# Patient Record
Sex: MALE | Race: WHITE | NOT HISPANIC OR LATINO | Employment: OTHER | URBAN - METROPOLITAN AREA
[De-identification: names, ages, dates, MRNs, and addresses within clinical notes are randomized per-mention and may not be internally consistent; named-entity substitution may affect disease eponyms.]

---

## 2018-08-10 ENCOUNTER — HOSPITAL ENCOUNTER (INPATIENT)
Facility: HOSPITAL | Age: 73
LOS: 3 days | Discharge: HOME | End: 2018-08-13
Attending: EMERGENCY MEDICINE | Admitting: HOSPITALIST

## 2018-08-10 DIAGNOSIS — K92.2 GI BLEED: ICD-10-CM

## 2018-08-10 DIAGNOSIS — K92.1 HEMATOCHEZIA: ICD-10-CM

## 2018-08-10 DIAGNOSIS — K92.2 ACUTE GASTROINTESTINAL HEMORRHAGE: Primary | ICD-10-CM

## 2018-08-10 LAB
ABO GROUP BLD: NORMAL
ABO GROUP BLD: NORMAL
ALBUMIN SERPL-MCNC: 4.2 G/DL (ref 3.5–5.2)
ALBUMIN/GLOB SERPL: 2.1 G/DL
ALP SERPL-CCNC: 45 U/L (ref 40–129)
ALT SERPL W P-5'-P-CCNC: 28 U/L (ref 5–41)
ANION GAP SERPL CALCULATED.3IONS-SCNC: 11.2 MMOL/L
APTT PPP: 30.7 SECONDS (ref 24.3–38.1)
AST SERPL-CCNC: 18 U/L (ref 5–40)
BASOPHILS # BLD AUTO: 0.05 10*3/MM3 (ref 0–0.2)
BASOPHILS NFR BLD AUTO: 0.5 % (ref 0–2)
BILIRUB SERPL-MCNC: 0.2 MG/DL (ref 0.2–1.2)
BLD GP AB SCN SERPL QL: NEGATIVE
BUN BLD-MCNC: 24 MG/DL (ref 8–23)
BUN/CREAT SERPL: 20.5 (ref 7–25)
CALCIUM SPEC-SCNC: 9.3 MG/DL (ref 8.8–10.5)
CHLORIDE SERPL-SCNC: 104 MMOL/L (ref 98–107)
CO2 SERPL-SCNC: 23.8 MMOL/L (ref 22–29)
CREAT BLD-MCNC: 1.17 MG/DL (ref 0.76–1.27)
DEPRECATED RDW RBC AUTO: 45 FL (ref 37–54)
EOSINOPHIL # BLD AUTO: 0.13 10*3/MM3 (ref 0.1–0.3)
EOSINOPHIL NFR BLD AUTO: 1.3 % (ref 0–4)
ERYTHROCYTE [DISTWIDTH] IN BLOOD BY AUTOMATED COUNT: 13.2 % (ref 11.5–14.5)
GFR SERPL CREATININE-BSD FRML MDRD: 61 ML/MIN/1.73
GLOBULIN UR ELPH-MCNC: 2 GM/DL
GLUCOSE BLD-MCNC: 139 MG/DL (ref 65–99)
HCT VFR BLD AUTO: 35.8 % (ref 42–52)
HCT VFR BLD AUTO: 40 % (ref 42–52)
HGB BLD-MCNC: 11.7 G/DL (ref 14–18)
HGB BLD-MCNC: 13.1 G/DL (ref 14–18)
IMM GRANULOCYTES # BLD: 0.03 10*3/MM3 (ref 0–0.03)
IMM GRANULOCYTES NFR BLD: 0.3 % (ref 0–0.5)
INR PPP: 1.05 (ref 0.9–1.1)
LIPASE SERPL-CCNC: 22 U/L (ref 13–60)
LYMPHOCYTES # BLD AUTO: 3.74 10*3/MM3 (ref 0.6–4.8)
LYMPHOCYTES NFR BLD AUTO: 38.7 % (ref 20–45)
MCH RBC QN AUTO: 30.6 PG (ref 27–31)
MCHC RBC AUTO-ENTMCNC: 32.8 G/DL (ref 31–37)
MCV RBC AUTO: 93.5 FL (ref 80–94)
MONOCYTES # BLD AUTO: 0.8 10*3/MM3 (ref 0–1)
MONOCYTES NFR BLD AUTO: 8.3 % (ref 3–8)
NEUTROPHILS # BLD AUTO: 4.92 10*3/MM3 (ref 1.5–8.3)
NEUTROPHILS NFR BLD AUTO: 50.9 % (ref 45–70)
NRBC BLD MANUAL-RTO: 0 /100 WBC (ref 0–0)
PLATELET # BLD AUTO: 233 10*3/MM3 (ref 140–500)
PMV BLD AUTO: 10.9 FL (ref 7.4–10.4)
POTASSIUM BLD-SCNC: 4.7 MMOL/L (ref 3.5–5.2)
PROT SERPL-MCNC: 6.2 G/DL (ref 6–8.5)
PROTHROMBIN TIME: 13.7 SECONDS (ref 12.1–15)
RBC # BLD AUTO: 4.28 10*6/MM3 (ref 4.7–6.1)
RH BLD: POSITIVE
RH BLD: POSITIVE
SODIUM BLD-SCNC: 139 MMOL/L (ref 136–145)
T&S EXPIRATION DATE: NORMAL
WBC NRBC COR # BLD: 9.67 10*3/MM3 (ref 4.8–10.8)

## 2018-08-10 PROCEDURE — 85730 THROMBOPLASTIN TIME PARTIAL: CPT | Performed by: EMERGENCY MEDICINE

## 2018-08-10 PROCEDURE — 99284 EMERGENCY DEPT VISIT MOD MDM: CPT

## 2018-08-10 PROCEDURE — 86901 BLOOD TYPING SEROLOGIC RH(D): CPT | Performed by: EMERGENCY MEDICINE

## 2018-08-10 PROCEDURE — 85610 PROTHROMBIN TIME: CPT | Performed by: EMERGENCY MEDICINE

## 2018-08-10 PROCEDURE — 85014 HEMATOCRIT: CPT | Performed by: INTERNAL MEDICINE

## 2018-08-10 PROCEDURE — 80053 COMPREHEN METABOLIC PANEL: CPT | Performed by: EMERGENCY MEDICINE

## 2018-08-10 PROCEDURE — 83690 ASSAY OF LIPASE: CPT | Performed by: EMERGENCY MEDICINE

## 2018-08-10 PROCEDURE — 86923 COMPATIBILITY TEST ELECTRIC: CPT

## 2018-08-10 PROCEDURE — 85018 HEMOGLOBIN: CPT | Performed by: INTERNAL MEDICINE

## 2018-08-10 PROCEDURE — 86901 BLOOD TYPING SEROLOGIC RH(D): CPT

## 2018-08-10 PROCEDURE — 99223 1ST HOSP IP/OBS HIGH 75: CPT | Performed by: INTERNAL MEDICINE

## 2018-08-10 PROCEDURE — 86850 RBC ANTIBODY SCREEN: CPT | Performed by: EMERGENCY MEDICINE

## 2018-08-10 PROCEDURE — 85025 COMPLETE CBC W/AUTO DIFF WBC: CPT | Performed by: EMERGENCY MEDICINE

## 2018-08-10 PROCEDURE — 99284 EMERGENCY DEPT VISIT MOD MDM: CPT | Performed by: EMERGENCY MEDICINE

## 2018-08-10 PROCEDURE — 86900 BLOOD TYPING SEROLOGIC ABO: CPT

## 2018-08-10 PROCEDURE — 86900 BLOOD TYPING SEROLOGIC ABO: CPT | Performed by: EMERGENCY MEDICINE

## 2018-08-10 RX ORDER — FINASTERIDE 5 MG/1
5 TABLET, FILM COATED ORAL DAILY
COMMUNITY
End: 2020-12-29 | Stop reason: SDUPTHER

## 2018-08-10 RX ORDER — ATORVASTATIN CALCIUM 40 MG/1
40 TABLET, FILM COATED ORAL DAILY
Status: DISCONTINUED | OUTPATIENT
Start: 2018-08-11 | End: 2018-08-13 | Stop reason: HOSPADM

## 2018-08-10 RX ORDER — TRAMADOL HYDROCHLORIDE 50 MG/1
50 TABLET ORAL EVERY 6 HOURS PRN
Status: DISCONTINUED | OUTPATIENT
Start: 2018-08-10 | End: 2018-08-13 | Stop reason: HOSPADM

## 2018-08-10 RX ORDER — ASPIRIN 81 MG/1
81 TABLET, CHEWABLE ORAL DAILY
COMMUNITY
End: 2018-08-13 | Stop reason: HOSPADM

## 2018-08-10 RX ORDER — SODIUM CHLORIDE 9 MG/ML
75 INJECTION, SOLUTION INTRAVENOUS CONTINUOUS
Status: DISCONTINUED | OUTPATIENT
Start: 2018-08-10 | End: 2018-08-11

## 2018-08-10 RX ORDER — CARVEDILOL 12.5 MG/1
12.5 TABLET ORAL 2 TIMES DAILY WITH MEALS
COMMUNITY
End: 2020-12-29 | Stop reason: SDUPTHER

## 2018-08-10 RX ORDER — SODIUM CHLORIDE 0.9 % (FLUSH) 0.9 %
1-10 SYRINGE (ML) INJECTION AS NEEDED
Status: DISCONTINUED | OUTPATIENT
Start: 2018-08-10 | End: 2018-08-13 | Stop reason: HOSPADM

## 2018-08-10 RX ORDER — ALLOPURINOL 300 MG/1
300 TABLET ORAL DAILY
Status: DISCONTINUED | OUTPATIENT
Start: 2018-08-11 | End: 2018-08-13 | Stop reason: HOSPADM

## 2018-08-10 RX ORDER — LISINOPRIL 10 MG/1
10 TABLET ORAL DAILY
Status: DISCONTINUED | OUTPATIENT
Start: 2018-08-11 | End: 2018-08-13 | Stop reason: HOSPADM

## 2018-08-10 RX ORDER — CALCIUM CARBONATE 200(500)MG
1 TABLET,CHEWABLE ORAL 2 TIMES DAILY PRN
Status: DISCONTINUED | OUTPATIENT
Start: 2018-08-10 | End: 2018-08-10

## 2018-08-10 RX ORDER — CELECOXIB 200 MG/1
200 CAPSULE ORAL DAILY PRN
COMMUNITY
End: 2020-12-29 | Stop reason: SDUPTHER

## 2018-08-10 RX ORDER — FINASTERIDE 5 MG/1
5 TABLET, FILM COATED ORAL DAILY
Status: DISCONTINUED | OUTPATIENT
Start: 2018-08-11 | End: 2018-08-13 | Stop reason: HOSPADM

## 2018-08-10 RX ORDER — CHOLECALCIFEROL (VITAMIN D3) 125 MCG
5 CAPSULE ORAL NIGHTLY PRN
Status: DISCONTINUED | OUTPATIENT
Start: 2018-08-10 | End: 2018-08-13 | Stop reason: HOSPADM

## 2018-08-10 RX ORDER — PANTOPRAZOLE SODIUM 40 MG/10ML
80 INJECTION, POWDER, LYOPHILIZED, FOR SOLUTION INTRAVENOUS EVERY 12 HOURS SCHEDULED
Status: DISCONTINUED | OUTPATIENT
Start: 2018-08-10 | End: 2018-08-10

## 2018-08-10 RX ORDER — ONDANSETRON 4 MG/1
4 TABLET, FILM COATED ORAL EVERY 6 HOURS PRN
Status: DISCONTINUED | OUTPATIENT
Start: 2018-08-10 | End: 2018-08-13 | Stop reason: HOSPADM

## 2018-08-10 RX ORDER — ATORVASTATIN CALCIUM 40 MG/1
40 TABLET, FILM COATED ORAL DAILY
COMMUNITY
End: 2020-12-29 | Stop reason: SDUPTHER

## 2018-08-10 RX ORDER — ALLOPURINOL 300 MG/1
300 TABLET ORAL DAILY
COMMUNITY
End: 2020-12-29 | Stop reason: SDUPTHER

## 2018-08-10 RX ORDER — LISINOPRIL 20 MG/1
10 TABLET ORAL DAILY
COMMUNITY
End: 2020-12-29 | Stop reason: SDUPTHER

## 2018-08-10 RX ORDER — SODIUM CHLORIDE 0.9 % (FLUSH) 0.9 %
10 SYRINGE (ML) INJECTION AS NEEDED
Status: DISCONTINUED | OUTPATIENT
Start: 2018-08-10 | End: 2018-08-10

## 2018-08-10 RX ORDER — ACETAMINOPHEN 325 MG/1
650 TABLET ORAL EVERY 4 HOURS PRN
Status: DISCONTINUED | OUTPATIENT
Start: 2018-08-10 | End: 2018-08-13 | Stop reason: HOSPADM

## 2018-08-10 RX ORDER — PANTOPRAZOLE SODIUM 40 MG/10ML
40 INJECTION, POWDER, LYOPHILIZED, FOR SOLUTION INTRAVENOUS
Status: DISCONTINUED | OUTPATIENT
Start: 2018-08-11 | End: 2018-08-10

## 2018-08-10 RX ORDER — CARVEDILOL 12.5 MG/1
12.5 TABLET ORAL 2 TIMES DAILY WITH MEALS
Status: DISCONTINUED | OUTPATIENT
Start: 2018-08-10 | End: 2018-08-13 | Stop reason: HOSPADM

## 2018-08-10 RX ORDER — SODIUM CHLORIDE 9 MG/ML
40 INJECTION, SOLUTION INTRAVENOUS AS NEEDED
Status: DISCONTINUED | OUTPATIENT
Start: 2018-08-10 | End: 2018-08-13 | Stop reason: HOSPADM

## 2018-08-10 RX ORDER — PANTOPRAZOLE SODIUM 40 MG/10ML
80 INJECTION, POWDER, LYOPHILIZED, FOR SOLUTION INTRAVENOUS ONCE
Status: COMPLETED | OUTPATIENT
Start: 2018-08-10 | End: 2018-08-10

## 2018-08-10 RX ADMIN — Medication 10 ML: at 21:16

## 2018-08-10 RX ADMIN — SODIUM CHLORIDE 75 ML/HR: 9 INJECTION, SOLUTION INTRAVENOUS at 21:16

## 2018-08-10 RX ADMIN — FAMOTIDINE 40 MG: 10 INJECTION, SOLUTION INTRAVENOUS at 15:57

## 2018-08-10 RX ADMIN — SODIUM CHLORIDE 1000 ML: 9 INJECTION, SOLUTION INTRAVENOUS at 15:57

## 2018-08-10 RX ADMIN — PANTOPRAZOLE SODIUM 8 MG/HR: 40 INJECTION, POWDER, FOR SOLUTION INTRAVENOUS at 21:17

## 2018-08-10 RX ADMIN — PANTOPRAZOLE SODIUM 80 MG: 40 INJECTION, POWDER, FOR SOLUTION INTRAVENOUS at 21:15

## 2018-08-11 ENCOUNTER — ANESTHESIA EVENT (OUTPATIENT)
Dept: PERIOP | Facility: HOSPITAL | Age: 73
End: 2018-08-11

## 2018-08-11 ENCOUNTER — ANESTHESIA (OUTPATIENT)
Dept: PERIOP | Facility: HOSPITAL | Age: 73
End: 2018-08-11

## 2018-08-11 LAB
ANION GAP SERPL CALCULATED.3IONS-SCNC: 10.1 MMOL/L
BASOPHILS # BLD AUTO: 0.03 10*3/MM3 (ref 0–0.2)
BASOPHILS NFR BLD AUTO: 0.3 % (ref 0–2)
BUN BLD-MCNC: 18 MG/DL (ref 8–23)
BUN/CREAT SERPL: 19.1 (ref 7–25)
CALCIUM SPEC-SCNC: 8.4 MG/DL (ref 8.8–10.5)
CHLORIDE SERPL-SCNC: 105 MMOL/L (ref 98–107)
CO2 SERPL-SCNC: 24.9 MMOL/L (ref 22–29)
CREAT BLD-MCNC: 0.94 MG/DL (ref 0.76–1.27)
DEPRECATED RDW RBC AUTO: 45.8 FL (ref 37–54)
EOSINOPHIL # BLD AUTO: 0.03 10*3/MM3 (ref 0.1–0.3)
EOSINOPHIL NFR BLD AUTO: 0.3 % (ref 0–4)
ERYTHROCYTE [DISTWIDTH] IN BLOOD BY AUTOMATED COUNT: 13.3 % (ref 11.5–14.5)
GFR SERPL CREATININE-BSD FRML MDRD: 79 ML/MIN/1.73
GLUCOSE BLD-MCNC: 172 MG/DL (ref 65–99)
GLUCOSE BLDC GLUCOMTR-MCNC: 187 MG/DL (ref 70–130)
HCT VFR BLD AUTO: 28.8 % (ref 42–52)
HCT VFR BLD AUTO: 32.2 % (ref 42–52)
HGB BLD-MCNC: 10.6 G/DL (ref 14–18)
HGB BLD-MCNC: 9.5 G/DL (ref 14–18)
IMM GRANULOCYTES # BLD: 0.03 10*3/MM3 (ref 0–0.03)
IMM GRANULOCYTES NFR BLD: 0.3 % (ref 0–0.5)
LYMPHOCYTES # BLD AUTO: 3.13 10*3/MM3 (ref 0.6–4.8)
LYMPHOCYTES NFR BLD AUTO: 28.6 % (ref 20–45)
MCH RBC QN AUTO: 31.1 PG (ref 27–31)
MCHC RBC AUTO-ENTMCNC: 32.9 G/DL (ref 31–37)
MCV RBC AUTO: 94.4 FL (ref 80–94)
MONOCYTES # BLD AUTO: 0.55 10*3/MM3 (ref 0–1)
MONOCYTES NFR BLD AUTO: 5 % (ref 3–8)
NEUTROPHILS # BLD AUTO: 7.17 10*3/MM3 (ref 1.5–8.3)
NEUTROPHILS NFR BLD AUTO: 65.5 % (ref 45–70)
NRBC BLD MANUAL-RTO: 0 /100 WBC (ref 0–0)
PLATELET # BLD AUTO: 216 10*3/MM3 (ref 140–500)
PMV BLD AUTO: 10.5 FL (ref 7.4–10.4)
POTASSIUM BLD-SCNC: 4.3 MMOL/L (ref 3.5–5.2)
RBC # BLD AUTO: 3.41 10*6/MM3 (ref 4.7–6.1)
SODIUM BLD-SCNC: 140 MMOL/L (ref 136–145)
WBC NRBC COR # BLD: 10.94 10*3/MM3 (ref 4.8–10.8)

## 2018-08-11 PROCEDURE — 25010000002 PROPOFOL 10 MG/ML EMULSION: Performed by: NURSE ANESTHETIST, CERTIFIED REGISTERED

## 2018-08-11 PROCEDURE — 93005 ELECTROCARDIOGRAM TRACING: CPT | Performed by: NURSE ANESTHETIST, CERTIFIED REGISTERED

## 2018-08-11 PROCEDURE — 82962 GLUCOSE BLOOD TEST: CPT

## 2018-08-11 PROCEDURE — 99232 SBSQ HOSP IP/OBS MODERATE 35: CPT | Performed by: FAMILY MEDICINE

## 2018-08-11 PROCEDURE — 0DB38ZX EXCISION OF LOWER ESOPHAGUS, VIA NATURAL OR ARTIFICIAL OPENING ENDOSCOPIC, DIAGNOSTIC: ICD-10-PCS | Performed by: INTERNAL MEDICINE

## 2018-08-11 PROCEDURE — 43239 EGD BIOPSY SINGLE/MULTIPLE: CPT | Performed by: INTERNAL MEDICINE

## 2018-08-11 PROCEDURE — 85025 COMPLETE CBC W/AUTO DIFF WBC: CPT | Performed by: INTERNAL MEDICINE

## 2018-08-11 PROCEDURE — 85014 HEMATOCRIT: CPT | Performed by: FAMILY MEDICINE

## 2018-08-11 PROCEDURE — 80048 BASIC METABOLIC PNL TOTAL CA: CPT | Performed by: INTERNAL MEDICINE

## 2018-08-11 PROCEDURE — 93010 ELECTROCARDIOGRAM REPORT: CPT | Performed by: INTERNAL MEDICINE

## 2018-08-11 PROCEDURE — 85018 HEMOGLOBIN: CPT | Performed by: FAMILY MEDICINE

## 2018-08-11 PROCEDURE — 0DB68ZX EXCISION OF STOMACH, VIA NATURAL OR ARTIFICIAL OPENING ENDOSCOPIC, DIAGNOSTIC: ICD-10-PCS | Performed by: INTERNAL MEDICINE

## 2018-08-11 RX ORDER — SODIUM CHLORIDE 0.9 % (FLUSH) 0.9 %
1-10 SYRINGE (ML) INJECTION AS NEEDED
Status: DISCONTINUED | OUTPATIENT
Start: 2018-08-11 | End: 2018-08-11 | Stop reason: HOSPADM

## 2018-08-11 RX ORDER — MAGNESIUM HYDROXIDE 1200 MG/15ML
LIQUID ORAL AS NEEDED
Status: DISCONTINUED | OUTPATIENT
Start: 2018-08-11 | End: 2018-08-11 | Stop reason: HOSPADM

## 2018-08-11 RX ORDER — PROPOFOL 10 MG/ML
VIAL (ML) INTRAVENOUS AS NEEDED
Status: DISCONTINUED | OUTPATIENT
Start: 2018-08-11 | End: 2018-08-11 | Stop reason: SURG

## 2018-08-11 RX ORDER — MEPERIDINE HYDROCHLORIDE 25 MG/ML
12.5 INJECTION INTRAMUSCULAR; INTRAVENOUS; SUBCUTANEOUS
Status: DISCONTINUED | OUTPATIENT
Start: 2018-08-11 | End: 2018-08-11 | Stop reason: HOSPADM

## 2018-08-11 RX ORDER — SODIUM CHLORIDE, SODIUM LACTATE, POTASSIUM CHLORIDE, CALCIUM CHLORIDE 600; 310; 30; 20 MG/100ML; MG/100ML; MG/100ML; MG/100ML
100 INJECTION, SOLUTION INTRAVENOUS CONTINUOUS
Status: DISCONTINUED | OUTPATIENT
Start: 2018-08-11 | End: 2018-08-11

## 2018-08-11 RX ORDER — LIDOCAINE HYDROCHLORIDE 20 MG/ML
INJECTION, SOLUTION INFILTRATION; PERINEURAL AS NEEDED
Status: DISCONTINUED | OUTPATIENT
Start: 2018-08-11 | End: 2018-08-11 | Stop reason: SURG

## 2018-08-11 RX ORDER — ONDANSETRON 2 MG/ML
4 INJECTION INTRAMUSCULAR; INTRAVENOUS ONCE AS NEEDED
Status: DISCONTINUED | OUTPATIENT
Start: 2018-08-11 | End: 2018-08-11 | Stop reason: HOSPADM

## 2018-08-11 RX ORDER — SODIUM CHLORIDE 9 MG/ML
40 INJECTION, SOLUTION INTRAVENOUS AS NEEDED
Status: DISCONTINUED | OUTPATIENT
Start: 2018-08-11 | End: 2018-08-11 | Stop reason: HOSPADM

## 2018-08-11 RX ORDER — CELECOXIB 100 MG/1
200 CAPSULE ORAL DAILY PRN
Status: DISCONTINUED | OUTPATIENT
Start: 2018-08-11 | End: 2018-08-13 | Stop reason: HOSPADM

## 2018-08-11 RX ORDER — PANTOPRAZOLE SODIUM 40 MG/10ML
INJECTION, POWDER, LYOPHILIZED, FOR SOLUTION INTRAVENOUS
Status: DISPENSED
Start: 2018-08-11 | End: 2018-08-11

## 2018-08-11 RX ADMIN — CARVEDILOL 12.5 MG: 12.5 TABLET, FILM COATED ORAL at 11:36

## 2018-08-11 RX ADMIN — ALLOPURINOL 300 MG: 300 TABLET ORAL at 11:36

## 2018-08-11 RX ADMIN — PANTOPRAZOLE SODIUM 8 MG/HR: 40 INJECTION, POWDER, FOR SOLUTION INTRAVENOUS at 06:32

## 2018-08-11 RX ADMIN — METFORMIN HYDROCHLORIDE 850 MG: 850 TABLET, FILM COATED ORAL at 18:14

## 2018-08-11 RX ADMIN — FINASTERIDE 5 MG: 5 TABLET, FILM COATED ORAL at 11:38

## 2018-08-11 RX ADMIN — PROPOFOL 50 MG: 10 INJECTION, EMULSION INTRAVENOUS at 10:03

## 2018-08-11 RX ADMIN — Medication 10 ML: at 20:11

## 2018-08-11 RX ADMIN — CARVEDILOL 12.5 MG: 12.5 TABLET, FILM COATED ORAL at 18:13

## 2018-08-11 RX ADMIN — ATORVASTATIN CALCIUM 40 MG: 40 TABLET, FILM COATED ORAL at 11:38

## 2018-08-11 RX ADMIN — PROPOFOL 100 MG: 10 INJECTION, EMULSION INTRAVENOUS at 10:01

## 2018-08-11 RX ADMIN — LIDOCAINE HYDROCHLORIDE 100 MG: 20 INJECTION, SOLUTION INFILTRATION; PERINEURAL at 10:00

## 2018-08-11 RX ADMIN — LISINOPRIL 10 MG: 10 TABLET ORAL at 11:36

## 2018-08-11 RX ADMIN — PROPOFOL 50 MG: 10 INJECTION, EMULSION INTRAVENOUS at 10:06

## 2018-08-11 RX ADMIN — SODIUM CHLORIDE, POTASSIUM CHLORIDE, SODIUM LACTATE AND CALCIUM CHLORIDE: 600; 310; 30; 20 INJECTION, SOLUTION INTRAVENOUS at 09:04

## 2018-08-11 RX ADMIN — PANTOPRAZOLE SODIUM 8 MG/HR: 40 INJECTION, POWDER, FOR SOLUTION INTRAVENOUS at 01:42

## 2018-08-11 NOTE — ANESTHESIA POSTPROCEDURE EVALUATION
Patient: Kwabena Le Jr.    Procedure Summary     Date:  08/11/18 Room / Location:   [facility] /  [facility]    Anesthesia Start:  0952 Anesthesia Stop:  1016    Procedure:  ESOPHAGOGASTRODUODENOSCOPY w/ biopsies (N/A Esophagus) Diagnosis:      Surgeon:  Mili Iraheta MD Provider:  Cristino Candelaria CRNA    Anesthesia Type:  MAC ASA Status:  2          Anesthesia Type: MAC  Last vitals  BP   136/74 (08/11/18 0919)   Temp   98.1 °F (36.7 °C) (08/11/18 0400)   Pulse   72 (08/11/18 0919)   Resp   18 (08/11/18 0919)     SpO2   97 % (08/11/18 0919)     Post Anesthesia Care and Evaluation    Patient location during evaluation: PHASE II  Patient participation: complete - patient participated  Level of consciousness: awake and alert  Pain score: 0  Pain management: adequate  Airway patency: patent  Anesthetic complications: No anesthetic complications  PONV Status: none  Cardiovascular status: acceptable  Respiratory status: acceptable  Hydration status: acceptable

## 2018-08-11 NOTE — ANESTHESIA PREPROCEDURE EVALUATION
Anesthesia Evaluation     Patient summary reviewed and Nursing notes reviewed   no history of anesthetic complications:  NPO Solid Status: > 8 hours  NPO Liquid Status: > 8 hours           Airway   Mallampati: II  TM distance: >3 FB  Neck ROM: full  Dental - normal exam     Pulmonary - negative pulmonary ROS and normal exam    breath sounds clear to auscultation  Cardiovascular - normal exam  Exercise tolerance: good (4-7 METS)    ECG reviewed  Rhythm: regular  Rate: normal    (+) hypertension well controlled 2 medications or greater, past MI (2007) , cardiac stents (2007)       Neuro/Psych- negative ROS  GI/Hepatic/Renal/Endo    (+)  GI bleeding, diabetes mellitus type 2,     Musculoskeletal     (+) neck pain (R shoulder pain),   Abdominal  - normal exam   Substance History - negative use     OB/GYN          Other   (+) arthritis                   Anesthesia Plan    ASA 2     MAC     Anesthetic plan and risks discussed with patient.  Use of blood products discussed with patient  Consented to blood products.

## 2018-08-12 LAB
ANION GAP SERPL CALCULATED.3IONS-SCNC: 8.3 MMOL/L
BUN BLD-MCNC: 13 MG/DL (ref 8–23)
BUN/CREAT SERPL: 14.8 (ref 7–25)
CALCIUM SPEC-SCNC: 8.7 MG/DL (ref 8.8–10.5)
CHLORIDE SERPL-SCNC: 101 MMOL/L (ref 98–107)
CO2 SERPL-SCNC: 27.7 MMOL/L (ref 22–29)
CREAT BLD-MCNC: 0.88 MG/DL (ref 0.76–1.27)
DEPRECATED RDW RBC AUTO: 45.5 FL (ref 37–54)
ERYTHROCYTE [DISTWIDTH] IN BLOOD BY AUTOMATED COUNT: 13.3 % (ref 11.5–14.5)
GFR SERPL CREATININE-BSD FRML MDRD: 85 ML/MIN/1.73
GLUCOSE BLD-MCNC: 160 MG/DL (ref 65–99)
HCT VFR BLD AUTO: 26.1 % (ref 42–52)
HCT VFR BLD AUTO: 29.5 % (ref 42–52)
HGB BLD-MCNC: 8.6 G/DL (ref 14–18)
HGB BLD-MCNC: 9.7 G/DL (ref 14–18)
MCH RBC QN AUTO: 30.7 PG (ref 27–31)
MCHC RBC AUTO-ENTMCNC: 33 G/DL (ref 31–37)
MCV RBC AUTO: 93.2 FL (ref 80–94)
PLATELET # BLD AUTO: 174 10*3/MM3 (ref 140–500)
PMV BLD AUTO: 10.9 FL (ref 7.4–10.4)
POTASSIUM BLD-SCNC: 4 MMOL/L (ref 3.5–5.2)
RBC # BLD AUTO: 2.8 10*6/MM3 (ref 4.7–6.1)
SODIUM BLD-SCNC: 137 MMOL/L (ref 136–145)
WBC NRBC COR # BLD: 8.21 10*3/MM3 (ref 4.8–10.8)

## 2018-08-12 PROCEDURE — 85014 HEMATOCRIT: CPT | Performed by: FAMILY MEDICINE

## 2018-08-12 PROCEDURE — 99232 SBSQ HOSP IP/OBS MODERATE 35: CPT | Performed by: FAMILY MEDICINE

## 2018-08-12 PROCEDURE — 85018 HEMOGLOBIN: CPT | Performed by: FAMILY MEDICINE

## 2018-08-12 PROCEDURE — 99232 SBSQ HOSP IP/OBS MODERATE 35: CPT | Performed by: INTERNAL MEDICINE

## 2018-08-12 PROCEDURE — 85027 COMPLETE CBC AUTOMATED: CPT | Performed by: FAMILY MEDICINE

## 2018-08-12 PROCEDURE — 80048 BASIC METABOLIC PNL TOTAL CA: CPT | Performed by: FAMILY MEDICINE

## 2018-08-12 RX ADMIN — ATORVASTATIN CALCIUM 40 MG: 40 TABLET, FILM COATED ORAL at 07:54

## 2018-08-12 RX ADMIN — METFORMIN HYDROCHLORIDE 850 MG: 850 TABLET, FILM COATED ORAL at 16:55

## 2018-08-12 RX ADMIN — METFORMIN HYDROCHLORIDE 850 MG: 850 TABLET, FILM COATED ORAL at 07:54

## 2018-08-12 RX ADMIN — CELECOXIB 200 MG: 100 CAPSULE ORAL at 22:10

## 2018-08-12 RX ADMIN — CARVEDILOL 12.5 MG: 12.5 TABLET, FILM COATED ORAL at 16:55

## 2018-08-12 RX ADMIN — ALLOPURINOL 300 MG: 300 TABLET ORAL at 07:54

## 2018-08-12 RX ADMIN — POLYETHYLENE GLYCOL 3350, SODIUM SULFATE ANHYDROUS, SODIUM BICARBONATE, SODIUM CHLORIDE, POTASSIUM CHLORIDE 4000 ML: 236; 22.74; 6.74; 5.86; 2.97 POWDER, FOR SOLUTION ORAL at 14:50

## 2018-08-12 RX ADMIN — LISINOPRIL 10 MG: 10 TABLET ORAL at 07:55

## 2018-08-12 RX ADMIN — CELECOXIB 200 MG: 100 CAPSULE ORAL at 04:33

## 2018-08-12 RX ADMIN — FINASTERIDE 5 MG: 5 TABLET, FILM COATED ORAL at 07:54

## 2018-08-12 RX ADMIN — CARVEDILOL 12.5 MG: 12.5 TABLET, FILM COATED ORAL at 07:55

## 2018-08-13 ENCOUNTER — ANESTHESIA (OUTPATIENT)
Dept: PERIOP | Facility: HOSPITAL | Age: 73
End: 2018-08-13

## 2018-08-13 ENCOUNTER — ANESTHESIA EVENT (OUTPATIENT)
Dept: PERIOP | Facility: HOSPITAL | Age: 73
End: 2018-08-13

## 2018-08-13 VITALS
SYSTOLIC BLOOD PRESSURE: 134 MMHG | OXYGEN SATURATION: 97 % | WEIGHT: 206 LBS | DIASTOLIC BLOOD PRESSURE: 65 MMHG | HEIGHT: 71 IN | HEART RATE: 61 BPM | RESPIRATION RATE: 18 BRPM | BODY MASS INDEX: 28.84 KG/M2 | TEMPERATURE: 98.2 F

## 2018-08-13 LAB
ANION GAP SERPL CALCULATED.3IONS-SCNC: 8.4 MMOL/L
BUN BLD-MCNC: 10 MG/DL (ref 8–23)
BUN/CREAT SERPL: 10.3 (ref 7–25)
CALCIUM SPEC-SCNC: 8.4 MG/DL (ref 8.8–10.5)
CHLORIDE SERPL-SCNC: 103 MMOL/L (ref 98–107)
CO2 SERPL-SCNC: 28.6 MMOL/L (ref 22–29)
CREAT BLD-MCNC: 0.97 MG/DL (ref 0.76–1.27)
DEPRECATED RDW RBC AUTO: 46.3 FL (ref 37–54)
ERYTHROCYTE [DISTWIDTH] IN BLOOD BY AUTOMATED COUNT: 13.6 % (ref 11.5–14.5)
GFR SERPL CREATININE-BSD FRML MDRD: 76 ML/MIN/1.73
GLUCOSE BLD-MCNC: 130 MG/DL (ref 65–99)
HCT VFR BLD AUTO: 24.3 % (ref 42–52)
HCT VFR BLD AUTO: 29.5 % (ref 42–52)
HGB BLD-MCNC: 7.9 G/DL (ref 14–18)
HGB BLD-MCNC: 9.8 G/DL (ref 14–18)
MCH RBC QN AUTO: 30.9 PG (ref 27–31)
MCHC RBC AUTO-ENTMCNC: 32.5 G/DL (ref 31–37)
MCV RBC AUTO: 94.9 FL (ref 80–94)
PLATELET # BLD AUTO: 171 10*3/MM3 (ref 140–500)
PMV BLD AUTO: 10.9 FL (ref 7.4–10.4)
POTASSIUM BLD-SCNC: 3.8 MMOL/L (ref 3.5–5.2)
RBC # BLD AUTO: 2.56 10*6/MM3 (ref 4.7–6.1)
SODIUM BLD-SCNC: 140 MMOL/L (ref 136–145)
WBC NRBC COR # BLD: 8.17 10*3/MM3 (ref 4.8–10.8)

## 2018-08-13 PROCEDURE — 45380 COLONOSCOPY AND BIOPSY: CPT | Performed by: INTERNAL MEDICINE

## 2018-08-13 PROCEDURE — P9016 RBC LEUKOCYTES REDUCED: HCPCS

## 2018-08-13 PROCEDURE — 85014 HEMATOCRIT: CPT | Performed by: HOSPITALIST

## 2018-08-13 PROCEDURE — 25010000002 PROPOFOL 10 MG/ML EMULSION: Performed by: NURSE ANESTHETIST, CERTIFIED REGISTERED

## 2018-08-13 PROCEDURE — 86900 BLOOD TYPING SEROLOGIC ABO: CPT

## 2018-08-13 PROCEDURE — 36430 TRANSFUSION BLD/BLD COMPNT: CPT

## 2018-08-13 PROCEDURE — 0DBL8ZX EXCISION OF TRANSVERSE COLON, VIA NATURAL OR ARTIFICIAL OPENING ENDOSCOPIC, DIAGNOSTIC: ICD-10-PCS | Performed by: INTERNAL MEDICINE

## 2018-08-13 PROCEDURE — 99239 HOSP IP/OBS DSCHRG MGMT >30: CPT | Performed by: HOSPITALIST

## 2018-08-13 PROCEDURE — 0DBN8ZX EXCISION OF SIGMOID COLON, VIA NATURAL OR ARTIFICIAL OPENING ENDOSCOPIC, DIAGNOSTIC: ICD-10-PCS | Performed by: INTERNAL MEDICINE

## 2018-08-13 PROCEDURE — 85018 HEMOGLOBIN: CPT | Performed by: HOSPITALIST

## 2018-08-13 PROCEDURE — 99239 HOSP IP/OBS DSCHRG MGMT >30: CPT | Performed by: NURSE PRACTITIONER

## 2018-08-13 PROCEDURE — 0DBK8ZX EXCISION OF ASCENDING COLON, VIA NATURAL OR ARTIFICIAL OPENING ENDOSCOPIC, DIAGNOSTIC: ICD-10-PCS | Performed by: INTERNAL MEDICINE

## 2018-08-13 PROCEDURE — 80048 BASIC METABOLIC PNL TOTAL CA: CPT | Performed by: INTERNAL MEDICINE

## 2018-08-13 PROCEDURE — 85027 COMPLETE CBC AUTOMATED: CPT | Performed by: INTERNAL MEDICINE

## 2018-08-13 RX ORDER — SODIUM CHLORIDE, SODIUM LACTATE, POTASSIUM CHLORIDE, CALCIUM CHLORIDE 600; 310; 30; 20 MG/100ML; MG/100ML; MG/100ML; MG/100ML
100 INJECTION, SOLUTION INTRAVENOUS CONTINUOUS
Status: DISCONTINUED | OUTPATIENT
Start: 2018-08-13 | End: 2018-08-13 | Stop reason: HOSPADM

## 2018-08-13 RX ORDER — SODIUM CHLORIDE, SODIUM LACTATE, POTASSIUM CHLORIDE, CALCIUM CHLORIDE 600; 310; 30; 20 MG/100ML; MG/100ML; MG/100ML; MG/100ML
INJECTION, SOLUTION INTRAVENOUS CONTINUOUS PRN
Status: DISCONTINUED | OUTPATIENT
Start: 2018-08-13 | End: 2018-08-13 | Stop reason: SURG

## 2018-08-13 RX ORDER — SODIUM CHLORIDE 9 MG/ML
150 INJECTION, SOLUTION INTRAVENOUS CONTINUOUS
Status: DISCONTINUED | OUTPATIENT
Start: 2018-08-13 | End: 2018-08-13

## 2018-08-13 RX ORDER — SODIUM CHLORIDE 9 MG/ML
INJECTION, SOLUTION INTRAVENOUS
Status: COMPLETED
Start: 2018-08-13 | End: 2018-08-13

## 2018-08-13 RX ORDER — LIDOCAINE HYDROCHLORIDE 20 MG/ML
INJECTION, SOLUTION INFILTRATION; PERINEURAL AS NEEDED
Status: DISCONTINUED | OUTPATIENT
Start: 2018-08-13 | End: 2018-08-13 | Stop reason: SURG

## 2018-08-13 RX ORDER — MEPERIDINE HYDROCHLORIDE 25 MG/ML
12.5 INJECTION INTRAMUSCULAR; INTRAVENOUS; SUBCUTANEOUS
Status: DISCONTINUED | OUTPATIENT
Start: 2018-08-13 | End: 2018-08-13 | Stop reason: HOSPADM

## 2018-08-13 RX ORDER — ONDANSETRON 2 MG/ML
4 INJECTION INTRAMUSCULAR; INTRAVENOUS ONCE AS NEEDED
Status: DISCONTINUED | OUTPATIENT
Start: 2018-08-13 | End: 2018-08-13 | Stop reason: HOSPADM

## 2018-08-13 RX ORDER — PROPOFOL 10 MG/ML
VIAL (ML) INTRAVENOUS AS NEEDED
Status: DISCONTINUED | OUTPATIENT
Start: 2018-08-13 | End: 2018-08-13 | Stop reason: SURG

## 2018-08-13 RX ADMIN — METFORMIN HYDROCHLORIDE 850 MG: 850 TABLET, FILM COATED ORAL at 10:32

## 2018-08-13 RX ADMIN — FINASTERIDE 5 MG: 5 TABLET, FILM COATED ORAL at 10:34

## 2018-08-13 RX ADMIN — PROPOFOL 40 MG: 10 INJECTION, EMULSION INTRAVENOUS at 09:07

## 2018-08-13 RX ADMIN — SODIUM CHLORIDE 250 ML: 9 INJECTION, SOLUTION INTRAVENOUS at 07:41

## 2018-08-13 RX ADMIN — SODIUM CHLORIDE, POTASSIUM CHLORIDE, SODIUM LACTATE AND CALCIUM CHLORIDE: 600; 310; 30; 20 INJECTION, SOLUTION INTRAVENOUS at 07:53

## 2018-08-13 RX ADMIN — LISINOPRIL 10 MG: 10 TABLET ORAL at 10:31

## 2018-08-13 RX ADMIN — PROPOFOL 40 MG: 10 INJECTION, EMULSION INTRAVENOUS at 09:14

## 2018-08-13 RX ADMIN — LIDOCAINE HYDROCHLORIDE 100 MG: 20 INJECTION, SOLUTION INFILTRATION; PERINEURAL at 08:41

## 2018-08-13 RX ADMIN — CARVEDILOL 12.5 MG: 12.5 TABLET, FILM COATED ORAL at 10:31

## 2018-08-13 RX ADMIN — ATORVASTATIN CALCIUM 40 MG: 40 TABLET, FILM COATED ORAL at 10:33

## 2018-08-13 RX ADMIN — PROPOFOL 40 MG: 10 INJECTION, EMULSION INTRAVENOUS at 08:52

## 2018-08-13 RX ADMIN — ALLOPURINOL 300 MG: 300 TABLET ORAL at 10:33

## 2018-08-13 RX ADMIN — SODIUM CHLORIDE, POTASSIUM CHLORIDE, SODIUM LACTATE AND CALCIUM CHLORIDE 100 ML/HR: 600; 310; 30; 20 INJECTION, SOLUTION INTRAVENOUS at 10:15

## 2018-08-13 RX ADMIN — PROPOFOL 40 MG: 10 INJECTION, EMULSION INTRAVENOUS at 08:44

## 2018-08-13 RX ADMIN — PROPOFOL 40 MG: 10 INJECTION, EMULSION INTRAVENOUS at 08:48

## 2018-08-13 RX ADMIN — PROPOFOL 40 MG: 10 INJECTION, EMULSION INTRAVENOUS at 09:00

## 2018-08-13 RX ADMIN — PROPOFOL 40 MG: 10 INJECTION, EMULSION INTRAVENOUS at 08:54

## 2018-08-13 RX ADMIN — PROPOFOL 80 MG: 10 INJECTION, EMULSION INTRAVENOUS at 08:41

## 2018-08-13 NOTE — ANESTHESIA POSTPROCEDURE EVALUATION
Patient: Kwabena Le Jr.    Procedure Summary     Date:  08/13/18 Room / Location:   [facility] /  [facility]    Anesthesia Start:  0837 Anesthesia Stop:  0920    Procedure:  COLONOSCOPY and polypectomy (N/A ) Diagnosis:      Surgeon:  Mili Iraheta MD Provider:  Cristino Candelaria CRNA    Anesthesia Type:  MAC ASA Status:  2          Anesthesia Type: MAC  Last vitals  BP   120/70 (08/13/18 0950)   Temp   97.8 °F (36.6 °C) (08/13/18 0927)   Pulse   52 (08/13/18 0950)   Resp   12 (08/13/18 0950)     SpO2   95 % (08/13/18 0950)     Post Anesthesia Care and Evaluation    Patient location during evaluation: bedside  Patient participation: complete - patient participated  Level of consciousness: awake and alert  Pain score: 0  Pain management: adequate  Airway patency: patent  Anesthetic complications: No anesthetic complications  PONV Status: none  Cardiovascular status: acceptable  Respiratory status: acceptable  Hydration status: acceptable

## 2018-08-13 NOTE — ANESTHESIA PREPROCEDURE EVALUATION
Anesthesia Evaluation     Patient summary reviewed and Nursing notes reviewed   no history of anesthetic complications:  NPO Solid Status: > 8 hours  NPO Liquid Status: > 8 hours           Airway   Mallampati: II  TM distance: >3 FB  Neck ROM: full  No difficulty expected  Dental - normal exam     Pulmonary - negative pulmonary ROS and normal exam    breath sounds clear to auscultation  Cardiovascular - normal exam  Exercise tolerance: good (4-7 METS)    ECG reviewed  Rhythm: regular  Rate: normal    (+) hypertension well controlled 2 medications or greater, past MI (2007) , cardiac stents (2007)       Neuro/Psych- negative ROS  GI/Hepatic/Renal/Endo    (+)  GI bleeding, diabetes mellitus type 2 well controlled,     Musculoskeletal     (+) neck pain (R shoulder pain),   Abdominal  - normal exam   Substance History - negative use     OB/GYN          Other   (+) blood dyscrasia (anemia, bleeding), arthritis                       Anesthesia Plan    ASA 2     MAC     Anesthetic plan and risks discussed with patient.  Use of blood products discussed with patient  Consented to blood products.

## 2018-08-14 ENCOUNTER — READMISSION MANAGEMENT (OUTPATIENT)
Dept: CALL CENTER | Facility: HOSPITAL | Age: 73
End: 2018-08-14

## 2018-08-14 NOTE — OUTREACH NOTE
Prep Survey      Responses   Facility patient discharged from?  [facility]   Is LACE score < 7 ?  Yes   Is patient eligible?  Yes   Discharge diagnosis  GI bleed likely diverticular   Does the patient have one of the following disease processes/diagnoses(primary or secondary)?  Other   Does the patient have Home health ordered?  No   Is there a DME ordered?  No   Prep survey completed?  Yes          Rachael Sinclair RN

## 2018-08-15 LAB
ABO + RH BLD: NORMAL
ABO + RH BLD: NORMAL
BH BB BLOOD EXPIRATION DATE: NORMAL
BH BB BLOOD EXPIRATION DATE: NORMAL
BH BB BLOOD TYPE BARCODE: 6200
BH BB BLOOD TYPE BARCODE: 6200
BH BB DISPENSE STATUS: NORMAL
BH BB DISPENSE STATUS: NORMAL
BH BB PRODUCT CODE: NORMAL
BH BB PRODUCT CODE: NORMAL
BH BB UNIT NUMBER: NORMAL
BH BB UNIT NUMBER: NORMAL
UNIT  ABO: NORMAL
UNIT  ABO: NORMAL
UNIT  RH: NORMAL
UNIT  RH: NORMAL

## 2018-08-16 ENCOUNTER — READMISSION MANAGEMENT (OUTPATIENT)
Dept: CALL CENTER | Facility: HOSPITAL | Age: 73
End: 2018-08-16

## 2018-08-16 LAB
LAB AP CASE REPORT: NORMAL
LAB AP CASE REPORT: NORMAL
PATH REPORT.FINAL DX SPEC: NORMAL
PATH REPORT.FINAL DX SPEC: NORMAL

## 2018-08-16 NOTE — OUTREACH NOTE
[facility] < 7 Survey      Responses   Facility patient discharged from?  [facility]   Does the patient have one of the following disease processes/diagnoses(primary or secondary)?  Other   Is there a successful TCM telephone encounter documented?  No   [facility] <7 Attempt successful?  No   Unsuccessful attempts  Attempt 1          Ryann Rodriguez RN

## 2018-08-17 ENCOUNTER — READMISSION MANAGEMENT (OUTPATIENT)
Dept: CALL CENTER | Facility: HOSPITAL | Age: 73
End: 2018-08-17

## 2018-08-17 NOTE — OUTREACH NOTE
[facility] < 7 Survey      Responses   Facility patient discharged from?  [facility]   Does the patient have one of the following disease processes/diagnoses(primary or secondary)?  Other   Is there a successful TCM telephone encounter documented?  No   [facility] <7 Attempt successful?  No   Unsuccessful attempts  Attempt 2          Rachael Sinclair RN

## 2018-08-20 ENCOUNTER — READMISSION MANAGEMENT (OUTPATIENT)
Dept: CALL CENTER | Facility: HOSPITAL | Age: 73
End: 2018-08-20

## 2018-08-20 NOTE — OUTREACH NOTE
[facility] < 7 Survey      Responses   Facility patient discharged from?  [facility]   Does the patient have one of the following disease processes/diagnoses(primary or secondary)?  Other   Is there a successful TCM telephone encounter documented?  No   [facility] <7 Attempt successful?  Yes   Call start time  0903   Call end time  0915   Discharge diagnosis  GI bleed likely diverticular   Is patient permission given to speak with other caregiver?  Yes   Person spoke with today (if not patient) and relationship  Mrs. Le, wife   Medication alerts for this patient  no new scripts ordered, ASA stopped   Meds reviewed with patient/caregiver?  Yes   Is the patient having any side effects they believe may be caused by any medication additions or changes?  No   Does the patient have all medications ordered at discharge?  Yes   Is the patient taking all medications as directed (includes completed medication regime)?  Yes   Does the patient have a primary care provider?   Yes   Does the patient have an appointment with their PCP within 7 days of discharge?  Yes   Comments regarding PCP  Has appt with PCP today on 08/20   Has the patient kept scheduled appointments due by today?  Yes   Has home health visited the patient within 72 hours of discharge?  N/A   Psychosocial issues?  No   Comments  Patient is still weak and and not back to himself. He has appt with his PCP today. No blood in stool noted.    Did the patient receive a copy of their discharge instructions?  Yes   Nursing interventions  Reviewed instructions with patient   What is the patient's perception of their health status since discharge?  Improving   Is the patient/caregiver able to teach back signs and symptoms related to disease process for when to call PCP?  Yes   Is the patient/caregiver able to teach back signs and symptoms related to disease process for when to call 911?  Yes   Is the patient/caregiver able to teach back the hierarchy of who to call/visit for  symptoms/problems? PCP, Specialist, Home health nurse, Urgent Care, ED, 911  Yes   Graduated  Yes          Dwight Galarza RN

## 2018-08-21 ENCOUNTER — TRANSCRIBE ORDERS (OUTPATIENT)
Dept: ADMINISTRATIVE | Facility: HOSPITAL | Age: 73
End: 2018-08-21

## 2018-08-21 DIAGNOSIS — M54.12 CERVICAL RADICULOPATHY: Primary | ICD-10-CM

## 2018-08-24 PROBLEM — K22.710 BARRETT'S ESOPHAGUS WITH LOW GRADE DYSPLASIA: Status: ACTIVE | Noted: 2018-08-24

## 2018-08-27 ENCOUNTER — HOSPITAL ENCOUNTER (OUTPATIENT)
Dept: MRI IMAGING | Facility: HOSPITAL | Age: 73
Discharge: HOME | End: 2018-08-27
Attending: INTERNAL MEDICINE | Admitting: INTERNAL MEDICINE

## 2018-08-27 DIAGNOSIS — M54.12 CERVICAL RADICULOPATHY: ICD-10-CM

## 2018-08-27 PROCEDURE — 72141 MRI NECK SPINE W/O DYE: CPT

## 2018-09-11 ENCOUNTER — OFFICE VISIT (OUTPATIENT)
Dept: GASTROENTEROLOGY | Facility: CLINIC | Age: 73
End: 2018-09-11

## 2018-09-11 VITALS — HEIGHT: 71 IN | WEIGHT: 211.6 LBS | BODY MASS INDEX: 29.62 KG/M2

## 2018-09-11 DIAGNOSIS — K22.711 BARRETT'S ESOPHAGUS WITH HIGH GRADE DYSPLASIA: ICD-10-CM

## 2018-09-11 DIAGNOSIS — K22.710 BARRETT'S ESOPHAGUS WITH LOW GRADE DYSPLASIA: Primary | ICD-10-CM

## 2018-09-11 PROCEDURE — 99213 OFFICE O/P EST LOW 20 MIN: CPT | Performed by: INTERNAL MEDICINE

## 2018-09-11 NOTE — PROGRESS NOTES
PATIENT INFORMATION  Kwabena Le Jr.       - 1945    CHIEF COMPLAINT  Chief Complaint   Patient presents with   • Follow-up     hospital f/u; egd & c/s done 2018       HISTORY OF PRESENT ILLNESS  HPI    He is here today in follow up. He missed 2 appointments with Dr. Rasmussen. There was some confusion with the times and locations. He is on prilosec daily in the morning.   No dysphagia or odynophagia.  The biopsy results were reviewed with him again today.  REVIEW OF SYSTEMS  Review of Systems   All other systems reviewed and are negative.        ACTIVE PROBLEMS  Patient Active Problem List    Diagnosis   • Perez's esophagus with low grade dysplasia [K22.710]   • Acute gastrointestinal hemorrhage [K92.2]         PAST MEDICAL HISTORY  Past Medical History:   Diagnosis Date   • Arthritis    • Perez esophagus    • Colon polyp    • Diabetes mellitus (CMS/HCC)     pt states pre diaabetic   • History of transfusion    • Hypertension    • Myocardial infarction          SURGICAL HISTORY  Past Surgical History:   Procedure Laterality Date   • APPENDECTOMY     • BIOPSY PENILE      couple years ago was benign of prostate   • CARDIAC CATHETERIZATION      hAD FOUR STENTS   • CARDIAC SURGERY     • COLONOSCOPY      10 PLUS YEARS AGO   • COLONOSCOPY N/A 2018    Procedure: COLONOSCOPY and polypectomy;  Surgeon: Mili Iraheta MD;  Location: [facility] OR;  Service: Gastroenterology   • CYSTOSCOPY      2 years ago   • ENDOSCOPY N/A 2018    Procedure: ESOPHAGOGASTRODUODENOSCOPY w/ biopsies;  Surgeon: Mili Iraheta MD;  Location: [facility] OR;  Service: Gastroenterology   • TURP / TRANSURETHRAL INCISION / DRAINAGE PROSTATE      2 years ago         FAMILY HISTORY  Family History   Problem Relation Age of Onset   • Hypertension Mother    • Arthritis Mother    • Heart disease Father    • Heart disease Sister    • Hypertension Maternal Aunt    • Hypertension Maternal Uncle    • Heart disease Maternal Grandfather   "  • Colon cancer Neg Hx    • Colon polyps Neg Hx          SOCIAL HISTORY  Social History     Occupational History   • Not on file.     Social History Main Topics   • Smoking status: Never Smoker   • Smokeless tobacco: Never Used   • Alcohol use No   • Drug use: No   • Sexual activity: Defer         CURRENT MEDICATIONS    Current Outpatient Prescriptions:   •  allopurinol (ZYLOPRIM) 300 MG tablet, Take 300 mg by mouth Daily., Disp: , Rfl:   •  atorvastatin (LIPITOR) 40 MG tablet, Take 40 mg by mouth Daily., Disp: , Rfl:   •  carvedilol (COREG) 12.5 MG tablet, Take 12.5 mg by mouth 2 (Two) Times a Day With Meals., Disp: , Rfl:   •  celecoxib (CeleBREX) 200 MG capsule, Take 200 mg by mouth Daily As Needed for Mild Pain  (for pain in neck and back)., Disp: , Rfl:   •  finasteride (PROSCAR) 5 MG tablet, Take 5 mg by mouth Daily., Disp: , Rfl:   •  lisinopril (PRINIVIL,ZESTRIL) 20 MG tablet, Take 10 mg by mouth Daily., Disp: , Rfl:   •  metFORMIN (GLUCOPHAGE) 850 MG tablet, Take 850 mg by mouth 2 (Two) Times a Day With Meals., Disp: , Rfl:     ALLERGIES  Patient has no known allergies.    VITALS  Vitals:    09/11/18 1405   Weight: 96 kg (211 lb 9.6 oz)   Height: 180.3 cm (70.98\")       LAST RESULTS   Admission on 08/10/2018, Discharged on 08/13/2018   Component Date Value Ref Range Status   • Glucose 08/10/2018 139* 65 - 99 mg/dL Final   • BUN 08/10/2018 24* 8 - 23 mg/dL Final   • Creatinine 08/10/2018 1.17  0.76 - 1.27 mg/dL Final   • Sodium 08/10/2018 139  136 - 145 mmol/L Final   • Potassium 08/10/2018 4.7  3.5 - 5.2 mmol/L Final   • Chloride 08/10/2018 104  98 - 107 mmol/L Final   • CO2 08/10/2018 23.8  22.0 - 29.0 mmol/L Final   • Calcium 08/10/2018 9.3  8.8 - 10.5 mg/dL Final   • Total Protein 08/10/2018 6.2  6.0 - 8.5 g/dL Final   • Albumin 08/10/2018 4.20  3.50 - 5.20 g/dL Final   • ALT (SGPT) 08/10/2018 28  5 - 41 U/L Final   • AST (SGOT) 08/10/2018 18  5 - 40 U/L Final   • Alkaline Phosphatase 08/10/2018 45  40 " - 129 U/L Final   • Total Bilirubin 08/10/2018 0.2  0.2 - 1.2 mg/dL Final   • eGFR Non African Amer 08/10/2018 61  >60 mL/min/1.73 Final   • Globulin 08/10/2018 2.0  gm/dL Final   • A/G Ratio 08/10/2018 2.1  g/dL Final   • BUN/Creatinine Ratio 08/10/2018 20.5  7.0 - 25.0 Final   • Anion Gap 08/10/2018 11.2  mmol/L Final   • Protime 08/10/2018 13.7  12.1 - 15.0 Seconds Final   • INR 08/10/2018 1.05  0.90 - 1.10 Final   • PTT 08/10/2018 30.7  24.3 - 38.1 seconds Final   • Lipase 08/10/2018 22  13 - 60 U/L Final   • ABO Type 08/10/2018 A   Final   • RH type 08/10/2018 Positive   Final   • Antibody Screen 08/10/2018 Negative   Final   • T&S Expiration Date 08/10/2018 8/13/2018 11:59:59 PM   Final   • WBC 08/10/2018 9.67  4.80 - 10.80 10*3/mm3 Final   • RBC 08/10/2018 4.28* 4.70 - 6.10 10*6/mm3 Final   • Hemoglobin 08/10/2018 13.1* 14.0 - 18.0 g/dL Final   • Hematocrit 08/10/2018 40.0* 42.0 - 52.0 % Final   • MCV 08/10/2018 93.5  80.0 - 94.0 fL Final   • MCH 08/10/2018 30.6  27.0 - 31.0 pg Final   • MCHC 08/10/2018 32.8  31.0 - 37.0 g/dL Final   • RDW 08/10/2018 13.2  11.5 - 14.5 % Final   • RDW-SD 08/10/2018 45.0  37.0 - 54.0 fl Final   • MPV 08/10/2018 10.9* 7.4 - 10.4 fL Final   • Platelets 08/10/2018 233  140 - 500 10*3/mm3 Final   • Neutrophil % 08/10/2018 50.9  45.0 - 70.0 % Final   • Lymphocyte % 08/10/2018 38.7  20.0 - 45.0 % Final   • Monocyte % 08/10/2018 8.3* 3.0 - 8.0 % Final   • Eosinophil % 08/10/2018 1.3  0.0 - 4.0 % Final   • Basophil % 08/10/2018 0.5  0.0 - 2.0 % Final   • Immature Grans % 08/10/2018 0.3  0.0 - 0.5 % Final   • Neutrophils, Absolute 08/10/2018 4.92  1.50 - 8.30 10*3/mm3 Final   • Lymphocytes, Absolute 08/10/2018 3.74  0.60 - 4.80 10*3/mm3 Final   • Monocytes, Absolute 08/10/2018 0.80  0.00 - 1.00 10*3/mm3 Final   • Eosinophils, Absolute 08/10/2018 0.13  0.10 - 0.30 10*3/mm3 Final   • Basophils, Absolute 08/10/2018 0.05  0.00 - 0.20 10*3/mm3 Final   • Immature Grans, Absolute 08/10/2018  0.03  0.00 - 0.03 10*3/mm3 Final   • nRBC 08/10/2018 0.0  0.0 - 0.0 /100 WBC Final   • ABO Type 08/10/2018 A   Final   • RH type 08/10/2018 Positive   Final   • Hemoglobin 08/10/2018 11.7* 14.0 - 18.0 g/dL Final   • Hematocrit 08/10/2018 35.8* 42.0 - 52.0 % Final   • Product Code 08/15/2018 T7971X37   Final   • Unit Number 08/15/2018 [id number]   Final   • UNIT  ABO 08/15/2018 A   Final   • UNIT  RH 08/15/2018 POS   Final   • Dispense Status 08/15/2018 RE   Final   • Blood Type 08/15/2018 APOS   Final   • Blood Expiration Date 08/15/2018 870272064622   Final   • Blood Type Barcode 08/15/2018 6200   Final   • Product Code 08/15/2018 [id number]   Final   • Unit Number 08/15/2018 [id number]   Final   • UNIT  ABO 08/15/2018 A   Final   • UNIT  RH 08/15/2018 POS   Final   • Dispense Status 08/15/2018 PT   Final   • Blood Type 08/15/2018 APOS   Final   • Blood Expiration Date 08/15/2018 [id number]   Final   • Blood Type Barcode 08/15/2018 6200   Final   • Glucose 08/11/2018 172* 65 - 99 mg/dL Final   • BUN 08/11/2018 18  8 - 23 mg/dL Final   • Creatinine 08/11/2018 0.94  0.76 - 1.27 mg/dL Final   • Sodium 08/11/2018 140  136 - 145 mmol/L Final   • Potassium 08/11/2018 4.3  3.5 - 5.2 mmol/L Final   • Chloride 08/11/2018 105  98 - 107 mmol/L Final   • CO2 08/11/2018 24.9  22.0 - 29.0 mmol/L Final   • Calcium 08/11/2018 8.4* 8.8 - 10.5 mg/dL Final   • eGFR Non African Amer 08/11/2018 79  >60 mL/min/1.73 Final   • BUN/Creatinine Ratio 08/11/2018 19.1  7.0 - 25.0 Final   • Anion Gap 08/11/2018 10.1  mmol/L Final   • WBC 08/11/2018 10.94* 4.80 - 10.80 10*3/mm3 Final   • RBC 08/11/2018 3.41* 4.70 - 6.10 10*6/mm3 Final   • Hemoglobin 08/11/2018 10.6* 14.0 - 18.0 g/dL Final   • Hematocrit 08/11/2018 32.2* 42.0 - 52.0 % Final   • MCV 08/11/2018 94.4* 80.0 - 94.0 fL Final   • MCH 08/11/2018 31.1* 27.0 - 31.0 pg Final   • MCHC 08/11/2018 32.9  31.0 - 37.0 g/dL Final   • RDW 08/11/2018 13.3  11.5 - 14.5 % Final   • RDW-SD  08/11/2018 45.8  37.0 - 54.0 fl Final   • MPV 08/11/2018 10.5* 7.4 - 10.4 fL Final   • Platelets 08/11/2018 216  140 - 500 10*3/mm3 Final   • Neutrophil % 08/11/2018 65.5  45.0 - 70.0 % Final   • Lymphocyte % 08/11/2018 28.6  20.0 - 45.0 % Final   • Monocyte % 08/11/2018 5.0  3.0 - 8.0 % Final   • Eosinophil % 08/11/2018 0.3  0.0 - 4.0 % Final   • Basophil % 08/11/2018 0.3  0.0 - 2.0 % Final   • Immature Grans % 08/11/2018 0.3  0.0 - 0.5 % Final   • Neutrophils, Absolute 08/11/2018 7.17  1.50 - 8.30 10*3/mm3 Final   • Lymphocytes, Absolute 08/11/2018 3.13  0.60 - 4.80 10*3/mm3 Final   • Monocytes, Absolute 08/11/2018 0.55  0.00 - 1.00 10*3/mm3 Final   • Eosinophils, Absolute 08/11/2018 0.03* 0.10 - 0.30 10*3/mm3 Final   • Basophils, Absolute 08/11/2018 0.03  0.00 - 0.20 10*3/mm3 Final   • Immature Grans, Absolute 08/11/2018 0.03  0.00 - 0.03 10*3/mm3 Final   • nRBC 08/11/2018 0.0  0.0 - 0.0 /100 WBC Final   • Glucose 08/11/2018 187* 70 - 130 mg/dL Final   • Case Report 08/11/2018    Final                    Value:Surgical Pathology Report                         Case: [id number]                                  Authorizing Provider:  Mili Iraheta MD         Collected:           08/11/2018 10:04 AM          Ordering Location:     [facility]   Received:            08/11/2018 10:24 AM                                 OR                                                                           Pathologist:           Jackie Arriaga MD                                                    Specimens:   1) - Stomach, gastric biopsy                                                                        2) - Esophagus, Distal, distal esophagus biopsy                                           • Final Diagnosis 08/11/2018    Final                    Value:This result contains rich text formatting which cannot be displayed here.   • Hemoglobin 08/11/2018 9.5* 14.0 - 18.0 g/dL Final   • Hematocrit 08/11/2018  28.8* 42.0 - 52.0 % Final   • WBC 08/12/2018 8.21  4.80 - 10.80 10*3/mm3 Final   • RBC 08/12/2018 2.80* 4.70 - 6.10 10*6/mm3 Final   • Hemoglobin 08/12/2018 8.6* 14.0 - 18.0 g/dL Final   • Hematocrit 08/12/2018 26.1* 42.0 - 52.0 % Final   • MCV 08/12/2018 93.2  80.0 - 94.0 fL Final   • MCH 08/12/2018 30.7  27.0 - 31.0 pg Final   • MCHC 08/12/2018 33.0  31.0 - 37.0 g/dL Final   • RDW 08/12/2018 13.3  11.5 - 14.5 % Final   • RDW-SD 08/12/2018 45.5  37.0 - 54.0 fl Final   • MPV 08/12/2018 10.9* 7.4 - 10.4 fL Final   • Platelets 08/12/2018 174  140 - 500 10*3/mm3 Final   • Glucose 08/12/2018 160* 65 - 99 mg/dL Final   • BUN 08/12/2018 13  8 - 23 mg/dL Final   • Creatinine 08/12/2018 0.88  0.76 - 1.27 mg/dL Final   • Sodium 08/12/2018 137  136 - 145 mmol/L Final   • Potassium 08/12/2018 4.0  3.5 - 5.2 mmol/L Final   • Chloride 08/12/2018 101  98 - 107 mmol/L Final   • CO2 08/12/2018 27.7  22.0 - 29.0 mmol/L Final   • Calcium 08/12/2018 8.7* 8.8 - 10.5 mg/dL Final   • eGFR Non  Amer 08/12/2018 85  >60 mL/min/1.73 Final   • BUN/Creatinine Ratio 08/12/2018 14.8  7.0 - 25.0 Final   • Anion Gap 08/12/2018 8.3  mmol/L Final   • Hemoglobin 08/12/2018 9.7* 14.0 - 18.0 g/dL Final   • Hematocrit 08/12/2018 29.5* 42.0 - 52.0 % Final   • WBC 08/13/2018 8.17  4.80 - 10.80 10*3/mm3 Final   • RBC 08/13/2018 2.56* 4.70 - 6.10 10*6/mm3 Final   • Hemoglobin 08/13/2018 7.9* 14.0 - 18.0 g/dL Final   • Hematocrit 08/13/2018 24.3* 42.0 - 52.0 % Final   • MCV 08/13/2018 94.9* 80.0 - 94.0 fL Final   • MCH 08/13/2018 30.9  27.0 - 31.0 pg Final   • MCHC 08/13/2018 32.5  31.0 - 37.0 g/dL Final   • RDW 08/13/2018 13.6  11.5 - 14.5 % Final   • RDW-SD 08/13/2018 46.3  37.0 - 54.0 fl Final   • MPV 08/13/2018 10.9* 7.4 - 10.4 fL Final   • Platelets 08/13/2018 171  140 - 500 10*3/mm3 Final   • Glucose 08/13/2018 130* 65 - 99 mg/dL Final   • BUN 08/13/2018 10  8 - 23 mg/dL Final   • Creatinine 08/13/2018 0.97  0.76 - 1.27 mg/dL Final   • Sodium  08/13/2018 140  136 - 145 mmol/L Final   • Potassium 08/13/2018 3.8  3.5 - 5.2 mmol/L Final   • Chloride 08/13/2018 103  98 - 107 mmol/L Final   • CO2 08/13/2018 28.6  22.0 - 29.0 mmol/L Final   • Calcium 08/13/2018 8.4* 8.8 - 10.5 mg/dL Final   • eGFR Non African Amer 08/13/2018 76  >60 mL/min/1.73 Final   • BUN/Creatinine Ratio 08/13/2018 10.3  7.0 - 25.0 Final   • Anion Gap 08/13/2018 8.4  mmol/L Final   • Case Report 08/13/2018    Final                    Value:Surgical Pathology Report                         Case: [id number]                                  Authorizing Provider:  Mili Iraheta MD         Collected:           08/13/2018 08:52 AM          Ordering Location:     [facility]   Received:            08/13/2018 01:02 PM                                 OR                                                                           Pathologist:           Jeremi Small MD                                                     Specimens:   1) - Large Intestine, Right / Ascending Colon, ascending colon polyp x4                             2) - Large Intestine, Transverse Colon, transverse colon polyp x3                                   3) - Large Intestine, Sigmoid Colon, sigmoid colon polyp                                  • Final Diagnosis 08/13/2018    Final                    Value:This result contains rich text formatting which cannot be displayed here.   • Hemoglobin 08/13/2018 9.8* 14.0 - 18.0 g/dL Final   • Hematocrit 08/13/2018 29.5* 42.0 - 52.0 % Final     Mri Cervical Spine Without Contrast    Result Date: 8/30/2018  Narrative: MRI CERVICAL SPINE WITHOUT CONTRAST 08/27/2018 COMPARISON: 05/20/2015 procedure routine cervical spine MRI without contrast. HISTORY: Neck pain for 6 months radiating to the right shoulder with pain and stiffness. FINDINGS: Spine alignment is normal. Bone marrow signal is within normal limits allowing for mild degenerative changes. Cervical cord signal  is normal. The posterior fossa and its contents are normal. The paraspinous soft tissues are unremarkable. At C2-3, the canal and right foramen are normal. There is mild left foraminal narrowing. At C3-4, there is no canal stenosis. There is minimal right and moderate left foraminal narrowing. At C4-5, there is mild canal stenosis and possibly slight cord compression. There is borderline right and moderate to severe left foraminal stenosis. At C5-6, there is a bony ridge with mild canal stenosis and possible slight right side cord compression and mild to moderate left and moderate right foraminal stenosis. At C6-7, there is a bony ridge with mild canal stenosis and mild cord compression, and mild to moderate left and borderline to mild right foraminal stenosis. At C7-T1, there is no canal stenosis and there is mild right and moderate left foraminal stenosis.     Impression:  Degenerative changes as above. No acute abnormality at any level.      PHYSICAL EXAM  Physical Exam   Constitutional: He is oriented to person, place, and time. He appears well-developed and well-nourished. No distress.   HENT:   Head: Normocephalic and atraumatic.   Eyes: Pupils are equal, round, and reactive to light. EOM are normal.   Neck: Neck supple. No tracheal deviation present.   Cardiovascular: Normal rate, regular rhythm, normal heart sounds and intact distal pulses.  Exam reveals no gallop and no friction rub.    No murmur heard.  Pulmonary/Chest: Effort normal and breath sounds normal. No respiratory distress. He has no wheezes. He has no rales. He exhibits no tenderness.   Abdominal: Soft. Bowel sounds are normal. He exhibits no distension. There is no tenderness. There is no rebound and no guarding.   Musculoskeletal: He exhibits no edema.   Lymphadenopathy:     He has no cervical adenopathy.   Neurological: He is alert and oriented to person, place, and time.   Skin: Skin is warm. He is not diaphoretic. No erythema.    Psychiatric: He has a normal mood and affect. His behavior is normal. Judgment and thought content normal.   Nursing note and vitals reviewed.      ASSESSMENT  Diagnoses and all orders for this visit:    Perez's esophagus with low grade dysplasia    Perez's esophagus with high grade dysplasia          PLAN  No Follow-up on file.    barretts esophagus with low grade and area of high grade dysplasia  He has missed is last 2 appointments but was sincerely confused.  Discussed risk of cancer and need for follow up    Will reschedule appt with Dr. Rasmussen  Continue on ppi

## 2018-09-18 ENCOUNTER — OFFICE VISIT (OUTPATIENT)
Dept: GASTROENTEROLOGY | Facility: CLINIC | Age: 73
End: 2018-09-18

## 2018-09-18 VITALS
SYSTOLIC BLOOD PRESSURE: 148 MMHG | BODY MASS INDEX: 28.84 KG/M2 | DIASTOLIC BLOOD PRESSURE: 88 MMHG | WEIGHT: 206 LBS | TEMPERATURE: 98.1 F | HEIGHT: 71 IN

## 2018-09-18 DIAGNOSIS — K22.711 BARRETT'S ESOPHAGUS WITH HIGH GRADE DYSPLASIA: Primary | ICD-10-CM

## 2018-09-18 PROCEDURE — 99214 OFFICE O/P EST MOD 30 MIN: CPT | Performed by: INTERNAL MEDICINE

## 2018-09-18 RX ORDER — OMEPRAZOLE 40 MG/1
CAPSULE, DELAYED RELEASE ORAL
COMMUNITY
End: 2020-12-29 | Stop reason: SDUPTHER

## 2018-09-18 RX ORDER — FERROUS SULFATE 325(65) MG
1 TABLET ORAL DAILY
Refills: 1 | COMMUNITY
Start: 2018-09-13 | End: 2020-12-29 | Stop reason: SDUPTHER

## 2018-09-18 RX ORDER — ASPIRIN 81 MG/1
81 TABLET ORAL DAILY
COMMUNITY
End: 2020-12-29 | Stop reason: SDUPTHER

## 2018-09-18 NOTE — PROGRESS NOTES
Chief Complaint   Patient presents with   • Pre-op Exam     HALO     Kwabena Le Jr. is a 73 y.o. male who presents with a history of Perez's esophagus with high-grade dysplasia   HPI     Patient 73-year-old male with history of coronary artery disease, hypertension and diabetes who presented with anemia.  During evaluation EGD revealed Perez's esophagus with low-grade dysplasia along with focal high-grade dysplasia.  Patient referred now for evaluation for radiofrequency ablation.  Patient denies heartburn or reflux.  Patient reports no fever or chills no melena or bright red blood per rectum.  Since he then patient has been tolerating diet well without complaints.    Past Medical History:   Diagnosis Date   • Arthritis    • Perez esophagus    • Colon polyp    • Diabetes mellitus (CMS/HCC)     pt states pre diaabetic   • History of transfusion    • Hypertension    • Myocardial infarction        Current Outpatient Prescriptions:   •  allopurinol (ZYLOPRIM) 300 MG tablet, Take 300 mg by mouth Daily., Disp: , Rfl:   •  aspirin 81 MG EC tablet, Take 81 mg by mouth Daily., Disp: , Rfl:   •  atorvastatin (LIPITOR) 40 MG tablet, Take 40 mg by mouth Daily., Disp: , Rfl:   •  carvedilol (COREG) 12.5 MG tablet, Take 12.5 mg by mouth 2 (Two) Times a Day With Meals., Disp: , Rfl:   •  celecoxib (CeleBREX) 200 MG capsule, Take 200 mg by mouth Daily As Needed for Mild Pain  (for pain in neck and back)., Disp: , Rfl:   •  ferrous sulfate 325 (65 FE) MG tablet, Take 1 tablet by mouth Daily., Disp: , Rfl: 1  •  finasteride (PROSCAR) 5 MG tablet, Take 5 mg by mouth Daily., Disp: , Rfl:   •  lisinopril (PRINIVIL,ZESTRIL) 20 MG tablet, Take 10 mg by mouth Daily., Disp: , Rfl:   •  metFORMIN (GLUCOPHAGE) 850 MG tablet, Take 850 mg by mouth 2 (Two) Times a Day With Meals., Disp: , Rfl:   •  omeprazole (priLOSEC) 40 MG capsule, omeprazole 40 mg capsule,delayed release, Disp: , Rfl:   No Known Allergies  Social History      Social History   • Marital status:      Spouse name: N/A   • Number of children: N/A   • Years of education: N/A     Occupational History   • Not on file.     Social History Main Topics   • Smoking status: Never Smoker   • Smokeless tobacco: Never Used   • Alcohol use No   • Drug use: No   • Sexual activity: Defer     Other Topics Concern   • Not on file     Social History Narrative   • No narrative on file     Family History   Problem Relation Age of Onset   • Hypertension Mother    • Arthritis Mother    • Heart disease Father    • Heart disease Sister    • Hypertension Maternal Aunt    • Hypertension Maternal Uncle    • Heart disease Maternal Grandfather    • Colon cancer Neg Hx    • Colon polyps Neg Hx      Review of Systems   Constitutional: Negative.    Eyes: Negative.    Respiratory: Negative.    Cardiovascular: Negative.    Gastrointestinal: Negative.    Musculoskeletal: Negative.    Skin: Negative.    Allergic/Immunologic: Negative.    Hematological: Negative.      Vitals:    09/18/18 1346   BP: 148/88   Temp: 98.1 °F (36.7 °C)     Physical Exam   Constitutional: He is oriented to person, place, and time. He appears well-developed and well-nourished.   HENT:   Head: Normocephalic and atraumatic.   Eyes: Pupils are equal, round, and reactive to light. EOM are normal. No scleral icterus.   Neck: Normal range of motion. No tracheal deviation present.   Cardiovascular: Normal rate and regular rhythm.  Exam reveals no gallop and no friction rub.    No murmur heard.  Pulmonary/Chest: Effort normal and breath sounds normal. No respiratory distress. He has no wheezes. He has no rales.   Abdominal: Soft. Bowel sounds are normal. He exhibits no distension and no mass. There is no tenderness. There is no rebound and no guarding.   Musculoskeletal: Normal range of motion. He exhibits no edema.   Neurological: He is alert and oriented to person, place, and time. No cranial nerve deficit.   Skin: Skin is warm  and dry. No rash noted.   Psychiatric: He has a normal mood and affect. His behavior is normal.   Nursing note and vitals reviewed.    Diagnoses and all orders for this visit:    Perez's esophagus with high grade dysplasia  -     Case Request; Standing  -     Case Request    Other orders  -     omeprazole (priLOSEC) 40 MG capsule; omeprazole 40 mg capsule,delayed release  -     ferrous sulfate 325 (65 FE) MG tablet; Take 1 tablet by mouth Daily.  -     aspirin 81 MG EC tablet; Take 81 mg by mouth Daily.  -     Follow Anesthesia Guidelines / Standing Orders; Future  -     Implement Anesthesia orders day of procedure.; Standing  -     Obtain informed consent; Standing    Patient 73-year-old male with history of coronary artery disease, hypertension and diabetes who presented with GI bleed.  Patient underwent EGD revealing Perez's esophagus with high-grade dysplasia focally among low-grade dysplasia.  Patient referred now for evaluation for possible halo ablation.  Patient currently reports little to no heartburn or reflux.  Patient reports never really had a problem with that.  Patient currently on iron to replace the iron loss as well as omeprazole 40 mg daily.  At this point will arrange radiofrequency ablation to remove a focal abnormality and monitor clinically.  Procedure was detailed and patient questions were answered.  Patient agreed to proceed.

## 2018-09-21 ENCOUNTER — HOSPITAL ENCOUNTER (OUTPATIENT)
Facility: HOSPITAL | Age: 73
Setting detail: HOSPITAL OUTPATIENT SURGERY
Discharge: HOME | End: 2018-09-21
Attending: INTERNAL MEDICINE | Admitting: INTERNAL MEDICINE

## 2018-09-21 ENCOUNTER — ANESTHESIA EVENT (OUTPATIENT)
Dept: GASTROENTEROLOGY | Facility: HOSPITAL | Age: 73
End: 2018-09-21

## 2018-09-21 ENCOUNTER — ANESTHESIA (OUTPATIENT)
Dept: GASTROENTEROLOGY | Facility: HOSPITAL | Age: 73
End: 2018-09-21

## 2018-09-21 VITALS
TEMPERATURE: 98.1 F | BODY MASS INDEX: 28.48 KG/M2 | RESPIRATION RATE: 18 BRPM | WEIGHT: 204.13 LBS | HEART RATE: 67 BPM | SYSTOLIC BLOOD PRESSURE: 132 MMHG | DIASTOLIC BLOOD PRESSURE: 76 MMHG | OXYGEN SATURATION: 98 %

## 2018-09-21 LAB — GLUCOSE BLDC GLUCOMTR-MCNC: 158 MG/DL (ref 70–130)

## 2018-09-21 PROCEDURE — 25010000002 PROPOFOL 10 MG/ML EMULSION: Performed by: ANESTHESIOLOGY

## 2018-09-21 PROCEDURE — C1733 CATH, EP, OTHR THAN COOL-TIP: HCPCS | Performed by: INTERNAL MEDICINE

## 2018-09-21 PROCEDURE — 82962 GLUCOSE BLOOD TEST: CPT

## 2018-09-21 PROCEDURE — 43229 ESOPHAGOSCOPY LESION ABLATE: CPT | Performed by: INTERNAL MEDICINE

## 2018-09-21 RX ORDER — SODIUM CHLORIDE, SODIUM LACTATE, POTASSIUM CHLORIDE, CALCIUM CHLORIDE 600; 310; 30; 20 MG/100ML; MG/100ML; MG/100ML; MG/100ML
1000 INJECTION, SOLUTION INTRAVENOUS CONTINUOUS
Status: DISCONTINUED | OUTPATIENT
Start: 2018-09-21 | End: 2018-09-21 | Stop reason: HOSPADM

## 2018-09-21 RX ORDER — PROPOFOL 10 MG/ML
VIAL (ML) INTRAVENOUS CONTINUOUS PRN
Status: DISCONTINUED | OUTPATIENT
Start: 2018-09-21 | End: 2018-09-21 | Stop reason: SURG

## 2018-09-21 RX ORDER — LIDOCAINE HYDROCHLORIDE 20 MG/ML
INJECTION, SOLUTION INFILTRATION; PERINEURAL AS NEEDED
Status: DISCONTINUED | OUTPATIENT
Start: 2018-09-21 | End: 2018-09-21 | Stop reason: SURG

## 2018-09-21 RX ORDER — PROPOFOL 10 MG/ML
VIAL (ML) INTRAVENOUS AS NEEDED
Status: DISCONTINUED | OUTPATIENT
Start: 2018-09-21 | End: 2018-09-21 | Stop reason: SURG

## 2018-09-21 RX ORDER — SUCRALFATE 1 G/1
1 TABLET ORAL 4 TIMES DAILY
Qty: 120 TABLET | Refills: 2 | Status: SHIPPED | OUTPATIENT
Start: 2018-09-21 | End: 2019-07-25

## 2018-09-21 RX ORDER — GLYCOPYRROLATE 0.2 MG/ML
INJECTION INTRAMUSCULAR; INTRAVENOUS AS NEEDED
Status: DISCONTINUED | OUTPATIENT
Start: 2018-09-21 | End: 2018-09-21 | Stop reason: SURG

## 2018-09-21 RX ORDER — HYDROCODONE BITARTRATE AND ACETAMINOPHEN 5; 325 MG/1; MG/1
1 TABLET ORAL EVERY 6 HOURS PRN
Qty: 40 TABLET | Refills: 0 | Status: SHIPPED | OUTPATIENT
Start: 2018-09-21 | End: 2019-07-25

## 2018-09-21 RX ADMIN — SODIUM CHLORIDE, POTASSIUM CHLORIDE, SODIUM LACTATE AND CALCIUM CHLORIDE 1000 ML: 600; 310; 30; 20 INJECTION, SOLUTION INTRAVENOUS at 10:22

## 2018-09-21 RX ADMIN — PROPOFOL 200 MCG/KG/MIN: 10 INJECTION, EMULSION INTRAVENOUS at 10:56

## 2018-09-21 RX ADMIN — GLYCOPYRROLATE 0.2 MG: 0.2 INJECTION INTRAMUSCULAR; INTRAVENOUS at 10:56

## 2018-09-21 RX ADMIN — PROPOFOL 130 MG: 10 INJECTION, EMULSION INTRAVENOUS at 10:56

## 2018-09-21 RX ADMIN — LIDOCAINE HYDROCHLORIDE 60 MG: 20 INJECTION, SOLUTION INFILTRATION; PERINEURAL at 10:56

## 2018-09-21 NOTE — BRIEF OP NOTE
ESOPHAGOSCOPY WITH RADIO FREQUENCY ABLATION  Progress Note    Kwabena Le JrLeopoldo  9/21/2018    Pre-op Diagnosis:   Perez's esophagus with high grade dysplasia [K22.711]       Post-Op Diagnosis Codes:     * Perez's esophagus with high grade dysplasia [K22.711]    Procedure/CPT® Codes:      Procedure(s):  ESOPHAGOSCOPY WITH RADIO FREQUENCY ABLATION    Surgeon(s):  Jean Rasmussen MD    Anesthesia: Monitor Anesthesia Care    Staff:   Endo Technician: Lisa Sommers  Endo Nurse: Petra Badillo RN    Estimated Blood Loss: minimal    Urine Voided: * No values recorded between 9/21/2018 10:49 AM and 9/21/2018 11:04 AM *    Specimens:                None      Drains:      Findings: Patient with known Perez's on biopsy with approximately 2 cm distal esophageal salmon-colored mucosa treated with radiofrequency ablation.  Patient tolerated well.    Complications: None      Jean Rasmussen MD     Date: 9/21/2018  Time: 11:04 AM

## 2018-09-21 NOTE — ANESTHESIA POSTPROCEDURE EVALUATION
Patient: Kwabena Le Jr.    Procedure Summary     Date:  09/21/18 Room / Location:   [facility]    Anesthesia Start:  1049 Anesthesia Stop:  1112    Procedure:  ESOPHAGOSCOPY WITH FOCAL RADIO FREQUENCY ABLATION (N/A Esophagus) Diagnosis:       Perez's esophagus with high grade dysplasia      (Perez's esophagus with high grade dysplasia [K22.711])    Surgeon:  Jean Rasmussen MD Provider:  Jaswant Centeno MD    Anesthesia Type:  MAC ASA Status:  3          Anesthesia Type: MAC  Last vitals  BP   132/76 (09/21/18 1131)   Temp   36.7 °C (98.1 °F) (09/21/18 1117)   Pulse   67 (09/21/18 1131)   Resp   18 (09/21/18 1131)     SpO2   98 % (09/21/18 1131)     Post Anesthesia Care and Evaluation    Patient location during evaluation: bedside  Patient participation: complete - patient participated  Level of consciousness: awake and alert  Pain score: 0  Pain management: adequate  Airway patency: patent  Anesthetic complications: No anesthetic complications    Cardiovascular status: acceptable  Respiratory status: acceptable  Hydration status: acceptable    Comments: /76   Pulse 67   Temp 36.7 °C (98.1 °F)   Resp 18   Wt 92.6 kg (204 lb 2 oz)   SpO2 98%   BMI 28.48 kg/m²

## 2018-09-21 NOTE — DISCHARGE INSTRUCTIONS
Esophagogastroduodenoscopy, Care After  Refer to this sheet in the next few weeks. These instructions provide you with information about caring for yourself after your procedure. Your health care provider may also give you more specific instructions. Your treatment has been planned according to current medical practices, but problems sometimes occur. Call your health care provider if you have any problems or questions after your procedure.  What can I expect after the procedure?  After the procedure, it is common to have:  · A sore throat.  · Nausea.  · Bloating.  · Dizziness.  · Fatigue.    Follow these instructions at home:  · Do not eat or drink anything until the numbing medicine (local anesthetic) has worn off and your gag reflex has returned. You will know that the local anesthetic has worn off when you can swallow comfortably.  · Do not drive for 24 hours if you received a medicine to help you relax (sedative).  · If your health care provider took a tissue sample for testing during the procedure, make sure to get your test results. This is your responsibility. Ask your health care provider or the department performing the test when your results will be ready.  · Keep all follow-up visits as told by your health care provider. This is important.  Contact a health care provider if:  · You cannot stop coughing.  · You are not urinating.  · You are urinating less than usual.  Get help right away if:  · You have trouble swallowing.  · You cannot eat or drink.  · You have throat or chest pain that gets worse.  · You are dizzy or light-headed.  · You faint.  · You have nausea or vomiting.  · You have chills.  · You have a fever.  · You have severe abdominal pain.  · You have black, tarry, or bloody stools.  This information is not intended to replace advice given to you by your health care provider. Make sure you discuss any questions you have with your health care provider.  Document Released: 12/04/2013 Document  Revised: 05/25/2017 Document Reviewed: 11/10/2016  Guide Interactive Patient Education © 2018 [employer]

## 2018-09-21 NOTE — ANESTHESIA PREPROCEDURE EVALUATION
Anesthesia Evaluation     Patient summary reviewed and Nursing notes reviewed   NPO Solid Status: > 8 hours  NPO Liquid Status: > 2 hours           Airway   Mallampati: II  TM distance: >3 FB  Neck ROM: full  Dental - normal exam     Pulmonary - negative pulmonary ROS and normal exam    breath sounds clear to auscultation  Cardiovascular - normal exam    Patient on routine beta blocker  Rhythm: regular  Rate: normal    (+) past MI , CAD, hyperlipidemia,   (-) angina, orthopnea, PND, TANG      Neuro/Psych- negative ROS  GI/Hepatic/Renal/Endo    (+)  GERD, GI bleeding, diabetes mellitus type 2,     Musculoskeletal (-) negative ROS    Abdominal    Substance History - negative use     OB/GYN negative ob/gyn ROS         Other - negative ROS                       Anesthesia Plan    ASA 3     MAC     Anesthetic plan, all risks, benefits, and alternatives have been provided, discussed and informed consent has been obtained with: patient.

## 2018-11-13 ENCOUNTER — TELEPHONE (OUTPATIENT)
Dept: GASTROENTEROLOGY | Facility: CLINIC | Age: 73
End: 2018-11-13

## 2018-11-13 DIAGNOSIS — K22.719 BARRETT'S ESOPHAGUS WITH DYSPLASIA: Primary | ICD-10-CM

## 2018-11-13 NOTE — TELEPHONE ENCOUNTER
----- Message from Linus Delacruz sent at 11/13/2018  8:57 AM EST -----  Regarding: ESOPHAGOSCOPY W/ RADIO FREQUENCY ABLATION  Contact: [phone]  questions about this test

## 2018-11-13 NOTE — TELEPHONE ENCOUNTER
Returned patient's phone call, spoke with wife she states the patient is questioning when he should have a repeat EGD with ablation. Per verbal order of Dr. Rasmussen the patient's next ablation will be done sometime in December. Spouse verb understanding, will relay the message to the patient.  Advised if he should have any questions have him call back.

## 2020-02-19 ENCOUNTER — HOSPITAL ENCOUNTER (EMERGENCY)
Facility: HOSPITAL | Age: 75
Discharge: HOME | End: 2020-02-19
Attending: EMERGENCY MEDICINE | Admitting: EMERGENCY MEDICINE

## 2020-02-19 ENCOUNTER — APPOINTMENT (OUTPATIENT)
Dept: GENERAL RADIOLOGY | Facility: HOSPITAL | Age: 75
End: 2020-02-19

## 2020-02-19 VITALS
WEIGHT: 215 LBS | HEART RATE: 77 BPM | DIASTOLIC BLOOD PRESSURE: 75 MMHG | HEIGHT: 71 IN | SYSTOLIC BLOOD PRESSURE: 123 MMHG | BODY MASS INDEX: 30.1 KG/M2 | TEMPERATURE: 98 F | RESPIRATION RATE: 16 BRPM | OXYGEN SATURATION: 96 %

## 2020-02-19 DIAGNOSIS — I47.1 SVT (SUPRAVENTRICULAR TACHYCARDIA) (HCC): Primary | ICD-10-CM

## 2020-02-19 LAB
ALBUMIN SERPL-MCNC: 4.1 G/DL (ref 3.5–5.2)
ALBUMIN/GLOB SERPL: 1.7 G/DL
ALP SERPL-CCNC: 46 U/L (ref 39–117)
ALT SERPL W P-5'-P-CCNC: 17 U/L (ref 1–41)
ANION GAP SERPL CALCULATED.3IONS-SCNC: 15.9 MMOL/L (ref 5–15)
AST SERPL-CCNC: 14 U/L (ref 1–40)
BASOPHILS # BLD AUTO: 0.05 10*3/MM3 (ref 0–0.2)
BASOPHILS NFR BLD AUTO: 0.5 % (ref 0–1.5)
BILIRUB SERPL-MCNC: 0.3 MG/DL (ref 0.2–1.2)
BILIRUB UR QL STRIP: NEGATIVE
BUN BLD-MCNC: 27 MG/DL (ref 8–23)
BUN/CREAT SERPL: 20.8 (ref 7–25)
CALCIUM SPEC-SCNC: 9 MG/DL (ref 8.6–10.5)
CHLORIDE SERPL-SCNC: 100 MMOL/L (ref 98–107)
CLARITY UR: CLEAR
CO2 SERPL-SCNC: 23.1 MMOL/L (ref 22–29)
COLOR UR: YELLOW
CREAT BLD-MCNC: 1.3 MG/DL (ref 0.76–1.27)
DEPRECATED RDW RBC AUTO: 44 FL (ref 37–54)
EOSINOPHIL # BLD AUTO: 0.09 10*3/MM3 (ref 0–0.4)
EOSINOPHIL NFR BLD AUTO: 0.9 % (ref 0.3–6.2)
ERYTHROCYTE [DISTWIDTH] IN BLOOD BY AUTOMATED COUNT: 13.2 % (ref 12.3–15.4)
GFR SERPL CREATININE-BSD FRML MDRD: 54 ML/MIN/1.73
GLOBULIN UR ELPH-MCNC: 2.4 GM/DL
GLUCOSE BLD-MCNC: 152 MG/DL (ref 65–99)
GLUCOSE UR STRIP-MCNC: NEGATIVE MG/DL
HCT VFR BLD AUTO: 43.2 % (ref 37.5–51)
HGB BLD-MCNC: 14.1 G/DL (ref 13–17.7)
HGB UR QL STRIP.AUTO: NEGATIVE
IMM GRANULOCYTES # BLD AUTO: 0.03 10*3/MM3 (ref 0–0.05)
IMM GRANULOCYTES NFR BLD AUTO: 0.3 % (ref 0–0.5)
INR PPP: 1.01 (ref 0.9–1.1)
KETONES UR QL STRIP: ABNORMAL
LEUKOCYTE ESTERASE UR QL STRIP.AUTO: NEGATIVE
LYMPHOCYTES # BLD AUTO: 2.5 10*3/MM3 (ref 0.7–3.1)
LYMPHOCYTES NFR BLD AUTO: 24.8 % (ref 19.6–45.3)
MAGNESIUM SERPL-MCNC: 1.4 MG/DL (ref 1.6–2.4)
MCH RBC QN AUTO: 29.8 PG (ref 26.6–33)
MCHC RBC AUTO-ENTMCNC: 32.6 G/DL (ref 31.5–35.7)
MCV RBC AUTO: 91.3 FL (ref 79–97)
MONOCYTES # BLD AUTO: 0.91 10*3/MM3 (ref 0.1–0.9)
MONOCYTES NFR BLD AUTO: 9 % (ref 5–12)
NEUTROPHILS # BLD AUTO: 6.51 10*3/MM3 (ref 1.7–7)
NEUTROPHILS NFR BLD AUTO: 64.5 % (ref 42.7–76)
NITRITE UR QL STRIP: NEGATIVE
NRBC BLD AUTO-RTO: 0 /100 WBC (ref 0–0.2)
NT-PROBNP SERPL-MCNC: 48.6 PG/ML (ref 5–900)
PH UR STRIP.AUTO: <=5 [PH] (ref 5–8)
PLATELET # BLD AUTO: 232 10*3/MM3 (ref 140–450)
PMV BLD AUTO: 10.4 FL (ref 6–12)
POTASSIUM BLD-SCNC: 4.1 MMOL/L (ref 3.5–5.2)
PROT SERPL-MCNC: 6.5 G/DL (ref 6–8.5)
PROT UR QL STRIP: NEGATIVE
PROTHROMBIN TIME: 13 SECONDS (ref 11.7–14.2)
RBC # BLD AUTO: 4.73 10*6/MM3 (ref 4.14–5.8)
SODIUM BLD-SCNC: 139 MMOL/L (ref 136–145)
SP GR UR STRIP: 1.02 (ref 1–1.03)
TROPONIN T SERPL-MCNC: <0.01 NG/ML (ref 0–0.03)
TSH SERPL DL<=0.05 MIU/L-ACNC: 1.67 UIU/ML (ref 0.27–4.2)
UROBILINOGEN UR QL STRIP: ABNORMAL
WBC NRBC COR # BLD: 10.09 10*3/MM3 (ref 3.4–10.8)

## 2020-02-19 PROCEDURE — 99284 EMERGENCY DEPT VISIT MOD MDM: CPT

## 2020-02-19 PROCEDURE — 93010 ELECTROCARDIOGRAM REPORT: CPT | Performed by: INTERNAL MEDICINE

## 2020-02-19 PROCEDURE — 85610 PROTHROMBIN TIME: CPT | Performed by: NURSE PRACTITIONER

## 2020-02-19 PROCEDURE — 81003 URINALYSIS AUTO W/O SCOPE: CPT | Performed by: NURSE PRACTITIONER

## 2020-02-19 PROCEDURE — 84484 ASSAY OF TROPONIN QUANT: CPT | Performed by: NURSE PRACTITIONER

## 2020-02-19 PROCEDURE — 83880 ASSAY OF NATRIURETIC PEPTIDE: CPT | Performed by: NURSE PRACTITIONER

## 2020-02-19 PROCEDURE — 71045 X-RAY EXAM CHEST 1 VIEW: CPT

## 2020-02-19 PROCEDURE — 93005 ELECTROCARDIOGRAM TRACING: CPT | Performed by: NURSE PRACTITIONER

## 2020-02-19 PROCEDURE — 83735 ASSAY OF MAGNESIUM: CPT | Performed by: NURSE PRACTITIONER

## 2020-02-19 PROCEDURE — 85025 COMPLETE CBC W/AUTO DIFF WBC: CPT | Performed by: NURSE PRACTITIONER

## 2020-02-19 PROCEDURE — 84443 ASSAY THYROID STIM HORMONE: CPT | Performed by: NURSE PRACTITIONER

## 2020-02-19 PROCEDURE — 80053 COMPREHEN METABOLIC PANEL: CPT | Performed by: NURSE PRACTITIONER

## 2020-02-19 RX ORDER — SODIUM CHLORIDE 0.9 % (FLUSH) 0.9 %
10 SYRINGE (ML) INJECTION AS NEEDED
Status: DISCONTINUED | OUTPATIENT
Start: 2020-02-19 | End: 2020-02-20 | Stop reason: HOSPADM

## 2020-02-19 RX ADMIN — SODIUM CHLORIDE 500 ML: 9 INJECTION, SOLUTION INTRAVENOUS at 19:58

## 2020-02-20 NOTE — ED PROVIDER NOTES
EMERGENCY DEPARTMENT ENCOUNTER    Room Number:  17/17  Date seen:  2/19/2020  Time seen: 7:49 PM  PCP: Charlie Arreguin MD  Historian: patient/EMS    HPI:  Chief complaint:chest pain, SOA    Context:Kwabena Le Jr. is a 74 y.o. male who presents to the ED with c/o acute onset chest pain described as tightness which seemed worse with breathing and shortness of breath while outside doing some light yard work. EMS was called and noted pt to be in SVT,  that did not respond to vagal maneuvers.  He received 6, then 12 mg Adenosine which converted him to SR. He states at this time CP and SOA are gone.  He is feeling much better.  States he has not missed any doses of his routine meds.  He received ASA and NTG per EMS as well.  States a few weeks ago he had similar symptoms but they resolved quickly. His Cardiologist is Dr. Kristine Zuluaga of [facility] [place] and he has h/o stents many years ago.       MEDICAL RECORD REVIEW      ALLERGIES  Patient has no known allergies.    PAST MEDICAL HISTORY  Active Ambulatory Problems     Diagnosis Date Noted   • Acute gastrointestinal hemorrhage 08/10/2018   • Perez's esophagus with low grade dysplasia 08/24/2018   • Perez's esophagus with high grade dysplasia 09/18/2018     Resolved Ambulatory Problems     Diagnosis Date Noted   • No Resolved Ambulatory Problems     Past Medical History:   Diagnosis Date   • Arthritis    • Perze esophagus    • Colon polyp    • Diabetes mellitus (CMS/HCC)    • History of transfusion    • Hypertension    • Myocardial infarction (CMS/HCC)        PAST SURGICAL HISTORY  Past Surgical History:   Procedure Laterality Date   • APPENDECTOMY     • BIOPSY PENILE      couple years ago was benign of prostate   • CARDIAC CATHETERIZATION      hAD FOUR STENTS   • CARDIAC SURGERY     • COLONOSCOPY      10 PLUS YEARS AGO   • COLONOSCOPY N/A 8/13/2018    Procedure: COLONOSCOPY and polypectomy;  Surgeon: Mili Iraheta MD;  Location: [facility];  Service:  Gastroenterology   • CYSTOSCOPY      2 years ago   • ENDOSCOPY N/A 8/11/2018    Procedure: ESOPHAGOGASTRODUODENOSCOPY w/ biopsies;  Surgeon: Mili Iraheta MD;  Location:  [facility];  Service: Gastroenterology   • ESOPHAGOSCOPY N/A 9/21/2018    Procedure: ESOPHAGOSCOPY WITH FOCAL RADIO FREQUENCY ABLATION;  Surgeon: Jean Rasmussen MD;  Location:  [facility];  Service: Gastroenterology   • TURP / TRANSURETHRAL INCISION / DRAINAGE PROSTATE      2 years ago       FAMILY HISTORY  Family History   Problem Relation Age of Onset   • Hypertension Mother    • Arthritis Mother    • Heart disease Father    • Heart disease Sister    • Hypertension Maternal Aunt    • Hypertension Maternal Uncle    • Heart disease Maternal Grandfather    • Colon cancer Neg Hx    • Colon polyps Neg Hx        SOCIAL HISTORY  Social History     Socioeconomic History   • Marital status:      Spouse name: Not on file   • Number of children: Not on file   • Years of education: Not on file   • Highest education level: Not on file   Tobacco Use   • Smoking status: Never Smoker   • Smokeless tobacco: Never Used   Substance and Sexual Activity   • Alcohol use: No   • Drug use: No   • Sexual activity: Defer     Partners: Male       REVIEW OF SYSTEMS  Review of Systems    All systems reviewed and negative except for those discussed in HPI.   PHYSICAL EXAM    ED Triage Vitals [02/1945]   Temp Heart Rate Resp BP SpO2   -- 87 16 145/83 94 %      Temp src Heart Rate Source Patient Position BP Location FiO2 (%)   -- Monitor Lying Right arm --     Physical Exam    I have reviewed the triage vital signs and nursing notes.      GENERAL: not distressed  HENT: nares patent, mm moist  EYES: no scleral icterus  NECK: no ROM limitations  CV: regular rhythm, regular rate, occasional PVC's noted on monitor  RESPIRATORY: normal effort, CTAB  ABDOMEN: soft  : deferred  MUSCULOSKELETAL: no deformity  NEURO: alert, moves all extremities, follows  commands  SKIN: warm, dry      PROGRESS, DATA ANALYSIS, CONSULTS AND MEDICAL DECISION MAKING  All labs have been independently reviewed by me.  All radiology studies have been reviewed by me and discussed with radiologist dictating the report.  EKG's independently viewed and interpreted by me unless stated otherwise. Discussion below represents my analysis of pertinent findings related to patient's condition, differential diagnosis, treatment plan and final disposition.   MDM: SVT resolved en route with EMS administration of adenosine.  Has had no recurrences.  He is on coreg and able to follow up with his Cardiologist tomorrow.   ED Course as of Feb 19 2319 Wed Feb 19, 2020 2017 EKG          EKG time: 1946  Rhythm/Rate:79, sinus rhythm with PVC's noted  P waves and VA: normal VA, normal THELMA  QRS, axis: normal QRS  ST and T waves: no acute ST or T wave changes    Interpreted Contemporaneously by me, independently viewed  No prior available for comparison    I did review the EMS EKG's which show the SVT  and conversion with Adenosine.       [EP]   2140 Updated patient on findings of workup.  He has an established Cardiologist, name and number in his phone which is at home.  I discussed labs/XR normal.  If he has return of the fast HR I have advised him to take additional dose of his Coreg.  He is to call and discuss this with his Cardiologist tomorrow.     [EP]      ED Course User Index  [EP] Beatrice Martin Katia, APRN       2032:Reviewed pt's history and workup with Dr. Cuevas.  After a bedside evaluation, Dr. Cuevas agrees with the plan of care.    2140: The patient's history, physical exam, and lab findings were discussed with the physician, who also performed a face to face history and physical exam.  I discussed all results and noted any abnormalities with patient.  Discussed absoute need to recheck abnormalities with their family physician.  I answered any of the patient's questions.  Discussed plan  "for discharge, as there is no emergent indication for admission.  Pt is agreeable and understands need for follow up and repeat testing.  Pt is aware that discharge does not mean that nothing is wrong but it indicates no emergency is present and they must continue care with their family physician.  Pt is discharged with instructions to follow up with primary care doctor to have their blood pressure rechecked.     Disposition vitals:  /75   Pulse 77   Temp 98 °F (36.7 °C) (Oral)   Resp 16   Ht 180.3 cm (71\")   Wt 97.5 kg (215 lb)   SpO2 96%   BMI 29.99 kg/m²       DIAGNOSIS  Final diagnoses:   SVT (supraventricular tachycardia) (CMS/HCC)       FOLLOW UP   Your Cardiologist, call tomorrow for urgent appointment               Beatrice Martin, APRN  02/19/20 7975    "

## 2020-02-20 NOTE — DISCHARGE INSTRUCTIONS
If you feel return of the SVT, then take an additional 12.5 mg Coreg.      It is important that you call your Cardiologist tomorrow for appointment    Return Precautions    Although you are being discharged from the ED today, I encourage you to return for worsening symptoms.  Things can, and do, change such that treatment at home with medication may not be adequate.      Specifically, return for any of the following:    Chest pain, shortness of breath, pain or nausea and vomiting not controlled by medications provided.    Please make a follow up with your Primary Care Provider for a blood pressure recheck.

## 2020-02-20 NOTE — ED PROVIDER NOTES
Pt presents to the ED c/o sudden onset chest tightness and SOA that began PTA while doing light yard work. When EMS arrived, EKG showed SVT with . Pt did not respond to vagal maneuvers so EMS gave 6 mg followed by 12 mg of Adenosine at which point pt converted to SR. He affirms currently being asymptomatic. Pt reports receiving aspirin and NTG en route to ED. He reports similar episode a few weeks ago that quickly resolved without medical intervention. He reports being followed by  (cardiology) and affirms compliance with home medications.      On exam, heart regular rate and rhythm, lungs CTAB, abdomen soft and non-tender, skin warm and dry with intact pulses      Plan: I agree with the plan to monitor pt in ED and discharge with cardiology follow up if cardiac testing is normal.      Attestation:  The [facility] and I have discussed this patient's history, physical exam, and treatment plan.  I have reviewed the documentation and personally had a face to face interaction with the patient. I affirm the documentation and agree with the treatment and plan.  The attached note describes my personal findings.       Documentation assistance provided by elisa Cho for . Information recorded by the elisa was done at my direction and has been verified and validated by me.       Carley Cho  02/19/20 2041       Domingo Cuevas MD  02/19/20 2115       Domingo Cuevas MD  02/19/20 2116

## 2020-08-04 ENCOUNTER — TRANSCRIBE ORDERS (OUTPATIENT)
Dept: ADMINISTRATIVE | Facility: HOSPITAL | Age: 75
End: 2020-08-04

## 2020-08-04 ENCOUNTER — HOSPITAL ENCOUNTER (OUTPATIENT)
Dept: GENERAL RADIOLOGY | Facility: HOSPITAL | Age: 75
Discharge: HOME | End: 2020-08-04
Admitting: INTERNAL MEDICINE

## 2020-08-04 DIAGNOSIS — R52 PAIN: ICD-10-CM

## 2020-08-04 DIAGNOSIS — R52 PAIN: Primary | ICD-10-CM

## 2020-08-04 PROCEDURE — 73502 X-RAY EXAM HIP UNI 2-3 VIEWS: CPT

## 2021-01-02 ENCOUNTER — HOSPITAL ENCOUNTER (EMERGENCY)
Facility: HOSPITAL | Age: 76
Discharge: HOME | End: 2021-01-02
Attending: EMERGENCY MEDICINE | Admitting: EMERGENCY MEDICINE

## 2021-01-02 ENCOUNTER — APPOINTMENT (OUTPATIENT)
Dept: GENERAL RADIOLOGY | Facility: HOSPITAL | Age: 76
End: 2021-01-02

## 2021-01-02 VITALS
OXYGEN SATURATION: 94 % | SYSTOLIC BLOOD PRESSURE: 107 MMHG | TEMPERATURE: 98.2 F | BODY MASS INDEX: 29.4 KG/M2 | DIASTOLIC BLOOD PRESSURE: 79 MMHG | RESPIRATION RATE: 17 BRPM | WEIGHT: 210 LBS | HEART RATE: 66 BPM | HEIGHT: 71 IN

## 2021-01-02 DIAGNOSIS — I47.1 SVT (SUPRAVENTRICULAR TACHYCARDIA) (HCC): Primary | ICD-10-CM

## 2021-01-02 DIAGNOSIS — U07.1 COVID-19: ICD-10-CM

## 2021-01-02 LAB
ALBUMIN SERPL-MCNC: 4.6 G/DL (ref 3.5–5.2)
ALBUMIN/GLOB SERPL: 1.3 G/DL
ALP SERPL-CCNC: 68 U/L (ref 39–117)
ALT SERPL W P-5'-P-CCNC: 39 U/L (ref 1–41)
ANION GAP SERPL CALCULATED.3IONS-SCNC: 12.9 MMOL/L (ref 5–15)
AST SERPL-CCNC: 24 U/L (ref 1–40)
BASOPHILS # BLD AUTO: 0.03 10*3/MM3 (ref 0–0.2)
BASOPHILS NFR BLD AUTO: 0.4 % (ref 0–1.5)
BILIRUB SERPL-MCNC: 0.6 MG/DL (ref 0–1.2)
BUN SERPL-MCNC: 25 MG/DL (ref 8–23)
BUN/CREAT SERPL: 18.5 (ref 7–25)
CALCIUM SPEC-SCNC: 9.7 MG/DL (ref 8.6–10.5)
CHLORIDE SERPL-SCNC: 95 MMOL/L (ref 98–107)
CO2 SERPL-SCNC: 24.1 MMOL/L (ref 22–29)
CREAT SERPL-MCNC: 1.35 MG/DL (ref 0.76–1.27)
D DIMER PPP FEU-MCNC: 0.3 MCGFEU/ML (ref 0–0.46)
D-LACTATE SERPL-SCNC: 2.7 MMOL/L (ref 0.5–2)
DEPRECATED RDW RBC AUTO: 46 FL (ref 37–54)
EOSINOPHIL # BLD AUTO: 0.05 10*3/MM3 (ref 0–0.4)
EOSINOPHIL NFR BLD AUTO: 0.6 % (ref 0.3–6.2)
ERYTHROCYTE [DISTWIDTH] IN BLOOD BY AUTOMATED COUNT: 13.7 % (ref 12.3–15.4)
FERRITIN SERPL-MCNC: 174 NG/ML (ref 30–400)
GFR SERPL CREATININE-BSD FRML MDRD: 52 ML/MIN/1.73
GLOBULIN UR ELPH-MCNC: 3.5 GM/DL
GLUCOSE SERPL-MCNC: 245 MG/DL (ref 65–99)
HCT VFR BLD AUTO: 56.1 % (ref 37.5–51)
HGB BLD-MCNC: 18 G/DL (ref 13–17.7)
IMM GRANULOCYTES # BLD AUTO: 0.02 10*3/MM3 (ref 0–0.05)
IMM GRANULOCYTES NFR BLD AUTO: 0.3 % (ref 0–0.5)
LACTATE HOLD SPECIMEN: NORMAL
LYMPHOCYTES # BLD AUTO: 3.41 10*3/MM3 (ref 0.7–3.1)
LYMPHOCYTES NFR BLD AUTO: 43.7 % (ref 19.6–45.3)
MCH RBC QN AUTO: 29.6 PG (ref 26.6–33)
MCHC RBC AUTO-ENTMCNC: 32.1 G/DL (ref 31.5–35.7)
MCV RBC AUTO: 92.3 FL (ref 79–97)
MONOCYTES # BLD AUTO: 0.85 10*3/MM3 (ref 0.1–0.9)
MONOCYTES NFR BLD AUTO: 10.9 % (ref 5–12)
NEUTROPHILS NFR BLD AUTO: 3.44 10*3/MM3 (ref 1.7–7)
NEUTROPHILS NFR BLD AUTO: 44.1 % (ref 42.7–76)
NRBC BLD AUTO-RTO: 0 /100 WBC (ref 0–0.2)
NT-PROBNP SERPL-MCNC: 58.3 PG/ML (ref 0–900)
PLATELET # BLD AUTO: 244 10*3/MM3 (ref 140–450)
PMV BLD AUTO: 10.9 FL (ref 6–12)
POTASSIUM SERPL-SCNC: 4.7 MMOL/L (ref 3.5–5.2)
PROCALCITONIN SERPL-MCNC: 0.08 NG/ML (ref 0–0.25)
PROT SERPL-MCNC: 8.1 G/DL (ref 6–8.5)
RBC # BLD AUTO: 6.08 10*6/MM3 (ref 4.14–5.8)
SODIUM SERPL-SCNC: 132 MMOL/L (ref 136–145)
TROPONIN T SERPL-MCNC: <0.01 NG/ML (ref 0–0.03)
TROPONIN T SERPL-MCNC: <0.01 NG/ML (ref 0–0.03)
WBC # BLD AUTO: 7.8 10*3/MM3 (ref 3.4–10.8)

## 2021-01-02 PROCEDURE — 93005 ELECTROCARDIOGRAM TRACING: CPT | Performed by: EMERGENCY MEDICINE

## 2021-01-02 PROCEDURE — 99283 EMERGENCY DEPT VISIT LOW MDM: CPT | Performed by: EMERGENCY MEDICINE

## 2021-01-02 PROCEDURE — 93010 ELECTROCARDIOGRAM REPORT: CPT | Performed by: INTERNAL MEDICINE

## 2021-01-02 PROCEDURE — 96374 THER/PROPH/DIAG INJ IV PUSH: CPT

## 2021-01-02 PROCEDURE — 83605 ASSAY OF LACTIC ACID: CPT | Performed by: EMERGENCY MEDICINE

## 2021-01-02 PROCEDURE — 99284 EMERGENCY DEPT VISIT MOD MDM: CPT

## 2021-01-02 PROCEDURE — 80053 COMPREHEN METABOLIC PANEL: CPT | Performed by: EMERGENCY MEDICINE

## 2021-01-02 PROCEDURE — 85025 COMPLETE CBC W/AUTO DIFF WBC: CPT | Performed by: EMERGENCY MEDICINE

## 2021-01-02 PROCEDURE — 96361 HYDRATE IV INFUSION ADD-ON: CPT

## 2021-01-02 PROCEDURE — 96375 TX/PRO/DX INJ NEW DRUG ADDON: CPT

## 2021-01-02 PROCEDURE — 84145 PROCALCITONIN (PCT): CPT | Performed by: EMERGENCY MEDICINE

## 2021-01-02 PROCEDURE — 71045 X-RAY EXAM CHEST 1 VIEW: CPT

## 2021-01-02 PROCEDURE — 84484 ASSAY OF TROPONIN QUANT: CPT | Performed by: EMERGENCY MEDICINE

## 2021-01-02 PROCEDURE — 85379 FIBRIN DEGRADATION QUANT: CPT | Performed by: EMERGENCY MEDICINE

## 2021-01-02 PROCEDURE — 25010000002 ADENOSINE PER 6 MG

## 2021-01-02 PROCEDURE — 82728 ASSAY OF FERRITIN: CPT | Performed by: EMERGENCY MEDICINE

## 2021-01-02 PROCEDURE — 83880 ASSAY OF NATRIURETIC PEPTIDE: CPT | Performed by: EMERGENCY MEDICINE

## 2021-01-02 RX ORDER — DILTIAZEM HYDROCHLORIDE 5 MG/ML
INJECTION INTRAVENOUS
Status: COMPLETED
Start: 2021-01-02 | End: 2021-01-02

## 2021-01-02 RX ORDER — SODIUM CHLORIDE 0.9 % (FLUSH) 0.9 %
10 SYRINGE (ML) INJECTION AS NEEDED
Status: DISCONTINUED | OUTPATIENT
Start: 2021-01-02 | End: 2021-01-02 | Stop reason: HOSPADM

## 2021-01-02 RX ORDER — ADENOSINE 3 MG/ML
6 INJECTION, SOLUTION INTRAVENOUS ONCE
Status: COMPLETED | OUTPATIENT
Start: 2021-01-02 | End: 2021-01-02

## 2021-01-02 RX ORDER — DILTIAZEM HYDROCHLORIDE 5 MG/ML
5 INJECTION INTRAVENOUS ONCE
Status: COMPLETED | OUTPATIENT
Start: 2021-01-02 | End: 2021-01-02

## 2021-01-02 RX ORDER — SODIUM CHLORIDE 9 MG/ML
INJECTION, SOLUTION INTRAVENOUS
Status: DISCONTINUED
Start: 2021-01-02 | End: 2021-01-02 | Stop reason: HOSPADM

## 2021-01-02 RX ORDER — ADENOSINE 3 MG/ML
INJECTION, SOLUTION INTRAVENOUS
Status: COMPLETED
Start: 2021-01-02 | End: 2021-01-02

## 2021-01-02 RX ORDER — DILTIAZEM HYDROCHLORIDE 100 MG/1
INJECTION, POWDER, LYOPHILIZED, FOR SOLUTION INTRAVENOUS
Status: DISCONTINUED
Start: 2021-01-02 | End: 2021-01-02 | Stop reason: HOSPADM

## 2021-01-02 RX ADMIN — DILTIAZEM HYDROCHLORIDE 5 MG: 5 INJECTION INTRAVENOUS at 16:42

## 2021-01-02 RX ADMIN — ADENOSINE 6 MG: 3 INJECTION, SOLUTION INTRAVENOUS at 17:10

## 2021-01-02 RX ADMIN — SODIUM CHLORIDE 1000 ML: 9 INJECTION, SOLUTION INTRAVENOUS at 16:40

## 2021-01-02 NOTE — ED NOTES
Patient brought to ED room 7 from vehicle with reports of shortness of breath. States diagnosed with COVID-19 recently & developed shortness of breath today. Patient placed on cardiac monitor, continuous pulse ox and NIBP. Heart rate 170's-180's. Oxygen saturation 94% on room air. Patient afebrile & blood pressure stable. Dr Pat called to bedside for patient evaluation.      Angela Mora, RN  01/02/21 1576

## 2021-01-02 NOTE — ED NOTES
Dr Pat at bedside for adenosine administration. Zoll monitor brought to bedside & attached to patient. 2 ED RNs and ED Tech at bedside. 6mg adenosine administered and patient's heart rate decreased. Repeat EKG obtained. Patient reports immediate improvement in symptoms. Vital signs stable. Will continue to monitor.      Angela Mora, RN  01/02/21 [id number]

## 2021-01-02 NOTE — ED PROVIDER NOTES
Subjective   History of Present Illness  History of Present Illness    Chief complaint: Shortness of breath, chest heaviness, recent COVID-19 diagnosis    Location: All across the chest    Quality/Severity: Moderate shortness of breath and chest heaviness    Timing/Duration: Started a couple of hours ago.    Modifying Factors: None    Narrative: This patient presents to us for evaluation of new onset shortness of breath and chest heaviness.  He was just recently diagnosed with COVID-19 a couple of days ago and has been quarantining at home.  He had a mild cough yesterday.  Today he was feeling relatively well throughout most of the day.  However a couple of days ago he began having this heaviness in his chest and the shortness of breath feeling.  Review of his records now shows that he has a history of SVT earlier this year.  He also has a past medical history of cardiac stents.  He follows with Dr. Pitts, cardiologist in the [facility] system.    Associated Symptoms: As above    Review of Systems   Constitutional: Positive for fatigue. Negative for fever.   HENT: Negative.    Eyes: Negative for pain and visual disturbance.   Respiratory: Positive for cough and shortness of breath.    Cardiovascular: Positive for chest pain.   Gastrointestinal: Negative for abdominal pain, diarrhea and vomiting.   Genitourinary: Negative for dysuria and flank pain.   Skin: Negative for color change and rash.   Neurological: Negative for syncope and headaches.   All other systems reviewed and are negative.      Past Medical History:   Diagnosis Date   • Arthritis    • Perez esophagus    • Colon polyp    • Diabetes mellitus (CMS/HCC)     pt states pre diaabetic   • History of transfusion    • Hyperlipidemia    • Hypertension    • Myocardial infarction (CMS/HCC)        No Known Allergies    Past Surgical History:   Procedure Laterality Date   • APPENDECTOMY     • BIOPSY PENILE      couple years ago was benign of prostate    • CARDIAC CATHETERIZATION      hAD FOUR STENTS   • CARDIAC SURGERY     • COLONOSCOPY      10 PLUS YEARS AGO   • COLONOSCOPY N/A 8/13/2018    Procedure: COLONOSCOPY and polypectomy;  Surgeon: Mili Iraheta MD;  Location: [facility] OR;  Service: Gastroenterology   • CYSTOSCOPY      2 years ago   • ENDOSCOPY N/A 8/11/2018    Procedure: ESOPHAGOGASTRODUODENOSCOPY w/ biopsies;  Surgeon: Mili Iraheta MD;  Location: [facility] OR;  Service: Gastroenterology   • ESOPHAGOSCOPY N/A 9/21/2018    Procedure: ESOPHAGOSCOPY WITH FOCAL RADIO FREQUENCY ABLATION;  Surgeon: Jean Rasmussen MD;  Location: [facility] ENDOSCOPY;  Service: Gastroenterology   • TURP / TRANSURETHRAL INCISION / DRAINAGE PROSTATE      2 years ago       Family History   Problem Relation Age of Onset   • Hypertension Mother    • Arthritis Mother    • Heart disease Father    • Heart disease Sister    • Hypertension Maternal Aunt    • Hypertension Maternal Uncle    • Heart disease Maternal Grandfather    • Colon cancer Neg Hx    • Colon polyps Neg Hx        Social History     Socioeconomic History   • Marital status:      Spouse name: Not on file   • Number of children: Not on file   • Years of education: Not on file   • Highest education level: Not on file   Tobacco Use   • Smoking status: Never Smoker   • Smokeless tobacco: Never Used   Substance and Sexual Activity   • Alcohol use: No   • Drug use: No   • Sexual activity: Defer     Partners: Male     ED Triage Vitals [01/02/21 1641]   Temp Heart Rate Resp BP SpO2   98.2 °F (36.8 °C) (!) 179 18 122/96 94 %      Temp src Heart Rate Source Patient Position BP Location FiO2 (%)   Oral Monitor Lying Right arm --     Objective   Physical Exam  Vitals signs and nursing note reviewed.   Constitutional:       Appearance: He is well-developed and normal weight. He is not diaphoretic.   HENT:      Head: Normocephalic and atraumatic.   Eyes:      General:         Right eye: No discharge.         Left eye: No  discharge.      Pupils: Pupils are equal, round, and reactive to light.   Neck:      Musculoskeletal: Normal range of motion and neck supple.   Cardiovascular:      Rate and Rhythm: Regular rhythm. Tachycardia present.      Pulses: Normal pulses.      Heart sounds: No murmur.      Comments: Regular, tachycardic rate in the 160-170 range.  Pulmonary:      Effort: Pulmonary effort is normal. No tachypnea, accessory muscle usage or respiratory distress.      Breath sounds: No stridor.   Chest:      Chest wall: No mass.   Musculoskeletal: Normal range of motion.         General: No deformity.      Right lower leg: No edema.      Left lower leg: No edema.   Skin:     General: Skin is warm and dry.      Coloration: Skin is not cyanotic or pale.      Findings: No erythema or rash.   Neurological:      General: No focal deficit present.      Mental Status: He is alert. He is disoriented.   Psychiatric:         Mood and Affect: Mood is anxious.         Behavior: Behavior normal.         Thought Content: Thought content normal.         Judgment: Judgment normal.       EKG           EKG time/Interp time: 1638/1638  Rhythm/Rate: SVT, 176 bpm  P waves and NC: P waves present, 69 ms  QRS, axis: 97 ms, borderline left axis deviation  ST and T waves: Rate related ST depression evident    Independently interpreted by me contemporaneously with treatment    EKG           EKG time/Interp time: 1715/1720  Rhythm/Rate: Sinus rhythm, 77 bpm  P waves and NC: P waves are present, 183 ms  QRS, axis: 100 ms, normal axis  ST and T waves: No acute appearing ischemic changes    Independently interpreted by me contemporaneously with treatment    Results for orders placed or performed during the hospital encounter of 01/02/21   Comprehensive Metabolic Panel    Specimen: Blood   Result Value Ref Range    Glucose 245 (H) 65 - 99 mg/dL    BUN 25 (H) 8 - 23 mg/dL    Creatinine 1.35 (H) 0.76 - 1.27 mg/dL    Sodium 132 (L) 136 - 145 mmol/L    Potassium  4.7 3.5 - 5.2 mmol/L    Chloride 95 (L) 98 - 107 mmol/L    CO2 24.1 22.0 - 29.0 mmol/L    Calcium 9.7 8.6 - 10.5 mg/dL    Total Protein 8.1 6.0 - 8.5 g/dL    Albumin 4.60 3.50 - 5.20 g/dL    ALT (SGPT) 39 1 - 41 U/L    AST (SGOT) 24 1 - 40 U/L    Alkaline Phosphatase 68 39 - 117 U/L    Total Bilirubin 0.6 0.0 - 1.2 mg/dL    eGFR Non African Amer 52 (L) >60 mL/min/1.73    Globulin 3.5 gm/dL    A/G Ratio 1.3 g/dL    BUN/Creatinine Ratio 18.5 7.0 - 25.0    Anion Gap 12.9 5.0 - 15.0 mmol/L   Procalcitonin    Specimen: Blood   Result Value Ref Range    Procalcitonin 0.08 0.00 - 0.25 ng/mL   Lactic Acid, Plasma    Specimen: Blood   Result Value Ref Range    Lactate 2.7 (C) 0.5 - 2.0 mmol/L   Troponin    Specimen: Blood   Result Value Ref Range    Troponin T <0.010 0.000 - 0.030 ng/mL   BNP    Specimen: Blood   Result Value Ref Range    proBNP 58.3 0.0 - 900.0 pg/mL   Ferritin    Specimen: Blood   Result Value Ref Range    Ferritin 174.00 30.00 - 400.00 ng/mL   D-dimer, Quantitative    Specimen: Blood   Result Value Ref Range    D-Dimer, Quantitative 0.30 0.00 - 0.46 MCGFEU/mL   CBC Auto Differential    Specimen: Blood   Result Value Ref Range    WBC 7.80 3.40 - 10.80 10*3/mm3    RBC 6.08 (H) 4.14 - 5.80 10*6/mm3    Hemoglobin 18.0 (H) 13.0 - 17.7 g/dL    Hematocrit 56.1 (H) 37.5 - 51.0 %    MCV 92.3 79.0 - 97.0 fL    MCH 29.6 26.6 - 33.0 pg    MCHC 32.1 31.5 - 35.7 g/dL    RDW 13.7 12.3 - 15.4 %    RDW-SD 46.0 37.0 - 54.0 fl    MPV 10.9 6.0 - 12.0 fL    Platelets 244 140 - 450 10*3/mm3    Neutrophil % 44.1 42.7 - 76.0 %    Lymphocyte % 43.7 19.6 - 45.3 %    Monocyte % 10.9 5.0 - 12.0 %    Eosinophil % 0.6 0.3 - 6.2 %    Basophil % 0.4 0.0 - 1.5 %    Immature Grans % 0.3 0.0 - 0.5 %    Neutrophils, Absolute 3.44 1.70 - 7.00 10*3/mm3    Lymphocytes, Absolute 3.41 (H) 0.70 - 3.10 10*3/mm3    Monocytes, Absolute 0.85 0.10 - 0.90 10*3/mm3    Eosinophils, Absolute 0.05 0.00 - 0.40 10*3/mm3    Basophils, Absolute 0.03 0.00 -  "0.20 10*3/mm3    Immature Grans, Absolute 0.02 0.00 - 0.05 10*3/mm3    nRBC 0.0 0.0 - 0.2 /100 WBC   Troponin    Specimen: Blood   Result Value Ref Range    Troponin T <0.010 0.000 - 0.030 ng/mL   ECG 12 Lead   Result Value Ref Range    QT Interval 286 ms   ECG 12 Lead   Result Value Ref Range    QT Interval 363 ms     RADIOLOGY        Study: Chest x-ray    Findings: Minimal atelectasis in the left lung base    Interpreted contemporaneously with treatment by Dr. Trujillo, independently viewed by me    Procedures           ED Course  ED Course as of Jan 02 2013   Sat Jan 02, 2021 2011 I reviewed the EKGs and labs and x-ray from today's visit.  Patient presented with a very tachycardic rhythm here.  At first I thought it might be atrial fibrillation with RVR since he presented in the context of recent COVID-19 diagnosis.  However, further investigation of his records showed that he has had previous SVT that responded well to adenosine.  So at that point, I stop my initial therapy of diltiazem and we gave him 1 dose of adenosine 6 mg.  This did convert him to a nice controlled sinus rhythm very well.  He has remained in a rate controlled sinus rhythm for several hours now and feels much better.  He has no chest tightness or heaviness.  He has no shortness of breath.  His second troponin is negative.  I think he is safe to discharge home at this time and continue symptomatic care for his COVID-19 diagnosis.  I will advise him to follow-up with his cardiologist for further evaluation of these SVT episodes he is now had at least 2 separate times.  I reviewed with him the usual \"return to ER\" instructions for any worsening signs or symptoms prior to his discharge.    [CHARLES]      ED Course User Index  [CHARLES] Tenzin Pat MD                                           MDM  Number of Diagnoses or Management Options     Amount and/or Complexity of Data Reviewed  Clinical lab tests: reviewed and ordered  Tests in the radiology " section of CPT®: ordered and reviewed  Tests in the medicine section of CPT®: reviewed  Decide to obtain previous medical records or to obtain history from someone other than the patient: yes  Review and summarize past medical records: yes  Independent visualization of images, tracings, or specimens: yes    Risk of Complications, Morbidity, and/or Mortality  Presenting problems: moderate  Diagnostic procedures: moderate  Management options: moderate        Final diagnoses:   SVT (supraventricular tachycardia) (CMS/[facility])   COVID-19            Tenzin Pat MD  01/02/21 2013

## 2021-01-03 LAB
QT INTERVAL: 286 MS
QT INTERVAL: 363 MS

## 2021-01-03 NOTE — ED NOTES
Patient calling his ride at this time. Will prepare patient for discharge.     Angela Moar RN  01/02/21 2015

## 2021-01-03 NOTE — DISCHARGE INSTRUCTIONS
Rest as needed and drink plenty of fluids.  Treat fevers with Tylenol or ibuprofen every 8 hours as needed.  Please return to the emergency room for any worsening pain, weakness, dizziness, difficulty breathing or any other concerns.

## 2021-05-04 ENCOUNTER — OFFICE VISIT (OUTPATIENT)
Dept: CARDIOLOGY | Facility: CLINIC | Age: 76
End: 2021-05-04

## 2021-05-04 VITALS
HEIGHT: 71 IN | SYSTOLIC BLOOD PRESSURE: 158 MMHG | DIASTOLIC BLOOD PRESSURE: 82 MMHG | HEART RATE: 55 BPM | WEIGHT: 210.6 LBS | BODY MASS INDEX: 29.48 KG/M2

## 2021-05-04 DIAGNOSIS — I10 ESSENTIAL HYPERTENSION: ICD-10-CM

## 2021-05-04 DIAGNOSIS — E78.2 MIXED HYPERLIPIDEMIA: ICD-10-CM

## 2021-05-04 DIAGNOSIS — I47.1 PSVT (PAROXYSMAL SUPRAVENTRICULAR TACHYCARDIA) (HCC): ICD-10-CM

## 2021-05-04 DIAGNOSIS — I25.10 CORONARY ARTERY DISEASE INVOLVING NATIVE CORONARY ARTERY OF NATIVE HEART WITHOUT ANGINA PECTORIS: Primary | ICD-10-CM

## 2021-05-04 DIAGNOSIS — E11.59 TYPE 2 DIABETES MELLITUS WITH OTHER CIRCULATORY COMPLICATION, WITHOUT LONG-TERM CURRENT USE OF INSULIN (HCC): ICD-10-CM

## 2021-05-04 DIAGNOSIS — N18.31 STAGE 3A CHRONIC KIDNEY DISEASE (HCC): ICD-10-CM

## 2021-05-04 PROBLEM — N18.30 CKD (CHRONIC KIDNEY DISEASE) STAGE 3, GFR 30-59 ML/MIN (HCC): Status: ACTIVE | Noted: 2021-05-04

## 2021-05-04 PROCEDURE — 93000 ELECTROCARDIOGRAM COMPLETE: CPT | Performed by: INTERNAL MEDICINE

## 2021-05-04 PROCEDURE — 99204 OFFICE O/P NEW MOD 45 MIN: CPT | Performed by: INTERNAL MEDICINE

## 2021-05-04 RX ORDER — NITROGLYCERIN 0.4 MG/1
0.4 TABLET SUBLINGUAL
Qty: 30 TABLET | Refills: 1 | Status: ON HOLD | OUTPATIENT
Start: 2021-05-04 | End: 2021-07-08 | Stop reason: SDUPTHER

## 2021-05-04 RX ORDER — LISINOPRIL 10 MG/1
10 TABLET ORAL DAILY
Status: ON HOLD | COMMUNITY
End: 2021-07-08 | Stop reason: SDUPTHER

## 2021-05-04 RX ORDER — CARVEDILOL 25 MG/1
25 TABLET ORAL 2 TIMES DAILY WITH MEALS
COMMUNITY

## 2021-05-04 NOTE — PROGRESS NOTES
Date of Office Visit: 21  Encounter Provider: Enrike Haney MD  Place of Service: [facility] CARDIOLOGY  Patient Name: Kwabena Le Jr.  :1945    Chief Complaint   Patient presents with   • Rapid Heart Rate   :     HPI:     Mr. Le is 76 y.o. and presents today to establish care.  He previously followed with a different cardiologist.  I have reviewed numerous records in our chart as well as Care Everywhere.    He suffered a non-ST elevation myocardial infarction in . He had classic anginal symptoms (heavy chest pressure, bilateral arm discomfort, dyspnea, diaphoresis). I do not have the actual cardiac cath report; I have a synopsis of it. Thankfully, he still has his stent cards.  It states that the culprit vessel was the second obtuse marginal, which received two drug-eluting stents (Cypher 2.5x28 and 2.6x13mm).  He also underwent drug-eluting stent placement to the first obtuse marginal ( MX2 3.5x24mm) and the first diagonal ( MX2 3.5x18mm) at that time.  It stated that he had apical LAD disease which was treated medically.  He had a normal perfusion stress test in 2020.    He has a history of SVT.  He presented to the ED in 2020 with chest tightness and a rapid heart rate.  I do not have any tracings of the tachycardia, but his heart rate was reported to be 190 bpm.  He was given adenosine by EMS and converted to sinus.  He presented again in 2021 with the same.  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 Of note, the second episode occurred right after he had a relatively mild case of COVID-19.    He feels well.  He denies chest pain, dyspnea, palpitations, lightheadedness, syncope, edema, or orthopnea. He checks his BP at home, and his average is ~ 140/85 mm Hg.    Past Medical History:   Diagnosis Date   • Acute gastrointestinal hemorrhage 8/10/2018   • Perez's esophagus with high grade  dysplasia 9/18/2018    Added automatically from request for surgery [id number]   • BPH (benign prostatic hyperplasia)    • CAD (coronary artery disease)    • CKD (chronic kidney disease) stage 3, GFR 30-59 ml/min (CMS/[facility]) 5/4/2021   • Colon polyp    • GERD (gastroesophageal reflux disease)    • History of transfusion    • Hyperlipidemia    • Hypertension    • Osteoarthritis    • PSVT (paroxysmal supraventricular tachycardia) (CMS/[facility])    • Type 2 diabetes mellitus (CMS/[facility])        Past Surgical History:   Procedure Laterality Date   • APPENDECTOMY     • BIOPSY PENILE      couple years ago was benign of prostate   • CARDIAC CATHETERIZATION      hAD FOUR STENTS   • CARDIAC SURGERY     • COLONOSCOPY      10 PLUS YEARS AGO   • COLONOSCOPY N/A 8/13/2018    Procedure: COLONOSCOPY and polypectomy;  Surgeon: Mili Iraheta MD;  Location: [facility] OR;  Service: Gastroenterology   • CYSTOSCOPY      2 years ago   • ENDOSCOPY N/A 8/11/2018    Procedure: ESOPHAGOGASTRODUODENOSCOPY w/ biopsies;  Surgeon: Mili Iraheta MD;  Location: [facility] OR;  Service: Gastroenterology   • ESOPHAGOSCOPY N/A 9/21/2018    Procedure: ESOPHAGOSCOPY WITH FOCAL RADIO FREQUENCY ABLATION;  Surgeon: Jean Rasmussen MD;  Location: [facility] ENDOSCOPY;  Service: Gastroenterology   • TURP / TRANSURETHRAL INCISION / DRAINAGE PROSTATE      2 years ago       Social History     Socioeconomic History   • Marital status:      Spouse name: Not on file   • Number of children: 2   • Years of education: Not on file   • Highest education level: Not on file   Tobacco Use   • Smoking status: Never Smoker   • Smokeless tobacco: Never Used   Vaping Use   • Vaping Use: Never used   Substance and Sexual Activity   • Alcohol use: No   • Drug use: No   • Sexual activity: Defer     Partners: Male       Family History   Problem Relation Age of Onset   • Hypertension Mother    • Arthritis Mother    • Heart disease Father    • Heart disease Sister    • Hypertension  "Maternal Aunt    • Hypertension Maternal Uncle    • Heart disease Maternal Grandfather    • Colon cancer Neg Hx    • Colon polyps Neg Hx        Review of Systems   All other systems reviewed and are negative.      No Known Allergies      Current Outpatient Medications:   •  allopurinol (ZYLOPRIM) 300 MG tablet, allopurinol 300 mg tablet  TAKE 1 TABLET DAILY, Disp: , Rfl:   •  ASPIRIN 81 PO, aspirin 81 mg tablet,delayed release  Take 1 tablet every day by oral route., Disp: , Rfl:   •  atorvastatin (LIPITOR) 40 MG tablet, atorvastatin 40 mg tablet  TAKE 1 TABLET DAILY IN THE EVENING, Disp: , Rfl:   •  carvedilol (COREG) 25 MG tablet, Take 25 mg by mouth 2 (Two) Times a Day With Meals., Disp: , Rfl:   •  dapagliflozin (Farxiga) 5 MG tablet tablet, Farxiga 5 mg tablet  Take 1 tablet every day by oral route for 30 days., Disp: , Rfl:   •  finasteride (PROSCAR) 5 MG tablet, finasteride 5 mg tablet, Disp: , Rfl:   •  lisinopril (PRINIVIL,ZESTRIL) 10 MG tablet, Take 10 mg by mouth Daily., Disp: , Rfl:   •  nitroglycerin (NITROSTAT) 0.4 MG SL tablet, Place 1 tablet under the tongue Every 5 (Five) Minutes As Needed for Chest Pain. Take no more than 3 doses in 15 minutes., Disp: 30 tablet, Rfl: 1  •  omeprazole (priLOSEC) 40 MG capsule, omeprazole 40 mg capsule,delayed release  TAKE 1 CAPSULE BY MOUTH EVERY DAY, Disp: , Rfl:   •  SITagliptin-metFORMIN HCl ER (Janumet XR)  MG tablet, 1 tablet 2 (two) times a day., Disp: , Rfl:   •  gabapentin (NEURONTIN) 300 MG capsule, Every 8 (Eight) Hours., Disp: , Rfl:       Objective:     Vitals:    05/04/21 1104   BP: 158/82   BP Location: Left arm   Pulse: 55   Weight: 95.5 kg (210 lb 9.6 oz)   Height: 180.3 cm (71\")     Body mass index is 29.37 kg/m².    Vitals reviewed.   Constitutional:       Appearance: Healthy appearance. Well-developed and not in distress.   Eyes:      Conjunctiva/sclera: Conjunctivae normal.   HENT:      Head: Normocephalic.      Nose: Nose normal.         " Comments: Masked  Neck:      Vascular: No JVD. JVD normal.      Lymphadenopathy: No cervical adenopathy.   Pulmonary:      Effort: Pulmonary effort is normal.      Breath sounds: Normal breath sounds.   Cardiovascular:      Normal rate. Regular rhythm.      Murmurs: There is no murmur.   Pulses:     Intact distal pulses.   Abdominal:      Palpations: Abdomen is soft.      Tenderness: There is no abdominal tenderness.   Musculoskeletal: Normal range of motion.      Cervical back: Normal range of motion. Skin:     General: Skin is warm and dry.      Findings: No rash.   Neurological:      General: No focal deficit present.      Mental Status: Alert, oriented to person, place, and time and oriented to person, place and time.      Cranial Nerves: No cranial nerve deficit.   Psychiatric:         Behavior: Behavior normal.         Thought Content: Thought content normal.         Judgment: Judgment normal.           ECG 12 Lead    Date/Time: 5/4/2021 11:16 AM  Performed by: Enrike Haney MD  Authorized by: Enrike Haney MD   Comparison: compared with previous ECG   Similar to previous ECG  Rhythm: sinus rhythm  Conduction: conduction normal  ST Segments: ST segments normal  T Waves: T waves normal  QRS axis: normal  Other: no other findings    Clinical impression: normal ECG            Assessment:       Diagnosis Plan   1. Coronary artery disease involving native coronary artery of native heart without angina pectoris  ECG 12 Lead   2. Mixed hyperlipidemia     3. PSVT (paroxysmal supraventricular tachycardia) (CMS/[facility])     4. Essential hypertension     5. Stage 3a chronic kidney disease (CMS/[facility])     6. Type 2 diabetes mellitus with other circulatory complication, without long-term current use of insulin (CMS/[facility])          Plan:       1/2. Coronary Artery Disease  Assessment  • The patient has no angina    Subjective - Objective  • There has been a previous stent procedure using KASSY 2007  • Current antiplatelet therapy  includes aspirin 81 mg    He had a normal perfusion stress test in March 2020. He is on atorvastatin and Dr Arreguin follows his lipids.  I refilled his SLNTG.    3.  He has a history of PSVT, likely AVNRT.  He's had two episodes in the last 1.5 years. I gave him education on Valsalva maneuvers today.  If he has recurrences while on high dose carvedilol, we may have to consider EPS/ablation.    4/5/6.  His BP was too high upon arrival, and was still too high upon recheck. He states it averages 140/85 at home. This is too high for a diabetic. I am going to have him bring his cuff in and validate it. I may have to add amlodipine; I'm reticent to add a diuretic or increase lisinopril given his CKD (GFR ~ 55, SCr ~ 1.35).      Sincerely,       Enrike Haney MD

## 2021-07-06 ENCOUNTER — APPOINTMENT (OUTPATIENT)
Dept: GENERAL RADIOLOGY | Facility: HOSPITAL | Age: 76
End: 2021-07-06

## 2021-07-06 ENCOUNTER — HOSPITAL ENCOUNTER (OUTPATIENT)
Facility: HOSPITAL | Age: 76
Setting detail: OBSERVATION
Discharge: ANOTHER HOSPITAL | End: 2021-07-07
Attending: EMERGENCY MEDICINE | Admitting: HOSPITALIST

## 2021-07-06 DIAGNOSIS — R00.2 PALPITATIONS: ICD-10-CM

## 2021-07-06 DIAGNOSIS — R07.9 CHEST PAIN, UNSPECIFIED TYPE: Primary | ICD-10-CM

## 2021-07-06 LAB
ALBUMIN SERPL-MCNC: 4.5 G/DL (ref 3.5–5.2)
ALBUMIN/GLOB SERPL: 2 G/DL
ALP SERPL-CCNC: 63 U/L (ref 39–117)
ALT SERPL W P-5'-P-CCNC: 16 U/L (ref 1–41)
ANION GAP SERPL CALCULATED.3IONS-SCNC: 10.8 MMOL/L (ref 5–15)
AST SERPL-CCNC: 16 U/L (ref 1–40)
BASOPHILS # BLD AUTO: 0.03 10*3/MM3 (ref 0–0.2)
BASOPHILS NFR BLD AUTO: 0.3 % (ref 0–1.5)
BILIRUB SERPL-MCNC: 0.4 MG/DL (ref 0–1.2)
BUN SERPL-MCNC: 18 MG/DL (ref 8–23)
BUN/CREAT SERPL: 16.7 (ref 7–25)
CALCIUM SPEC-SCNC: 9.7 MG/DL (ref 8.6–10.5)
CHLORIDE SERPL-SCNC: 100 MMOL/L (ref 98–107)
CO2 SERPL-SCNC: 21.2 MMOL/L (ref 22–29)
CREAT SERPL-MCNC: 1.08 MG/DL (ref 0.76–1.27)
D DIMER PPP FEU-MCNC: <0.27 MCGFEU/ML (ref 0–0.46)
DEPRECATED RDW RBC AUTO: 48.3 FL (ref 37–54)
EOSINOPHIL # BLD AUTO: 0.1 10*3/MM3 (ref 0–0.4)
EOSINOPHIL NFR BLD AUTO: 1.2 % (ref 0.3–6.2)
ERYTHROCYTE [DISTWIDTH] IN BLOOD BY AUTOMATED COUNT: 14 % (ref 12.3–15.4)
GFR SERPL CREATININE-BSD FRML MDRD: 67 ML/MIN/1.73
GLOBULIN UR ELPH-MCNC: 2.2 GM/DL
GLUCOSE SERPL-MCNC: 198 MG/DL (ref 65–99)
HBA1C MFR BLD: 7.6 % (ref 4.8–5.6)
HCT VFR BLD AUTO: 49.7 % (ref 37.5–51)
HGB BLD-MCNC: 16.1 G/DL (ref 13–17.7)
IMM GRANULOCYTES # BLD AUTO: 0.03 10*3/MM3 (ref 0–0.05)
IMM GRANULOCYTES NFR BLD AUTO: 0.3 % (ref 0–0.5)
LYMPHOCYTES # BLD AUTO: 2.23 10*3/MM3 (ref 0.7–3.1)
LYMPHOCYTES NFR BLD AUTO: 25.7 % (ref 19.6–45.3)
MCH RBC QN AUTO: 30.5 PG (ref 26.6–33)
MCHC RBC AUTO-ENTMCNC: 32.4 G/DL (ref 31.5–35.7)
MCV RBC AUTO: 94.1 FL (ref 79–97)
MONOCYTES # BLD AUTO: 0.79 10*3/MM3 (ref 0.1–0.9)
MONOCYTES NFR BLD AUTO: 9.1 % (ref 5–12)
NEUTROPHILS NFR BLD AUTO: 5.51 10*3/MM3 (ref 1.7–7)
NEUTROPHILS NFR BLD AUTO: 63.4 % (ref 42.7–76)
NRBC BLD AUTO-RTO: 0 /100 WBC (ref 0–0.2)
PLATELET # BLD AUTO: 210 10*3/MM3 (ref 140–450)
PMV BLD AUTO: 10.8 FL (ref 6–12)
POTASSIUM SERPL-SCNC: 4.7 MMOL/L (ref 3.5–5.2)
PROT SERPL-MCNC: 6.7 G/DL (ref 6–8.5)
QT INTERVAL: 400 MS
RBC # BLD AUTO: 5.28 10*6/MM3 (ref 4.14–5.8)
SARS-COV-2 RNA PNL SPEC NAA+PROBE: NOT DETECTED
SODIUM SERPL-SCNC: 132 MMOL/L (ref 136–145)
TROPONIN T SERPL-MCNC: <0.01 NG/ML (ref 0–0.03)
TSH SERPL DL<=0.05 MIU/L-ACNC: 0.68 UIU/ML (ref 0.27–4.2)
WBC # BLD AUTO: 8.69 10*3/MM3 (ref 3.4–10.8)

## 2021-07-06 PROCEDURE — 99285 EMERGENCY DEPT VISIT HI MDM: CPT

## 2021-07-06 PROCEDURE — 25010000002 ENOXAPARIN PER 10 MG: Performed by: PHYSICIAN ASSISTANT

## 2021-07-06 PROCEDURE — G0378 HOSPITAL OBSERVATION PER HR: HCPCS

## 2021-07-06 PROCEDURE — 84484 ASSAY OF TROPONIN QUANT: CPT | Performed by: NURSE PRACTITIONER

## 2021-07-06 PROCEDURE — 99220 PR INITIAL OBSERVATION CARE/DAY 70 MINUTES: CPT | Performed by: NURSE PRACTITIONER

## 2021-07-06 PROCEDURE — 93005 ELECTROCARDIOGRAM TRACING: CPT | Performed by: EMERGENCY MEDICINE

## 2021-07-06 PROCEDURE — 85379 FIBRIN DEGRADATION QUANT: CPT | Performed by: PHYSICIAN ASSISTANT

## 2021-07-06 PROCEDURE — 84484 ASSAY OF TROPONIN QUANT: CPT | Performed by: PHYSICIAN ASSISTANT

## 2021-07-06 PROCEDURE — 87635 SARS-COV-2 COVID-19 AMP PRB: CPT | Performed by: NURSE PRACTITIONER

## 2021-07-06 PROCEDURE — 84443 ASSAY THYROID STIM HORMONE: CPT | Performed by: NURSE PRACTITIONER

## 2021-07-06 PROCEDURE — 96372 THER/PROPH/DIAG INJ SC/IM: CPT

## 2021-07-06 PROCEDURE — 80053 COMPREHEN METABOLIC PANEL: CPT | Performed by: PHYSICIAN ASSISTANT

## 2021-07-06 PROCEDURE — 93010 ELECTROCARDIOGRAM REPORT: CPT | Performed by: INTERNAL MEDICINE

## 2021-07-06 PROCEDURE — 85025 COMPLETE CBC W/AUTO DIFF WBC: CPT | Performed by: PHYSICIAN ASSISTANT

## 2021-07-06 PROCEDURE — 71045 X-RAY EXAM CHEST 1 VIEW: CPT

## 2021-07-06 PROCEDURE — 83036 HEMOGLOBIN GLYCOSYLATED A1C: CPT | Performed by: NURSE PRACTITIONER

## 2021-07-06 PROCEDURE — C9803 HOPD COVID-19 SPEC COLLECT: HCPCS

## 2021-07-06 RX ORDER — CHOLECALCIFEROL (VITAMIN D3) 125 MCG
5 CAPSULE ORAL NIGHTLY PRN
Status: DISCONTINUED | OUTPATIENT
Start: 2021-07-06 | End: 2021-07-07 | Stop reason: HOSPADM

## 2021-07-06 RX ORDER — ACETAMINOPHEN 325 MG/1
650 TABLET ORAL EVERY 4 HOURS PRN
Status: DISCONTINUED | OUTPATIENT
Start: 2021-07-06 | End: 2021-07-07 | Stop reason: HOSPADM

## 2021-07-06 RX ORDER — NITROGLYCERIN 0.4 MG/1
0.4 TABLET SUBLINGUAL
Status: DISCONTINUED | OUTPATIENT
Start: 2021-07-06 | End: 2021-07-07 | Stop reason: HOSPADM

## 2021-07-06 RX ORDER — ATORVASTATIN CALCIUM 40 MG/1
40 TABLET, FILM COATED ORAL DAILY
Status: DISCONTINUED | OUTPATIENT
Start: 2021-07-06 | End: 2021-07-07 | Stop reason: HOSPADM

## 2021-07-06 RX ORDER — SODIUM CHLORIDE 0.9 % (FLUSH) 0.9 %
10 SYRINGE (ML) INJECTION AS NEEDED
Status: DISCONTINUED | OUTPATIENT
Start: 2021-07-06 | End: 2021-07-07 | Stop reason: HOSPADM

## 2021-07-06 RX ORDER — NICOTINE POLACRILEX 4 MG
15 LOZENGE BUCCAL
Status: DISCONTINUED | OUTPATIENT
Start: 2021-07-06 | End: 2021-07-07 | Stop reason: HOSPADM

## 2021-07-06 RX ORDER — CARVEDILOL 12.5 MG/1
25 TABLET ORAL 2 TIMES DAILY WITH MEALS
Status: DISCONTINUED | OUTPATIENT
Start: 2021-07-06 | End: 2021-07-07 | Stop reason: HOSPADM

## 2021-07-06 RX ORDER — SODIUM CHLORIDE 0.9 % (FLUSH) 0.9 %
10 SYRINGE (ML) INJECTION EVERY 12 HOURS SCHEDULED
Status: DISCONTINUED | OUTPATIENT
Start: 2021-07-06 | End: 2021-07-07 | Stop reason: HOSPADM

## 2021-07-06 RX ORDER — SODIUM CHLORIDE 9 MG/ML
100 INJECTION, SOLUTION INTRAVENOUS CONTINUOUS
Status: DISCONTINUED | OUTPATIENT
Start: 2021-07-07 | End: 2021-07-07 | Stop reason: HOSPADM

## 2021-07-06 RX ORDER — ASPIRIN 81 MG/1
243 TABLET, CHEWABLE ORAL ONCE
Status: COMPLETED | OUTPATIENT
Start: 2021-07-06 | End: 2021-07-06

## 2021-07-06 RX ORDER — ALLOPURINOL 300 MG/1
300 TABLET ORAL DAILY
Status: DISCONTINUED | OUTPATIENT
Start: 2021-07-06 | End: 2021-07-07 | Stop reason: HOSPADM

## 2021-07-06 RX ORDER — ACETAMINOPHEN 650 MG/1
650 SUPPOSITORY RECTAL EVERY 4 HOURS PRN
Status: DISCONTINUED | OUTPATIENT
Start: 2021-07-06 | End: 2021-07-07 | Stop reason: HOSPADM

## 2021-07-06 RX ORDER — ACETAMINOPHEN 160 MG/5ML
650 SOLUTION ORAL EVERY 4 HOURS PRN
Status: DISCONTINUED | OUTPATIENT
Start: 2021-07-06 | End: 2021-07-07 | Stop reason: HOSPADM

## 2021-07-06 RX ORDER — PANTOPRAZOLE SODIUM 40 MG/1
40 TABLET, DELAYED RELEASE ORAL EVERY MORNING
Status: DISCONTINUED | OUTPATIENT
Start: 2021-07-07 | End: 2021-07-07 | Stop reason: HOSPADM

## 2021-07-06 RX ORDER — DEXTROSE MONOHYDRATE 25 G/50ML
25 INJECTION, SOLUTION INTRAVENOUS
Status: DISCONTINUED | OUTPATIENT
Start: 2021-07-06 | End: 2021-07-07 | Stop reason: HOSPADM

## 2021-07-06 RX ORDER — FINASTERIDE 5 MG/1
5 TABLET, FILM COATED ORAL DAILY
Status: DISCONTINUED | OUTPATIENT
Start: 2021-07-06 | End: 2021-07-06

## 2021-07-06 RX ORDER — LISINOPRIL 10 MG/1
10 TABLET ORAL DAILY
Status: DISCONTINUED | OUTPATIENT
Start: 2021-07-06 | End: 2021-07-07 | Stop reason: HOSPADM

## 2021-07-06 RX ORDER — ONDANSETRON 4 MG/1
4 TABLET, FILM COATED ORAL EVERY 6 HOURS PRN
Status: DISCONTINUED | OUTPATIENT
Start: 2021-07-06 | End: 2021-07-07 | Stop reason: HOSPADM

## 2021-07-06 RX ORDER — FINASTERIDE 5 MG/1
5 TABLET, FILM COATED ORAL DAILY
Status: DISCONTINUED | OUTPATIENT
Start: 2021-07-07 | End: 2021-07-07 | Stop reason: HOSPADM

## 2021-07-06 RX ORDER — ONDANSETRON 2 MG/ML
4 INJECTION INTRAMUSCULAR; INTRAVENOUS EVERY 6 HOURS PRN
Status: DISCONTINUED | OUTPATIENT
Start: 2021-07-06 | End: 2021-07-07 | Stop reason: HOSPADM

## 2021-07-06 RX ORDER — ASPIRIN 81 MG/1
81 TABLET ORAL DAILY
Status: DISCONTINUED | OUTPATIENT
Start: 2021-07-07 | End: 2021-07-07 | Stop reason: HOSPADM

## 2021-07-06 RX ORDER — SODIUM CHLORIDE 9 MG/ML
40 INJECTION, SOLUTION INTRAVENOUS AS NEEDED
Status: DISCONTINUED | OUTPATIENT
Start: 2021-07-06 | End: 2021-07-07 | Stop reason: HOSPADM

## 2021-07-06 RX ADMIN — ALLOPURINOL 300 MG: 300 TABLET ORAL at 20:44

## 2021-07-06 RX ADMIN — LISINOPRIL 10 MG: 10 TABLET ORAL at 20:44

## 2021-07-06 RX ADMIN — CARVEDILOL 25 MG: 12.5 TABLET, FILM COATED ORAL at 20:44

## 2021-07-06 RX ADMIN — ATORVASTATIN CALCIUM 40 MG: 40 TABLET, FILM COATED ORAL at 20:44

## 2021-07-06 RX ADMIN — NITROGLYCERIN 1 INCH: 20 OINTMENT TOPICAL at 17:17

## 2021-07-06 RX ADMIN — SODIUM CHLORIDE, PRESERVATIVE FREE 10 ML: 5 INJECTION INTRAVENOUS at 20:48

## 2021-07-06 RX ADMIN — ENOXAPARIN SODIUM 100 MG: 100 INJECTION SUBCUTANEOUS at 17:17

## 2021-07-06 RX ADMIN — ASPIRIN 243 MG: 81 TABLET, CHEWABLE ORAL at 17:17

## 2021-07-06 NOTE — PLAN OF CARE
Goal Outcome Evaluation:  Plan of Care Reviewed With: patient        Progress: no change  Outcome Summary: Admit to [place] from ED. [place] to room, call light, plan of care. Pt denies chest pain at this time. Heart rate noted in 50s. All questions answered at this time.

## 2021-07-06 NOTE — ED NOTES
Tried to call report to med/surg RN and she was busy in a patient's room and will call back     Whitney Mi RN  07/06/21 9818

## 2021-07-06 NOTE — ED PROVIDER NOTES
EMERGENCY DEPARTMENT ENCOUNTER      Room Number: 1403/1    History is provided by the patient, no translation services needed    HPI:    Chief complaint: Chest pain    Location: Left    Quality/Severity: Pressure    Timing/Duration: 4 hours/intermittent    Modifying Factors: Worse with exertion    Associated Symptoms: Palpitations, nausea, left arm pain, shortness of breath    Narrative: Pt is a 76 y.o. male who presents complaining of chest pain x4 hours.  Patient states the chest pain began this morning when he was relaxed.  Patient states that his chest pain is worse with exertion.  Patient describes the chest pain is pressure-like.  Patient is it radiates into his left arm.  Patient also complaining of nausea and shortness of breath with exertion.  Patient denies any shortness of breath or nausea at this time.  Patient is chest pain is a 2 out of 10 currently.  Patient is that he is a history of cardiac stents, SVT, MI.      PMD: Charlie Arreguin MD    REVIEW OF SYSTEMS  Review of Systems   Constitutional: Negative for chills and fever.   Eyes: Negative for pain and visual disturbance.   Respiratory: Positive for shortness of breath. Negative for cough.    Cardiovascular: Positive for chest pain and palpitations.   Gastrointestinal: Positive for nausea. Negative for abdominal pain and vomiting.   Genitourinary: Negative for difficulty urinating, dysuria and flank pain.   Musculoskeletal: Negative for gait problem and myalgias.   Skin: Negative for rash and wound.   Neurological: Negative for dizziness, syncope, numbness and headaches.   Psychiatric/Behavioral: Negative for confusion and suicidal ideas. The patient is not nervous/anxious.          PAST MEDICAL HISTORY  Active Ambulatory Problems     Diagnosis Date Noted   • Perez's esophagus with high grade dysplasia 09/18/2018   • CAD (coronary artery disease)    • Hyperlipidemia    • PSVT (paroxysmal supraventricular tachycardia) (CMS/HCC)    • Type 2  diabetes mellitus (CMS/[facility])    • Hypertension    • CKD (chronic kidney disease) stage 3, GFR 30-59 ml/min (CMS/[facility]) 05/04/2021     Resolved Ambulatory Problems     Diagnosis Date Noted   • No Resolved Ambulatory Problems     Past Medical History:   Diagnosis Date   • Acute gastrointestinal hemorrhage 8/10/2018   • BPH (benign prostatic hyperplasia)    • Colon polyp    • GERD (gastroesophageal reflux disease)    • History of transfusion    • Osteoarthritis        PAST SURGICAL HISTORY  Past Surgical History:   Procedure Laterality Date   • APPENDECTOMY     • BIOPSY PENILE      couple years ago was benign of prostate   • CARDIAC CATHETERIZATION      hAD FOUR STENTS   • CARDIAC SURGERY     • COLONOSCOPY      10 PLUS YEARS AGO   • COLONOSCOPY N/A 8/13/2018    Procedure: COLONOSCOPY and polypectomy;  Surgeon: Mili Iraheta MD;  Location: [facility] OR;  Service: Gastroenterology   • CYSTOSCOPY      2 years ago   • ENDOSCOPY N/A 8/11/2018    Procedure: ESOPHAGOGASTRODUODENOSCOPY w/ biopsies;  Surgeon: Mili Iraheta MD;  Location: [facility] OR;  Service: Gastroenterology   • ESOPHAGOSCOPY N/A 9/21/2018    Procedure: ESOPHAGOSCOPY WITH FOCAL RADIO FREQUENCY ABLATION;  Surgeon: Jean Rasmussen MD;  Location: [facility] ENDOSCOPY;  Service: Gastroenterology   • TURP / TRANSURETHRAL INCISION / DRAINAGE PROSTATE      2 years ago       FAMILY HISTORY  Family History   Problem Relation Age of Onset   • Hypertension Mother    • Arthritis Mother    • Heart disease Father    • Heart disease Sister    • Hypertension Maternal Aunt    • Hypertension Maternal Uncle    • Heart disease Maternal Grandfather    • Colon cancer Neg Hx    • Colon polyps Neg Hx        SOCIAL HISTORY  Social History     Socioeconomic History   • Marital status:      Spouse name: Not on file   • Number of children: 2   • Years of education: Not on file   • Highest education level: Not on file   Tobacco Use   • Smoking status: Never Smoker   • Smokeless  tobacco: Never Used   Vaping Use   • Vaping Use: Never used   Substance and Sexual Activity   • Alcohol use: No   • Drug use: No   • Sexual activity: Defer     Partners: Male       ALLERGIES  Patient has no known allergies.      Current Facility-Administered Medications:   •  acetaminophen (TYLENOL) tablet 650 mg, 650 mg, Oral, Q4H PRN **OR** acetaminophen (TYLENOL) 160 MG/5ML solution 650 mg, 650 mg, Oral, Q4H PRN **OR** acetaminophen (TYLENOL) suppository 650 mg, 650 mg, Rectal, Q4H PRN, Radha Flores, APRN  •  allopurinol (ZYLOPRIM) tablet 300 mg, 300 mg, Oral, Daily, Radha Flores, APRN, 300 mg at 07/06/21 2044  •  [START ON 7/7/2021] aspirin EC tablet 81 mg, 81 mg, Oral, Daily, Radha Flores, APRN  •  atorvastatin (LIPITOR) tablet 40 mg, 40 mg, Oral, Daily, Radha Flores, APRN, 40 mg at 07/06/21 2044  •  carvedilol (COREG) tablet 25 mg, 25 mg, Oral, BID With Meals, Radha Flores, APRN, 25 mg at 07/06/21 2044  •  dextrose (D50W) 25 g/ 50mL Intravenous Solution 25 g, 25 g, Intravenous, Q15 Min PRN, Radha Flores, APRN  •  dextrose (GLUTOSE) oral gel 15 g, 15 g, Oral, Q15 Min PRN, Radha Flores, APRN  •  [START ON 7/7/2021] enoxaparin (LOVENOX) syringe 40 mg, 40 mg, Subcutaneous, Q24H, Radha Flores, APRN  •  [START ON 7/7/2021] finasteride (PROSCAR) tablet 5 mg, 5 mg, Oral, Daily, Radha Flores, APRN  •  glucagon (GLUCAGEN) injection 1 mg, 1 mg, Subcutaneous, Q15 Min PRN, Radha Flores, APRN  •  [START ON 7/7/2021] insulin aspart (novoLOG) injection 0-7 Units, 0-7 Units, Subcutaneous, TID AC, Radha Flores APRN  •  lisinopril (PRINIVIL,ZESTRIL) tablet 10 mg, 10 mg, Oral, Daily, Radha Flores APRN, 10 mg at 07/06/21 2044  •  melatonin tablet 5 mg, 5 mg, Oral, Nightly PRN, Radha Flores APRN  •  nitroglycerin (NITROSTAT) SL tablet 0.4 mg, 0.4 mg, Sublingual, Q5 Min PRN, Radha Florse APRN  •  ondansetron (ZOFRAN) tablet 4 mg, 4 mg, Oral, Q6H  PRN **OR** ondansetron (ZOFRAN) injection 4 mg, 4 mg, Intravenous, Q6H PRN, Radha Flores APRMAR  •  [START ON 7/7/2021] pantoprazole (PROTONIX) EC tablet 40 mg, 40 mg, Oral, QAM, Radha Flores, APRN  •  Pharmacy to Dose enoxaparin (LOVENOX), , Does not apply, Continuous PRN, Radha Flores R, APRN  •  sodium chloride 0.9 % flush 10 mL, 10 mL, Intravenous, PRN, Radha Flores R, APRN  •  sodium chloride 0.9 % flush 10 mL, 10 mL, Intravenous, Q12H, Radha Flores APRN, 10 mL at 07/06/21 2048  •  sodium chloride 0.9 % infusion 40 mL, 40 mL, Intravenous, PRN, Radha Flores R, APRN  •  [START ON 7/7/2021] sodium chloride 0.9 % infusion, 100 mL/hr, Intravenous, Continuous, Radha Flores, APRN    PHYSICAL EXAM  ED Triage Vitals [07/06/21 1405]   Temp Heart Rate Resp BP SpO2   97.9 °F (36.6 °C) 64 20 (!) 182/94 98 %      Temp src Heart Rate Source Patient Position BP Location FiO2 (%)   Oral Monitor Sitting Right arm --       Physical Exam  Vitals and nursing note reviewed.   HENT:      Head: Normocephalic and atraumatic.   Eyes:      Conjunctiva/sclera: Conjunctivae normal.   Cardiovascular:      Rate and Rhythm: Normal rate and regular rhythm.      Heart sounds: Normal heart sounds.   Pulmonary:      Effort: Pulmonary effort is normal. No respiratory distress.      Breath sounds: Normal breath sounds.   Abdominal:      General: Bowel sounds are normal. There is no distension.      Palpations: Abdomen is soft.      Tenderness: There is no abdominal tenderness.   Musculoskeletal:         General: Normal range of motion.      Cervical back: Normal range of motion and neck supple.   Skin:     General: Skin is warm and dry.   Neurological:      Mental Status: He is alert and oriented to person, place, and time.   Psychiatric:         Mood and Affect: Mood and affect normal.           LAB RESULTS  Lab Results (last 24 hours)     Procedure Component Value Units Date/Time    CBC & Differential  [[id number]]  (Normal) Collected: 07/06/21 1412    Specimen: Blood Updated: 07/06/21 1422    Narrative:      The following orders were created for panel order CBC & Differential.  Procedure                               Abnormality         Status                     ---------                               -----------         ------                     CBC Auto Differential[[id number]]        Normal              Final result                 Please view results for these tests on the individual orders.    D-dimer, Quantitative [[id number]]  (Normal) Collected: 07/06/21 1412    Specimen: Blood Updated: 07/06/21 1437     D-Dimer, Quantitative <0.27 MCGFEU/mL     Narrative:      Can be elevated in, but is not diagnostic for deep vein thrombosis (DVT) or pulmonary embolis (PE).  It is also elevated in other medical conditions.  Clinical correlation is required.  The negative cut-off value for the D-Dimer is 0.50 mcg FEU/mL for DVT and PE.      CBC Auto Differential [[id number]]  (Normal) Collected: 07/06/21 1412    Specimen: Blood Updated: 07/06/21 1422     WBC 8.69 10*3/mm3      RBC 5.28 10*6/mm3      Hemoglobin 16.1 g/dL      Hematocrit 49.7 %      MCV 94.1 fL      MCH 30.5 pg      MCHC 32.4 g/dL      RDW 14.0 %      RDW-SD 48.3 fl      MPV 10.8 fL      Platelets 210 10*3/mm3      Neutrophil % 63.4 %      Lymphocyte % 25.7 %      Monocyte % 9.1 %      Eosinophil % 1.2 %      Basophil % 0.3 %      Immature Grans % 0.3 %      Neutrophils, Absolute 5.51 10*3/mm3      Lymphocytes, Absolute 2.23 10*3/mm3      Monocytes, Absolute 0.79 10*3/mm3      Eosinophils, Absolute 0.10 10*3/mm3      Basophils, Absolute 0.03 10*3/mm3      Immature Grans, Absolute 0.03 10*3/mm3      nRBC 0.0 /100 WBC     Hemoglobin A1c [[id number]]  (Abnormal) Collected: 07/06/21 1412    Specimen: Blood Updated: 07/06/21 1942     Hemoglobin A1C 7.60 %     Narrative:      Hemoglobin A1C Ranges:    Increased Risk for Diabetes  5.7% to 6.4%  Diabetes                      >= 6.5%  Diabetic Goal                < 7.0%    Comprehensive Metabolic Panel [[id number]]  (Abnormal) Collected: 07/06/21 1413    Specimen: Blood Updated: 07/06/21 1450     Glucose 198 mg/dL      BUN 18 mg/dL      Creatinine 1.08 mg/dL      Sodium 132 mmol/L      Potassium 4.7 mmol/L      Comment: Slight hemolysis detected by analyzer. Results may be affected.        Chloride 100 mmol/L      CO2 21.2 mmol/L      Calcium 9.7 mg/dL      Total Protein 6.7 g/dL      Albumin 4.50 g/dL      ALT (SGPT) 16 U/L      AST (SGOT) 16 U/L      Comment: Slight hemolysis detected by analyzer. Results may be affected.        Alkaline Phosphatase 63 U/L      Total Bilirubin 0.4 mg/dL      eGFR Non African Amer 67 mL/min/1.73      Globulin 2.2 gm/dL      A/G Ratio 2.0 g/dL      BUN/Creatinine Ratio 16.7     Anion Gap 10.8 mmol/L     Narrative:      GFR Normal >60  Chronic Kidney Disease <60  Kidney Failure <15      Troponin [[id number]]  (Normal) Collected: 07/06/21 1413    Specimen: Blood Updated: 07/06/21 1442     Troponin T <0.010 ng/mL     Narrative:      Troponin T Reference Range:  <= 0.03 ng/mL-   Negative for AMI  >0.03 ng/mL-     Abnormal for myocardial necrosis.  Clinicians would have to utilize clinical acumen, EKG, Troponin and serial changes to determine if it is an Acute Myocardial Infarction or myocardial injury due to an underlying chronic condition.       Results may be falsely decreased if patient taking Biotin.      Troponin [[id number]]  (Normal) Collected: 07/06/21 1723    Specimen: Blood Updated: 07/06/21 1756     Troponin T <0.010 ng/mL     Narrative:      Troponin T Reference Range:  <= 0.03 ng/mL-   Negative for AMI  >0.03 ng/mL-     Abnormal for myocardial necrosis.  Clinicians would have to utilize clinical acumen, EKG, Troponin and serial changes to determine if it is an Acute Myocardial Infarction or myocardial injury due to an underlying chronic condition.       Results may be falsely decreased if  patient taking Biotin.      TSH [[id number]]  (Normal) Collected: 07/06/21 1723    Specimen: Blood Updated: 07/06/21 1951     TSH 0.677 uIU/mL     COVID PRE-OP / PRE-PROCEDURE SCREENING ORDER (NO ISOLATION) - Swab, Nasal Cavity [[id number]]  (Normal) Collected: 07/06/21 2013    Specimen: Swab from Nasal Cavity Updated: 07/06/21 2102    Narrative:      The following orders were created for panel order COVID PRE-OP / PRE-PROCEDURE SCREENING ORDER (NO ISOLATION) - Swab, Nasal Cavity.  Procedure                               Abnormality         Status                     ---------                               -----------         ------                     COVID-19,[facility]...[[id number]]  Normal              Final result                 Please view results for these tests on the individual orders.    COVID-19,[facility]HOU[facility],Nasal Swab No Transport Media 3-4 HR TAT - Swab, Nasal Cavity [[id number]]  (Normal) Collected: 07/06/21 2013    Specimen: Swab from Nasal Cavity Updated: 07/06/21 2102     COVID19 Not Detected    Narrative:      Fact sheet for providers: https://www.fda.gov/media/804742/download     Fact sheet for patients: https://www.fda.gov/media/113177/download    Test performed by PCR.    Consider negative results in combination with clinical observations, patient history, and epidemiological information.            I ordered the above labs and reviewed the results    RADIOLOGY  XR Chest 1 View    Result Date: 7/6/2021  AP PORTABLE CHEST, 07/06/2021  HISTORY: Tightness and pain in chest starting earlier today  COMPARISON: 01/02/2021  TECHNIQUE: AP portable chest x-ray.  FINDINGS: Heart size and pulmonary vascularity are normal. The lungs are expanded and clear. No visible pulmonary infiltrate or pleural effusion.       Negative single view chest  This report was finalized on 7/6/2021 2:51 PM by Dr. Martínez Ayon MD.        I ordered the above radiologic testing and reviewed the  results    PROCEDURES  Procedures      PROGRESS AND CONSULTS  ED Course as of Jul 06 2146   Tue Jul 06, 2021   1416 EKG         EKG time / Interpretation time: 1406/1408  Rhythm/Rate: Sinus rhythm/59   CA: 208  QRS, axis: -2, left axis deviation  QTc 396  ST and T waves: No significant ST elevation or depression, no T wave abnormality  EKG Tracing Interpreted Contemporaneously by me, independently viewed by me and MD.  unchanged compared to prior 5/4/2021      [GT]   1713 Spoke with Dr. Silveira, cardiology and she recommends that patient be admitted to the hospitalist for further evaluation of his chest pain.  She request that patient have a repeat troponin done, an inch of Nitropaste placed, aspirin and 1 mg per kg of Lovenox.    [GT]   1721 Spoke with Dr. Gonzales, hospitalist and she is accepted the patient for admission.    [GT]   2138 76-year-old male with a history of MI and stents presents to the ED with complaints of chest pain that began at 10 AM this morning.  Patient states that it was a pressure-like pain radiating to his left arm.  Patient was complaining of shortness of breath and palpitations.  Patient is also complaining of nausea.  Patient states that  he had episodes of palpitations off and on since 10 AM.  Patient states that currently he has got no palpitations but has slight pressure in his chest.  Patient denies any nausea or shortness of breath at this time.  After history and physical exam, patient was noted to have a normal heart rate.  Patient had no chest pain with palpation of his chest wall.  Patient's troponin is negative.  Patient had a negative D-dimer.  Patient's glucose was elevated at 198.  Patient sodium was 132 and CO2 21.2.  Remainder of patient's labs were unremarkable.  Patient's chest x-ray showed no acute disease process.  Patient's EKG showed no acute ischemic event.  Cardiology was consulted and they recommend that patient be admitted to the hospital for further  evaluation.  She also requested that a repeat troponin and an inch of Nitropaste ,aspirin, and Lovenox be given.  Dr. Gonzales the hospitalist was consulted and she has accepted patient for admission.  Patient agrees to admission.  Patient is stable at time of admission.    [GT]      ED Course User Index  [GT] Jeannette Brunner PA-C           MEDICAL DECISION MAKING    MDM      HEART Score (for prediction of 6-week risk of major adverse cardiac event) reviewed and/or performed as part of the patient evaluation and treatment planning process.  The result associated with this review/performance is: 5      DIAGNOSIS  Final diagnoses:   Chest pain, unspecified type   Palpitations       Latest Documented Vital Signs:  As of 21:46 EDT  BP- 130/70 HR- 51 Temp- 96.9 °F (36.1 °C) (Oral) O2 sat- 93%    DISPOSITION  Admitted to [facility]        Discussed pertinent findings with the patient/family.  Patient/Family voiced understanding of need to follow-up for recheck and further testing as needed.  Return to the Emergency Department warnings were given.         Medication List      No changes were made to your prescriptions during this visit.                   Dictated utilizing Dragon dictation     Jeannette Brunner PA-C  07/06/21 7967

## 2021-07-06 NOTE — ED NOTES
4th attempt to get on call physician for cardiology.  They had to take all of the patients information again because they could not find our initial attempt     Valentine Zuluaga  07/06/21 2164

## 2021-07-06 NOTE — H&P
[facility] HOSPITALIST     Charlie Arreguin MD    CHIEF COMPLAINT: Chest pain    HISTORY OF PRESENT ILLNESS:  Mr. Le is a pleasant 76-year-old male that presented to the emergency department secondary to multiple episodes of midsternal chest pain radiating to both arms, mild nausea and shortness of air earlier today.  He notes 5-6 episodes lasting 15 minutes each prior to arrival in ER.  He notes another 5-6 episodes between 4 PM and 5 PM.  He reports pain improves slightly when he holds his breath and presses midsternal.  Pain otherwise resolves without intervention.  He reports similar symptoms when he had SVT in January and was sure that this was going on again however diagnostics in ER did not reveal this.  In the past he reports having required adenosine twice.  He also reports a history of MI with stents x4 approximately 13 years ago.    He reports has had Covid and vaccinations  He denies tobacco use, drinks occasional beer and denies illicit drug use    He has a known history of CAD, MI with stents, PSVT, DM2, HTN, HLD, GERD/Perez's esophagus with high-grade dysplasia, history of GI bleed, CKD stage III, BPH    He otherwise denies f/c/headache/rhinorrhea/nasal congestion/lightheadedness/syncopal sensation/cough/soa/v/d/abdominal pain/recent illness/sick exposures/change in bowel or bladder habits/no weight change/bloody emesis or bloody stools/change in medications or any other new concerns.    Past Medical History:   Diagnosis Date   • Acute gastrointestinal hemorrhage 8/10/2018   • Perez's esophagus with high grade dysplasia 9/18/2018    Added automatically from request for surgery [id number]   • BPH (benign prostatic hyperplasia)    • CAD (coronary artery disease)    • CKD (chronic kidney disease) stage 3, GFR 30-59 ml/min (CMS/[facility]) 5/4/2021   • Colon polyp    • GERD (gastroesophageal reflux disease)    • History of transfusion    • Hyperlipidemia    • Hypertension    •  Osteoarthritis    • PSVT (paroxysmal supraventricular tachycardia) (CMS/HCC)    • Type 2 diabetes mellitus (CMS/HCC)      Past Surgical History:   Procedure Laterality Date   • APPENDECTOMY     • BIOPSY PENILE      couple years ago was benign of prostate   • CARDIAC CATHETERIZATION      hAD FOUR STENTS   • CARDIAC SURGERY     • COLONOSCOPY      10 PLUS YEARS AGO   • COLONOSCOPY N/A 8/13/2018    Procedure: COLONOSCOPY and polypectomy;  Surgeon: Mili Iraheta MD;  Location: [facility] OR;  Service: Gastroenterology   • CYSTOSCOPY      2 years ago   • ENDOSCOPY N/A 8/11/2018    Procedure: ESOPHAGOGASTRODUODENOSCOPY w/ biopsies;  Surgeon: Mili Iraheta MD;  Location: [facility] OR;  Service: Gastroenterology   • ESOPHAGOSCOPY N/A 9/21/2018    Procedure: ESOPHAGOSCOPY WITH FOCAL RADIO FREQUENCY ABLATION;  Surgeon: Jean Rasmussen MD;  Location: [facility] ENDOSCOPY;  Service: Gastroenterology   • TURP / TRANSURETHRAL INCISION / DRAINAGE PROSTATE      2 years ago     Family History   Problem Relation Age of Onset   • Hypertension Mother    • Arthritis Mother    • Heart disease Father    • Heart disease Sister    • Hypertension Maternal Aunt    • Hypertension Maternal Uncle    • Heart disease Maternal Grandfather    • Colon cancer Neg Hx    • Colon polyps Neg Hx      Social History     Tobacco Use   • Smoking status: Never Smoker   • Smokeless tobacco: Never Used   Vaping Use   • Vaping Use: Never used   Substance Use Topics   • Alcohol use: No   • Drug use: No     Medications Prior to Admission   Medication Sig Dispense Refill Last Dose   • allopurinol (ZYLOPRIM) 300 MG tablet allopurinol 300 mg tablet   TAKE 1 TABLET DAILY   7/5/2021 at 2100   • ASPIRIN 81 PO aspirin 81 mg tablet,delayed release   Take 1 tablet every day by oral route.   7/6/2021 at 0800   • atorvastatin (LIPITOR) 40 MG tablet atorvastatin 40 mg tablet   TAKE 1 TABLET DAILY IN THE EVENING   7/5/2021 at 2100   • carvedilol (COREG) 25 MG tablet Take 25 mg by  "mouth 2 (Two) Times a Day With Meals.   7/6/2021 at 0800   • dapagliflozin (Farxiga) 5 MG tablet tablet Farxiga 5 mg tablet   Take 1 tablet every day by oral route for 30 days.   7/5/2021 at 0800   • finasteride (PROSCAR) 5 MG tablet finasteride 5 mg tablet   7/6/2021 at 0800   • lisinopril (PRINIVIL,ZESTRIL) 10 MG tablet Take 10 mg by mouth Daily.   7/5/2021 at 1200   • omeprazole (priLOSEC) 40 MG capsule omeprazole 40 mg capsule,delayed release   TAKE 1 CAPSULE BY MOUTH EVERY DAY   7/6/2021 at 0800   • SITagliptin-metFORMIN HCl ER (Janumet XR)  MG tablet 1 tablet 2 (two) times a day.   7/6/2021 at 0800   • nitroglycerin (NITROSTAT) 0.4 MG SL tablet Place 1 tablet under the tongue Every 5 (Five) Minutes As Needed for Chest Pain. Take no more than 3 doses in 15 minutes. 30 tablet 1 Unknown at Unknown time     Allergies:  Patient has no known allergies.      There is no immunization history on file for this patient.    REVIEW OF SYSTEMS:  Please see the above history of present illness for pertinent positives and negatives.  The remainder of the patient's systems have been reviewed and are negative.     Vital Signs  Temp:  [96.9 °F (36.1 °C)-97.9 °F (36.6 °C)] 96.9 °F (36.1 °C)  Heart Rate:  [46-64] 51  Resp:  [17-20] 17  BP: (125-182)/(70-94) 130/70   Body mass index is 29.72 kg/m².    Flowsheet Rows      First Filed Value   Admission Height  180.3 cm (71\") Documented at 07/06/2021 1405   Admission Weight  96.2 kg (212 lb) Documented at 07/06/2021 1405             Physical Exam  Vitals reviewed.   Constitutional:       General: He is not in acute distress.     Appearance: Normal appearance. He is not ill-appearing.   HENT:      Head: Normocephalic and atraumatic.      Nose: Nose normal.      Mouth/Throat:      Mouth: Mucous membranes are moist.   Eyes:      Extraocular Movements: Extraocular movements intact.      Pupils: Pupils are equal, round, and reactive to light.   Cardiovascular:      Rate and Rhythm: " Normal rate and regular rhythm.   Pulmonary:      Effort: Pulmonary effort is normal. No respiratory distress.      Breath sounds: Normal breath sounds. No wheezing or rales.   Abdominal:      General: Abdomen is flat. Bowel sounds are normal. There is no distension.      Palpations: Abdomen is soft.      Tenderness: There is no abdominal tenderness. There is no guarding.   Musculoskeletal:         General: No swelling.   Skin:     General: Skin is warm and dry.      Capillary Refill: Capillary refill takes less than 2 seconds.      Findings: No erythema.   Neurological:      General: No focal deficit present.      Mental Status: He is alert and oriented to person, place, and time.   Psychiatric:         Mood and Affect: Mood normal.         Behavior: Behavior normal.       Results Review:    I reviewed the patient's new clinical results.  Lab Results (most recent)     Procedure Component Value Units Date/Time    Troponin [[id number]]  (Normal) Collected: 07/06/21 1723    Specimen: Blood Updated: 07/06/21 1756     Troponin T <0.010 ng/mL     Narrative:      Troponin T Reference Range:  <= 0.03 ng/mL-   Negative for AMI  >0.03 ng/mL-     Abnormal for myocardial necrosis.  Clinicians would have to utilize clinical acumen, EKG, Troponin and serial changes to determine if it is an Acute Myocardial Infarction or myocardial injury due to an underlying chronic condition.       Results may be falsely decreased if patient taking Biotin.      Comprehensive Metabolic Panel [[id number]]  (Abnormal) Collected: 07/06/21 1413    Specimen: Blood Updated: 07/06/21 1450     Glucose 198 mg/dL      BUN 18 mg/dL      Creatinine 1.08 mg/dL      Sodium 132 mmol/L      Potassium 4.7 mmol/L      Comment: Slight hemolysis detected by analyzer. Results may be affected.        Chloride 100 mmol/L      CO2 21.2 mmol/L      Calcium 9.7 mg/dL      Total Protein 6.7 g/dL      Albumin 4.50 g/dL      ALT (SGPT) 16 U/L      AST (SGOT) 16 U/L       Comment: Slight hemolysis detected by analyzer. Results may be affected.        Alkaline Phosphatase 63 U/L      Total Bilirubin 0.4 mg/dL      eGFR Non African Amer 67 mL/min/1.73      Globulin 2.2 gm/dL      A/G Ratio 2.0 g/dL      BUN/Creatinine Ratio 16.7     Anion Gap 10.8 mmol/L     Narrative:      GFR Normal >60  Chronic Kidney Disease <60  Kidney Failure <15      Troponin [[id number]]  (Normal) Collected: 07/06/21 1413    Specimen: Blood Updated: 07/06/21 1442     Troponin T <0.010 ng/mL     Narrative:      Troponin T Reference Range:  <= 0.03 ng/mL-   Negative for AMI  >0.03 ng/mL-     Abnormal for myocardial necrosis.  Clinicians would have to utilize clinical acumen, EKG, Troponin and serial changes to determine if it is an Acute Myocardial Infarction or myocardial injury due to an underlying chronic condition.       Results may be falsely decreased if patient taking Biotin.      D-dimer, Quantitative [[id number]]  (Normal) Collected: 07/06/21 1412    Specimen: Blood Updated: 07/06/21 1437     D-Dimer, Quantitative <0.27 MCGFEU/mL     Narrative:      Can be elevated in, but is not diagnostic for deep vein thrombosis (DVT) or pulmonary embolis (PE).  It is also elevated in other medical conditions.  Clinical correlation is required.  The negative cut-off value for the D-Dimer is 0.50 mcg FEU/mL for DVT and PE.      CBC & Differential [[id number]]  (Normal) Collected: 07/06/21 1412    Specimen: Blood Updated: 07/06/21 1422    Narrative:      The following orders were created for panel order CBC & Differential.  Procedure                               Abnormality         Status                     ---------                               -----------         ------                     CBC Auto Differential[[id number]]        Normal              Final result                 Please view results for these tests on the individual orders.    CBC Auto Differential [[id number]]  (Normal) Collected: 07/06/21 1412     Specimen: Blood Updated: 07/06/21 1422     WBC 8.69 10*3/mm3      RBC 5.28 10*6/mm3      Hemoglobin 16.1 g/dL      Hematocrit 49.7 %      MCV 94.1 fL      MCH 30.5 pg      MCHC 32.4 g/dL      RDW 14.0 %      RDW-SD 48.3 fl      MPV 10.8 fL      Platelets 210 10*3/mm3      Neutrophil % 63.4 %      Lymphocyte % 25.7 %      Monocyte % 9.1 %      Eosinophil % 1.2 %      Basophil % 0.3 %      Immature Grans % 0.3 %      Neutrophils, Absolute 5.51 10*3/mm3      Lymphocytes, Absolute 2.23 10*3/mm3      Monocytes, Absolute 0.79 10*3/mm3      Eosinophils, Absolute 0.10 10*3/mm3      Basophils, Absolute 0.03 10*3/mm3      Immature Grans, Absolute 0.03 10*3/mm3      nRBC 0.0 /100 WBC           Imaging Results (Most Recent)     Procedure Component Value Units Date/Time    XR Chest 1 View [[id number]] Collected: 07/06/21 1451     Updated: 07/06/21 1454    Narrative:      AP PORTABLE CHEST, 07/06/2021     HISTORY:  Tightness and pain in chest starting earlier today     COMPARISON:  01/02/2021     TECHNIQUE:  AP portable chest x-ray.     FINDINGS:  Heart size and pulmonary vascularity are normal. The lungs are expanded  and clear. No visible pulmonary infiltrate or pleural effusion.           Impression:      Negative single view chest     This report was finalized on 7/6/2021 2:51 PM by Dr. Martínez Ayon MD.           reviewed    ECG/EMG Results (most recent)     Procedure Component Value Units Date/Time    ECG 12 Lead [[id number]] Collected: 07/06/21 1406     Updated: 07/06/21 1528     QT Interval 400 ms     Narrative:      HEART RATE= 59  bpm  RR Interval= 1020  ms  NC Interval= 208  ms  P Horizontal Axis= 33  deg  P Front Axis= 27  deg  QRSD Interval= 95  ms  QT Interval= 400  ms  QRS Axis= -2  deg  T Wave Axis= 1  deg  - NORMAL ECG -  Sinus rhythm  No change from prior tracing  Electronically Signed By: Brenda Silveira ([facility]) 06-Jul-2021 15:26:46  Date and Time of Study: 2021-07-06 14:06:33        reviewed    Assessment/Plan    Chest pain, R/O ACS:  CAD/HLD:  Bradycardia:   H/O MI with stents x 4:   H/O PSVT: Cardiology consulted  N.p.o. at midnight in case of stress testing  Normal stress nuclear 3/4/2020  Check echo in a.m.  Troponins thus far negative  Lipid panel in a.m.  Continue home aspirin, Coreg (place hold parameters), lisinopril, Lipitor  Monitor on telemetry    DM2 with hyperglycemia: A1c 7.6%  Body mass index is 29.72 kg/m².  TSH normal  Hold oral agents, add low-dose SSI with Accu-Cheks    CKD stage III:  Baseline creatinine 0.8-1.1, currently 1.08, no current acute issues    Hypertension:  Blood pressure near goal, continue home Coreg (hold parameters parameters placed) and lisinopril as above    GERD/Perez's esophagus with high grade dysplasia:  History of GI bleed: No current acute concerns, add Protonix 40 mg daily    BPH: No current acute issues, add Proscar 5 mg daily    I discussed the patient's findings and my recommendations with patient and staff.     Radha Flores, DMITRY  07/06/21  21:30 EDT

## 2021-07-06 NOTE — PROGRESS NOTES
"Pharmacy Consult - Lovenox    [facility] Le Leopoldo has been consulted for pharmacy to dose enoxaparin for VTE Prophylaxis   per Radha Flores's request.         Relevant clinical data and objective history reviewed:  76 y.o. male 180.3 cm (71\") 96.7 kg (213 lb 1.6 oz)    Home Anticoagulation: aspirin    Past Medical History:   Diagnosis Date    Acute gastrointestinal hemorrhage 8/10/2018    Perez's esophagus with high grade dysplasia 9/18/2018    Added automatically from request for surgery [id number]    BPH (benign prostatic hyperplasia)     CAD (coronary artery disease)     CKD (chronic kidney disease) stage 3, GFR 30-59 ml/min (CMS/[facility]) 5/4/2021    Colon polyp     GERD (gastroesophageal reflux disease)     History of transfusion     Hyperlipidemia     Hypertension     Osteoarthritis     PSVT (paroxysmal supraventricular tachycardia) (CMS/[facility])     Type 2 diabetes mellitus (CMS/[facility])      has No Known Allergies.    Lab Results   Component Value Date    WBC 8.69 07/06/2021    HGB 16.1 07/06/2021    HCT 49.7 07/06/2021    MCV 94.1 07/06/2021     07/06/2021     Lab Results   Component Value Date    GLUCOSE 198 (H) 07/06/2021    CALCIUM 9.7 07/06/2021     (L) 07/06/2021    K 4.7 07/06/2021    CO2 21.2 (L) 07/06/2021     07/06/2021    BUN 18 07/06/2021    CREATININE 1.08 07/06/2021    EGFRIFNONA 67 07/06/2021    BCR 16.7 07/06/2021    ANIONGAP 10.8 07/06/2021       Estimated Creatinine Clearance: 71.2 mL/min (by C-G formula based on SCr of 1.08 mg/dL).    Active Inpatient Anticoagulation Orders: aspirin    Assessment/Plan    Will start patient on 40 mg subcutaneous every 24 hours, adjusted for renal function. Labs to be ordered by clinical pharmacist if clinically indicated. Pharmacy will continue to follow.     Mau Otoole [facility]    "

## 2021-07-07 ENCOUNTER — HOSPITAL ENCOUNTER (INPATIENT)
Facility: HOSPITAL | Age: 76
LOS: 1 days | Discharge: HOME | End: 2021-07-08
Attending: INTERNAL MEDICINE | Admitting: INTERNAL MEDICINE

## 2021-07-07 ENCOUNTER — APPOINTMENT (OUTPATIENT)
Dept: CARDIOLOGY | Facility: HOSPITAL | Age: 76
End: 2021-07-07

## 2021-07-07 VITALS
OXYGEN SATURATION: 96 % | SYSTOLIC BLOOD PRESSURE: 162 MMHG | HEIGHT: 71 IN | WEIGHT: 213.1 LBS | DIASTOLIC BLOOD PRESSURE: 85 MMHG | HEART RATE: 52 BPM | RESPIRATION RATE: 16 BRPM | TEMPERATURE: 97.9 F | BODY MASS INDEX: 29.83 KG/M2

## 2021-07-07 DIAGNOSIS — R07.9 CHEST PAIN, UNSPECIFIED TYPE: ICD-10-CM

## 2021-07-07 DIAGNOSIS — I20.0 UNSTABLE ANGINA (HCC): Primary | ICD-10-CM

## 2021-07-07 DIAGNOSIS — R07.9 CHEST PAIN, UNSPECIFIED TYPE: Primary | ICD-10-CM

## 2021-07-07 LAB
CHOLEST SERPL-MCNC: 136 MG/DL (ref 0–200)
GLUCOSE BLDC GLUCOMTR-MCNC: 133 MG/DL (ref 70–130)
GLUCOSE BLDC GLUCOMTR-MCNC: 170 MG/DL (ref 70–130)
HDLC SERPL-MCNC: 29 MG/DL (ref 40–60)
LDLC SERPL CALC-MCNC: 48 MG/DL (ref 0–100)
LDLC/HDLC SERPL: 0.98 {RATIO}
QT INTERVAL: 439 MS
QT INTERVAL: 447 MS
TRIGL SERPL-MCNC: 393 MG/DL (ref 0–150)
TROPONIN T SERPL-MCNC: <0.01 NG/ML (ref 0–0.03)
TROPONIN T SERPL-MCNC: <0.01 NG/ML (ref 0–0.03)
VLDLC SERPL-MCNC: 59 MG/DL (ref 5–40)

## 2021-07-07 PROCEDURE — C9600 PERC DRUG-EL COR STENT SING: HCPCS | Performed by: INTERNAL MEDICINE

## 2021-07-07 PROCEDURE — 93010 ELECTROCARDIOGRAM REPORT: CPT | Performed by: INTERNAL MEDICINE

## 2021-07-07 PROCEDURE — 93005 ELECTROCARDIOGRAM TRACING: CPT | Performed by: INTERNAL MEDICINE

## 2021-07-07 PROCEDURE — 99153 MOD SED SAME PHYS/QHP EA: CPT | Performed by: INTERNAL MEDICINE

## 2021-07-07 PROCEDURE — B2111ZZ FLUOROSCOPY OF MULTIPLE CORONARY ARTERIES USING LOW OSMOLAR CONTRAST: ICD-10-PCS | Performed by: INTERNAL MEDICINE

## 2021-07-07 PROCEDURE — 80061 LIPID PANEL: CPT | Performed by: NURSE PRACTITIONER

## 2021-07-07 PROCEDURE — 99215 OFFICE O/P EST HI 40 MIN: CPT | Performed by: INTERNAL MEDICINE

## 2021-07-07 PROCEDURE — C1725 CATH, TRANSLUMIN NON-LASER: HCPCS | Performed by: INTERNAL MEDICINE

## 2021-07-07 PROCEDURE — C1894 INTRO/SHEATH, NON-LASER: HCPCS | Performed by: INTERNAL MEDICINE

## 2021-07-07 PROCEDURE — 99152 MOD SED SAME PHYS/QHP 5/>YRS: CPT | Performed by: INTERNAL MEDICINE

## 2021-07-07 PROCEDURE — C1769 GUIDE WIRE: HCPCS | Performed by: INTERNAL MEDICINE

## 2021-07-07 PROCEDURE — 93458 L HRT ARTERY/VENTRICLE ANGIO: CPT | Performed by: INTERNAL MEDICINE

## 2021-07-07 PROCEDURE — C1874 STENT, COATED/COV W/DEL SYS: HCPCS | Performed by: INTERNAL MEDICINE

## 2021-07-07 PROCEDURE — G0378 HOSPITAL OBSERVATION PER HR: HCPCS

## 2021-07-07 PROCEDURE — 25010000002 MIDAZOLAM PER 1 MG: Performed by: INTERNAL MEDICINE

## 2021-07-07 PROCEDURE — 93005 ELECTROCARDIOGRAM TRACING: CPT | Performed by: NURSE PRACTITIONER

## 2021-07-07 PROCEDURE — 25010000002 HEPARIN (PORCINE) PER 1000 UNITS: Performed by: INTERNAL MEDICINE

## 2021-07-07 PROCEDURE — 92928 PRQ TCAT PLMT NTRAC ST 1 LES: CPT | Performed by: INTERNAL MEDICINE

## 2021-07-07 PROCEDURE — 25010000002 MORPHINE PER 10 MG: Performed by: INTERNAL MEDICINE

## 2021-07-07 PROCEDURE — 82962 GLUCOSE BLOOD TEST: CPT

## 2021-07-07 PROCEDURE — 027034Z DILATION OF CORONARY ARTERY, ONE ARTERY WITH DRUG-ELUTING INTRALUMINAL DEVICE, PERCUTANEOUS APPROACH: ICD-10-PCS | Performed by: INTERNAL MEDICINE

## 2021-07-07 PROCEDURE — 84484 ASSAY OF TROPONIN QUANT: CPT | Performed by: NURSE PRACTITIONER

## 2021-07-07 PROCEDURE — C1887 CATHETER, GUIDING: HCPCS | Performed by: INTERNAL MEDICINE

## 2021-07-07 PROCEDURE — 25010000002 FENTANYL CITRATE (PF) 50 MCG/ML SOLUTION: Performed by: INTERNAL MEDICINE

## 2021-07-07 PROCEDURE — B2151ZZ FLUOROSCOPY OF LEFT HEART USING LOW OSMOLAR CONTRAST: ICD-10-PCS | Performed by: INTERNAL MEDICINE

## 2021-07-07 PROCEDURE — 0 IOPAMIDOL PER 1 ML: Performed by: INTERNAL MEDICINE

## 2021-07-07 PROCEDURE — 25010000002 KETOROLAC TROMETHAMINE PER 15 MG: Performed by: HOSPITALIST

## 2021-07-07 PROCEDURE — 96374 THER/PROPH/DIAG INJ IV PUSH: CPT

## 2021-07-07 PROCEDURE — 99217 PR OBSERVATION CARE DISCHARGE MANAGEMENT: CPT | Performed by: HOSPITALIST

## 2021-07-07 PROCEDURE — 85347 COAGULATION TIME ACTIVATED: CPT

## 2021-07-07 PROCEDURE — 4A023N7 MEASUREMENT OF CARDIAC SAMPLING AND PRESSURE, LEFT HEART, PERCUTANEOUS APPROACH: ICD-10-PCS | Performed by: INTERNAL MEDICINE

## 2021-07-07 DEVICE — XIENCE SIERRA™ EVEROLIMUS ELUTING CORONARY STENT SYSTEM 3.00 MM X 15 MM / RAPID-EXCHANGE
Type: IMPLANTABLE DEVICE | Status: FUNCTIONAL
Brand: XIENCE SIERRA™

## 2021-07-07 RX ORDER — ATORVASTATIN CALCIUM 20 MG/1
40 TABLET, FILM COATED ORAL DAILY
Status: DISCONTINUED | OUTPATIENT
Start: 2021-07-08 | End: 2021-07-08 | Stop reason: HOSPADM

## 2021-07-07 RX ORDER — SODIUM CHLORIDE 9 MG/ML
75 INJECTION, SOLUTION INTRAVENOUS CONTINUOUS
Status: DISCONTINUED | OUTPATIENT
Start: 2021-07-07 | End: 2021-07-08 | Stop reason: HOSPADM

## 2021-07-07 RX ORDER — MIDAZOLAM HYDROCHLORIDE 1 MG/ML
INJECTION INTRAMUSCULAR; INTRAVENOUS AS NEEDED
Status: DISCONTINUED | OUTPATIENT
Start: 2021-07-07 | End: 2021-07-07 | Stop reason: HOSPADM

## 2021-07-07 RX ORDER — ONDANSETRON 4 MG/1
4 TABLET, FILM COATED ORAL EVERY 6 HOURS PRN
Status: DISCONTINUED | OUTPATIENT
Start: 2021-07-07 | End: 2021-07-08 | Stop reason: HOSPADM

## 2021-07-07 RX ORDER — ONDANSETRON 2 MG/ML
4 INJECTION INTRAMUSCULAR; INTRAVENOUS EVERY 6 HOURS PRN
Status: DISCONTINUED | OUTPATIENT
Start: 2021-07-07 | End: 2021-07-08 | Stop reason: HOSPADM

## 2021-07-07 RX ORDER — HEPARIN SODIUM 1000 [USP'U]/ML
INJECTION, SOLUTION INTRAVENOUS; SUBCUTANEOUS AS NEEDED
Status: DISCONTINUED | OUTPATIENT
Start: 2021-07-07 | End: 2021-07-07 | Stop reason: HOSPADM

## 2021-07-07 RX ORDER — KETOROLAC TROMETHAMINE 30 MG/ML
15 INJECTION, SOLUTION INTRAMUSCULAR; INTRAVENOUS EVERY 6 HOURS PRN
Status: DISCONTINUED | OUTPATIENT
Start: 2021-07-07 | End: 2021-07-07 | Stop reason: HOSPADM

## 2021-07-07 RX ORDER — LIDOCAINE HYDROCHLORIDE 20 MG/ML
INJECTION, SOLUTION INFILTRATION; PERINEURAL AS NEEDED
Status: DISCONTINUED | OUTPATIENT
Start: 2021-07-07 | End: 2021-07-07 | Stop reason: HOSPADM

## 2021-07-07 RX ORDER — LISINOPRIL 20 MG/1
10 TABLET ORAL DAILY
Status: DISCONTINUED | OUTPATIENT
Start: 2021-07-07 | End: 2021-07-08 | Stop reason: HOSPADM

## 2021-07-07 RX ORDER — MORPHINE SULFATE 2 MG/ML
2 INJECTION, SOLUTION INTRAMUSCULAR; INTRAVENOUS
Status: DISCONTINUED | OUTPATIENT
Start: 2021-07-07 | End: 2021-07-07 | Stop reason: HOSPADM

## 2021-07-07 RX ORDER — CLOPIDOGREL BISULFATE 75 MG/1
75 TABLET ORAL DAILY
Status: DISCONTINUED | OUTPATIENT
Start: 2021-07-08 | End: 2021-07-08 | Stop reason: HOSPADM

## 2021-07-07 RX ORDER — CLOPIDOGREL BISULFATE 75 MG/1
TABLET ORAL AS NEEDED
Status: DISCONTINUED | OUTPATIENT
Start: 2021-07-07 | End: 2021-07-07 | Stop reason: HOSPADM

## 2021-07-07 RX ORDER — CARVEDILOL 25 MG/1
25 TABLET ORAL 2 TIMES DAILY WITH MEALS
Status: DISCONTINUED | OUTPATIENT
Start: 2021-07-07 | End: 2021-07-08 | Stop reason: HOSPADM

## 2021-07-07 RX ORDER — KETOROLAC TROMETHAMINE 30 MG/ML
INJECTION, SOLUTION INTRAMUSCULAR; INTRAVENOUS
Status: DISCONTINUED
Start: 2021-07-07 | End: 2021-07-07 | Stop reason: HOSPADM

## 2021-07-07 RX ORDER — FENTANYL CITRATE 50 UG/ML
INJECTION, SOLUTION INTRAMUSCULAR; INTRAVENOUS AS NEEDED
Status: DISCONTINUED | OUTPATIENT
Start: 2021-07-07 | End: 2021-07-07 | Stop reason: HOSPADM

## 2021-07-07 RX ORDER — ACETAMINOPHEN 325 MG/1
650 TABLET ORAL EVERY 4 HOURS PRN
Status: DISCONTINUED | OUTPATIENT
Start: 2021-07-07 | End: 2021-07-08 | Stop reason: HOSPADM

## 2021-07-07 RX ORDER — ASPIRIN 325 MG
TABLET ORAL AS NEEDED
Status: DISCONTINUED | OUTPATIENT
Start: 2021-07-07 | End: 2021-07-07 | Stop reason: HOSPADM

## 2021-07-07 RX ADMIN — SODIUM CHLORIDE 75 ML/HR: 9 INJECTION, SOLUTION INTRAVENOUS at 11:42

## 2021-07-07 RX ADMIN — NITROGLYCERIN 0.4 MG: 0.4 TABLET SUBLINGUAL at 08:53

## 2021-07-07 RX ADMIN — ALLOPURINOL 300 MG: 300 TABLET ORAL at 08:49

## 2021-07-07 RX ADMIN — PANTOPRAZOLE SODIUM 40 MG: 40 TABLET, DELAYED RELEASE ORAL at 06:03

## 2021-07-07 RX ADMIN — FINASTERIDE 5 MG: 5 TABLET, FILM COATED ORAL at 08:48

## 2021-07-07 RX ADMIN — SODIUM CHLORIDE, PRESERVATIVE FREE 10 ML: 5 INJECTION INTRAVENOUS at 08:52

## 2021-07-07 RX ADMIN — LISINOPRIL 10 MG: 20 TABLET ORAL at 18:04

## 2021-07-07 RX ADMIN — SODIUM CHLORIDE 100 ML/HR: 9 INJECTION, SOLUTION INTRAVENOUS at 00:14

## 2021-07-07 RX ADMIN — KETOROLAC TROMETHAMINE 15 MG: 30 INJECTION, SOLUTION INTRAMUSCULAR; INTRAVENOUS at 09:03

## 2021-07-07 RX ADMIN — NITROGLYCERIN 0.4 MG: 0.4 TABLET SUBLINGUAL at 08:48

## 2021-07-07 RX ADMIN — ATORVASTATIN CALCIUM 40 MG: 40 TABLET, FILM COATED ORAL at 08:49

## 2021-07-07 RX ADMIN — MORPHINE SULFATE 2 MG: 2 INJECTION, SOLUTION INTRAMUSCULAR; INTRAVENOUS at 11:47

## 2021-07-07 NOTE — H&P (VIEW-ONLY)
Date of Hospital Visit: [unfilled]ENC@  Encounter Provider: Brenda Silveira MD  Place of Service: [facility]  Patient Name: Kwabena Le Jr.  :1945  Referral Provider: No ref. provider found    Chief complaint    Chest pain    History of Present Illness    This is a 76-year-old gentleman with a history of coronary artery disease, diabetes also to, hyperlipidemia, hypertension and SVT.      He had a non-STEMI in  and received 3 drug-eluting stents, stent to second obtuse marginal which was the culprit vessel, stent to the first obtuse marginal and stent to the first diagonal.  His LAD disease was treated medically.  He had nonischemic stress nuclear perfusion study in 2020 at [facility].    He had SVT 2020 and chest pain associated with this.  He was given adenosine by EMS and converted to sinus rhythm.  He had this happen again 2021.  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  The second episode occurred right after he had COVID-19.    He presented with chest pain on 2021.  He was sitting his recliner after eating breakfast and began having chest heaviness with radiation to his shoulders and down to his elbows bilaterally.  The chest pain was left-sided described as a pressure or heaviness lasting 15 minutes.  He called his grandson who drove him to the hospital on the way he had no other episode of chest pain.  In the emergency department he was chest pain-free.  When he got admitted, he had another episode of chest pain starting at 4 PM for which she received aspirin but no nitroglycerin and this also got better.  He had another episode of chest pain this morning.  None of the episodes that he has had since arrival have been as severe as the first episode and none of them had radiation to his shoulders.  His troponins have been negative x4 glucose 198, sodium 132, otherwise normal CMP, normal TSH,  normal D-dimer, hemoglobin A1c 7.6, normal CBC, LDL 48, HDL 29.  EKG showed normal sinus rhythm, normal ECG.  Chest x-ray was negative.  His blood pressure on arrival was 182/94, he is afebrile, heart rate in the 60s with a heart rate dropping down into the low 50s and 40s.  Blood pressure did drop down into the 130s over 70s and his saturations been 90% on room air.  No SVT documented overnight on telemetry.    He said that he has had fatigue and dyspnea since having COVID-19 January 2021.  He denies fevers, chills, cough, nausea, vomiting or diarrhea.  He uses no tobacco and has rare alcohol.  Lives at home with his wife.    Past Medical History:   Diagnosis Date   • Acute gastrointestinal hemorrhage 8/10/2018   • Perez's esophagus with high grade dysplasia 9/18/2018    Added automatically from request for surgery [id number]   • BPH (benign prostatic hyperplasia)    • CAD (coronary artery disease)    • CKD (chronic kidney disease) stage 3, GFR 30-59 ml/min (CMS/HCC) 5/4/2021   • Colon polyp    • GERD (gastroesophageal reflux disease)    • History of transfusion    • Hyperlipidemia    • Hypertension    • Osteoarthritis    • PSVT (paroxysmal supraventricular tachycardia) (CMS/HCC)    • Type 2 diabetes mellitus (CMS/HCC)        Past Surgical History:   Procedure Laterality Date   • APPENDECTOMY     • BIOPSY PENILE      couple years ago was benign of prostate   • CARDIAC CATHETERIZATION      hAD FOUR STENTS   • CARDIAC SURGERY     • COLONOSCOPY      10 PLUS YEARS AGO   • COLONOSCOPY N/A 8/13/2018    Procedure: COLONOSCOPY and polypectomy;  Surgeon: Mili Iraheta MD;  Location: [facility] OR;  Service: Gastroenterology   • CYSTOSCOPY      2 years ago   • ENDOSCOPY N/A 8/11/2018    Procedure: ESOPHAGOGASTRODUODENOSCOPY w/ biopsies;  Surgeon: Mili Iraheta MD;  Location: [facility] OR;  Service: Gastroenterology   • ESOPHAGOSCOPY N/A 9/21/2018    Procedure: ESOPHAGOSCOPY WITH FOCAL RADIO FREQUENCY ABLATION;  Surgeon: Valery  Jean MUNSON MD;  Location: [facility] ENDOSCOPY;  Service: Gastroenterology   • TURP / TRANSURETHRAL INCISION / DRAINAGE PROSTATE      2 years ago       Medications Prior to Admission   Medication Sig Dispense Refill Last Dose   • allopurinol (ZYLOPRIM) 300 MG tablet allopurinol 300 mg tablet   TAKE 1 TABLET DAILY   7/5/2021 at 2100   • ASPIRIN 81 PO aspirin 81 mg tablet,delayed release   Take 1 tablet every day by oral route.   7/6/2021 at 0800   • atorvastatin (LIPITOR) 40 MG tablet atorvastatin 40 mg tablet   TAKE 1 TABLET DAILY IN THE EVENING   7/5/2021 at 2100   • carvedilol (COREG) 25 MG tablet Take 25 mg by mouth 2 (Two) Times a Day With Meals.   7/6/2021 at 0800   • dapagliflozin (Farxiga) 5 MG tablet tablet Farxiga 5 mg tablet   Take 1 tablet every day by oral route for 30 days.   7/5/2021 at 0800   • finasteride (PROSCAR) 5 MG tablet finasteride 5 mg tablet   7/6/2021 at 0800   • lisinopril (PRINIVIL,ZESTRIL) 10 MG tablet Take 10 mg by mouth Daily.   7/5/2021 at 1200   • omeprazole (priLOSEC) 40 MG capsule omeprazole 40 mg capsule,delayed release   TAKE 1 CAPSULE BY MOUTH EVERY DAY   7/6/2021 at 0800   • SITagliptin-metFORMIN HCl ER (Janumet XR)  MG tablet 1 tablet 2 (two) times a day.   7/6/2021 at 0800   • nitroglycerin (NITROSTAT) 0.4 MG SL tablet Place 1 tablet under the tongue Every 5 (Five) Minutes As Needed for Chest Pain. Take no more than 3 doses in 15 minutes. 30 tablet 1 Unknown at Unknown time       Current Meds  Scheduled Meds:allopurinol, 300 mg, Oral, Daily  aspirin, 81 mg, Oral, Daily  atorvastatin, 40 mg, Oral, Daily  carvedilol, 25 mg, Oral, BID With Meals  enoxaparin, 40 mg, Subcutaneous, Q24H  finasteride, 5 mg, Oral, Daily  insulin aspart, 0-7 Units, Subcutaneous, TID AC  lisinopril, 10 mg, Oral, Daily  pantoprazole, 40 mg, Oral, QAM  sodium chloride, 10 mL, Intravenous, Q12H      Continuous Infusions:Pharmacy to Dose enoxaparin (LOVENOX),   sodium chloride, 100 mL/hr, Last Rate:  "100 mL/hr (07/07/21 0516)      PRN Meds:.•  acetaminophen **OR** acetaminophen **OR** acetaminophen  •  dextrose  •  dextrose  •  glucagon (human recombinant)  •  melatonin  •  nitroglycerin  •  ondansetron **OR** ondansetron  •  Pharmacy to Dose enoxaparin (LOVENOX)  •  sodium chloride  •  sodium chloride    Allergies as of 07/06/2021   • (No Known Allergies)       Social History     Socioeconomic History   • Marital status:      Spouse name: Not on file   • Number of children: 2   • Years of education: Not on file   • Highest education level: Not on file   Tobacco Use   • Smoking status: Never Smoker   • Smokeless tobacco: Never Used   Vaping Use   • Vaping Use: Never used   Substance and Sexual Activity   • Alcohol use: No   • Drug use: No   • Sexual activity: Defer     Partners: Male       Family History   Problem Relation Age of Onset   • Hypertension Mother    • Arthritis Mother    • Heart disease Father    • Heart disease Sister    • Hypertension Maternal Aunt    • Hypertension Maternal Uncle    • Heart disease Maternal Grandfather    • Colon cancer Neg Hx    • Colon polyps Neg Hx        REVIEW OF SYSTEMS:   12 point ROS was performed and is negative except as outlined in HPI         Objective:   Temp:  [96.9 °F (36.1 °C)-98.3 °F (36.8 °C)] 98.3 °F (36.8 °C)  Heart Rate:  [46-64] 56  Resp:  [17-20] 17  BP: (111-182)/(63-94) 137/65  Body mass index is 29.72 kg/m².  Flowsheet Rows      First Filed Value   Admission Height  180.3 cm (71\") Documented at 07/06/2021 1405   Admission Weight  96.2 kg (212 lb) Documented at 07/06/2021 1405        Vitals:    07/07/21 0649   BP:    Pulse: 56   Resp:    Temp:    SpO2:        General Appearance:    Alert, cooperative, in no acute distress   Head:    Normocephalic, without obvious abnormality, atraumatic   Eyes:            Lids and lashes normal, conjunctivae and sclerae normal, no   icterus, no pallor, corneas clear   Ears:    Ears appear intact with no " abnormalities noted   Throat:   No oral lesions, no thrush, oral mucosa moist   Neck:   No adenopathy, supple, trachea midline, no thyromegaly, no   carotid bruit, no JVD   Back:     No kyphosis present, no scoliosis present, no skin lesions, erythema or scars, no tenderness to palpation, range of motion normal   Lungs:     Clear to auscultation,respirations regular, even and unlabored    Heart:    Regular rhythm and normal rate, normal S1 and S2, no murmur, no gallop, no rub, no click   Chest Wall:    No abnormalities observed   Abdomen:     Normal bowel sounds, no masses, no organomegaly, soft, nontender, nondistended, no guarding, no rebound  tenderness   Extremities:   Moves all extremities well, no edema, no cyanosis, no redness   Pulses:   Pulses palpable and equal bilaterally. Normal radial, carotid, femoral, dorsalis pedis and posterior tibial pulses bilaterally. Normal abdominal aorta   Skin:  Psychiatric:   No bleeding, bruising or rash    Alert and oriented x 3, normal mood and affect                 Lab Review:      Results from last 7 days   Lab Units 07/06/21  1413   SODIUM mmol/L 132*   POTASSIUM mmol/L 4.7   CHLORIDE mmol/L 100   CO2 mmol/L 21.2*   BUN mg/dL 18   CREATININE mg/dL 1.08   CALCIUM mg/dL 9.7   BILIRUBIN mg/dL 0.4   ALK PHOS U/L 63   ALT (SGPT) U/L 16   AST (SGOT) U/L 16   GLUCOSE mg/dL 198*     Results from last 7 days   Lab Units 07/07/21  0429 07/06/21  2158 07/06/21  1723   TROPONIN T ng/mL <0.010 <0.010 <0.010       Results from last 7 days   Lab Units 07/06/21  1412   WBC 10*3/mm3 8.69   HEMOGLOBIN g/dL 16.1   HEMATOCRIT % 49.7   PLATELETS 10*3/mm3 210             I personally viewed and interpreted the patient's EKG/Telemetry data  )  Patient Active Problem List   Diagnosis   • Perez's esophagus with high grade dysplasia   • CAD (coronary artery disease)   • Hyperlipidemia   • PSVT (paroxysmal supraventricular tachycardia) (CMS/HCC)   • Type 2 diabetes mellitus (CMS/HCC)   •  Hypertension   • CKD (chronic kidney disease) stage 3, GFR 30-59 ml/min (CMS/[facility])   • Chest pain     Assessment and Plan:    1. Chest pain, no ACS, similar to prior anginal chest pain.  Thought versus was SVT but his heart was not racing.  Currently he is chest pain-free.  2. CAD, h/o NSTEMI and stents in 2007.   3. History of SVT  4. Hyperlipidemia, low HDL, on atorvastatin at home.  5. Diabetes mellitus type 2  6. Hypertension    Spoke with patient about options as I am concerned he probably has coronary ischemia, will proceed with cardiac catheterization to be done at [facility].  Spoke with Dr. Willett about the plans.  Risks and benefits of procedure explained to patient and he is amenable.    Brenda Silveira MD  07/07/21  07:30 EDT.  Time spent in reviewing chart, discussion and examination:

## 2021-07-07 NOTE — NURSING NOTE
PT GIVEN MORPHINE 2MG AT 1148AM.  PAIN ASSESSED 5MIN LATER, PER PT CHEST PAIN 2/10.  PAIN ASSESSED AT 1205, PAIN A 3/10

## 2021-07-07 NOTE — PLAN OF CARE
Goal Outcome Evaluation:  Plan of Care Reviewed With: patient        Progress: improving  Outcome Summary: pt sinus gus on tele, hypertensive gave meds per MAR, on RA, right radial site has TR-band, scant amount of blood present slowing release process, no complaints of CP, WCTM

## 2021-07-07 NOTE — DISCHARGE SUMMARY
"Kwabena Le Jr.  1945  [id number]    Hospitalists Discharge Summary    Date of Admission: 7/6/2021  Date of Discharge:  7/7/2021    History of Present Illness from hospitals on admit:   \"Mr. Le is a pleasant 76-year-old male that presented to the emergency department secondary to multiple episodes of midsternal chest pain radiating to both arms, mild nausea and shortness of air earlier today.  He notes 5-6 episodes lasting 15 minutes each prior to arrival in ER.  He notes another 5-6 episodes between 4 PM and 5 PM.  He reports pain improves slightly when he holds his breath and presses midsternal.  Pain otherwise resolves without intervention.  He reports similar symptoms when he had SVT in January and was sure that this was going on again however diagnostics in ER did not reveal this.  In the past he reports having required adenosine twice.  He also reports a history of MI with stents x4 approximately 13 years ago.     He reports has had Covid and vaccinations  He denies tobacco use, drinks occasional beer and denies illicit drug use     He has a known history of CAD, MI with stents, PSVT, DM2, HTN, HLD, GERD/Perez's esophagus with high-grade dysplasia, history of GI bleed, CKD stage III, BPH     He otherwise denies f/c/headache/rhinorrhea/nasal congestion/lightheadedness/syncopal sensation/cough/soa/v/d/abdominal pain/recent illness/sick exposures/change in bowel or bladder habits/no weight change/bloody emesis or bloody stools/change in medications or any other new concerns.\"  Primary Discharge diagnoses:   Chest pain, R/O ACS  Secondary Discharge Diagnoses:   CAD/HLD  Bradycardia  History of MI with stents x4  History of PSVT  DM2 with hyperglycemia  CKD stage III  Hypertension  GERD/Perez's esophagus with high-grade dysplasia  History of GI bleed  BPH  Hospital Course Summary:   The patient was admitted for chest pain, rule out ACS.  Troponins x3 were negative despite recurrent episodes of chest " pain.  No episodes of PSVT were noted.  The patient was followed in consultation by cardiology.  He is now being transferred for coronary angiogram.    PCP  Patient Care Team:  Charlie Arreguin MD as PCP - General  Charlie Arreguin MD as PCP - Family Medicine    Consults:   Consults     Date and Time Order Name Status Description    7/6/2021  7:21 PM Inpatient Cardiology Consult            Operations and Procedures Performed:       XR Chest 1 View    Result Date: 7/6/2021  Narrative: AP PORTABLE CHEST, 07/06/2021  HISTORY: Tightness and pain in chest starting earlier today  COMPARISON: 01/02/2021  TECHNIQUE: AP portable chest x-ray.  FINDINGS: Heart size and pulmonary vascularity are normal. The lungs are expanded and clear. No visible pulmonary infiltrate or pleural effusion.       Impression: Negative single view chest  This report was finalized on 7/6/2021 2:51 PM by Dr. Martínez Ayon MD.      Allergies:  has No Known Allergies.    Neptali  Reviewed    Discharge Medications:  See transfer MAR      Last Lab Results:   Lab Results (most recent)     Procedure Component Value Units Date/Time    POC Glucose Once [[id number]]  (Abnormal) Collected: 07/07/21 0533    Specimen: Blood Updated: 07/07/21 0550     Glucose 133 mg/dL      Comment: RN Notified R and V MAHENDRA Meter: [id number] : [id number] Lois JACKSON       Lipid Panel [[id number]]  (Abnormal) Collected: 07/07/21 0429    Specimen: Blood Updated: 07/07/21 0456     Total Cholesterol 136 mg/dL      Triglycerides 393 mg/dL      HDL Cholesterol 29 mg/dL      LDL Cholesterol  48 mg/dL      VLDL Cholesterol 59 mg/dL      LDL/HDL Ratio 0.98    Narrative:      Cholesterol Reference Ranges  (U.S. Department of Health and Human Services ATP III Classifications)    Desirable          <200 mg/dL  Borderline High    200-239 mg/dL  High Risk          >240 mg/dL      Triglyceride Reference Ranges  (U.S. Department of Health and Human Services ATP III  Classifications)    Normal           <150 mg/dL  Borderline High  150-199 mg/dL  High             200-499 mg/dL  Very High        >500 mg/dL    HDL Reference Ranges  (U.S. Department of Health and Human Services ATP III Classifcations)    Low     <40 mg/dl (major risk factor for CHD)  High    >60 mg/dl ('negative' risk factor for CHD)        LDL Reference Ranges  (U.S. Department of Health and Human Services ATP III Classifcations)    Optimal          <100 mg/dL  Near Optimal     100-129 mg/dL  Borderline High  130-159 mg/dL  High             160-189 mg/dL  Very High        >189 mg/dL    POC Glucose Once [[id number]]  (Abnormal) Collected: 07/07/21 0026    Specimen: Blood Updated: 07/07/21 0038     Glucose 170 mg/dL      Comment: RN Notified R Cheri MCCRAY Meter: [id number] : [id number] Lois JACKSON       Troponin [[id number]]  (Normal) Collected: 07/06/21 2158    Specimen: Blood Updated: 07/06/21 2222     Troponin T <0.010 ng/mL     Narrative:      Troponin T Reference Range:  <= 0.03 ng/mL-   Negative for AMI  >0.03 ng/mL-     Abnormal for myocardial necrosis.  Clinicians would have to utilize clinical acumen, EKG, Troponin and serial changes to determine if it is an Acute Myocardial Infarction or myocardial injury due to an underlying chronic condition.       Results may be falsely decreased if patient taking Biotin.      COVID PRE-OP / PRE-PROCEDURE SCREENING ORDER (NO ISOLATION) - Swab, Nasal Cavity [[id number]]  (Normal) Collected: 07/06/21 2013    Specimen: Swab from Nasal Cavity Updated: 07/06/21 2102    Narrative:      The following orders were created for panel order COVID PRE-OP / PRE-PROCEDURE SCREENING ORDER (NO ISOLATION) - Swab, Nasal Cavity.  Procedure                               Abnormality         Status                     ---------                               -----------         ------                     COVID-19,[facility] [facility]...[[id number]]  Normal              Final result                  Please view results for these tests on the individual orders.    COVID-19,[facility] [employer] IN-HOUSE,Nasal Swab No Transport Media 3-4 HR TAT - Swab, Nasal Cavity [[id number]]  (Normal) Collected: 07/06/21 2013    Specimen: Swab from Nasal Cavity Updated: 07/06/21 2102     COVID19 Not Detected    Narrative:      Fact sheet for providers: https://www.fda.gov/media/315941/download     Fact sheet for patients: https://www.fda.gov/media/987569/download    Test performed by PCR.    Consider negative results in combination with clinical observations, patient history, and epidemiological information.    TSH [[id number]]  (Normal) Collected: 07/06/21 1723    Specimen: Blood Updated: 07/06/21 1951     TSH 0.677 uIU/mL     Hemoglobin A1c [[id number]]  (Abnormal) Collected: 07/06/21 1412    Specimen: Blood Updated: 07/06/21 1942     Hemoglobin A1C 7.60 %     Narrative:      Hemoglobin A1C Ranges:    Increased Risk for Diabetes  5.7% to 6.4%  Diabetes                     >= 6.5%  Diabetic Goal                < 7.0%    Troponin [[id number]]  (Normal) Collected: 07/06/21 1723    Specimen: Blood Updated: 07/06/21 1756     Troponin T <0.010 ng/mL     Narrative:      Troponin T Reference Range:  <= 0.03 ng/mL-   Negative for AMI  >0.03 ng/mL-     Abnormal for myocardial necrosis.  Clinicians would have to utilize clinical acumen, EKG, Troponin and serial changes to determine if it is an Acute Myocardial Infarction or myocardial injury due to an underlying chronic condition.       Results may be falsely decreased if patient taking Biotin.      Comprehensive Metabolic Panel [[id number]]  (Abnormal) Collected: 07/06/21 1413    Specimen: Blood Updated: 07/06/21 1450     Glucose 198 mg/dL      BUN 18 mg/dL      Creatinine 1.08 mg/dL      Sodium 132 mmol/L      Potassium 4.7 mmol/L      Comment: Slight hemolysis detected by analyzer. Results may be affected.        Chloride 100 mmol/L      CO2 21.2 mmol/L      Calcium 9.7 mg/dL      Total  Protein 6.7 g/dL      Albumin 4.50 g/dL      ALT (SGPT) 16 U/L      AST (SGOT) 16 U/L      Comment: Slight hemolysis detected by analyzer. Results may be affected.        Alkaline Phosphatase 63 U/L      Total Bilirubin 0.4 mg/dL      eGFR Non African Amer 67 mL/min/1.73      Globulin 2.2 gm/dL      A/G Ratio 2.0 g/dL      BUN/Creatinine Ratio 16.7     Anion Gap 10.8 mmol/L     Narrative:      GFR Normal >60  Chronic Kidney Disease <60  Kidney Failure <15      D-dimer, Quantitative [[id number]]  (Normal) Collected: 07/06/21 1412    Specimen: Blood Updated: 07/06/21 1437     D-Dimer, Quantitative <0.27 MCGFEU/mL     Narrative:      Can be elevated in, but is not diagnostic for deep vein thrombosis (DVT) or pulmonary embolis (PE).  It is also elevated in other medical conditions.  Clinical correlation is required.  The negative cut-off value for the D-Dimer is 0.50 mcg FEU/mL for DVT and PE.      CBC & Differential [[id number]]  (Normal) Collected: 07/06/21 1412    Specimen: Blood Updated: 07/06/21 1422    Narrative:      The following orders were created for panel order CBC & Differential.  Procedure                               Abnormality         Status                     ---------                               -----------         ------                     CBC Auto Differential[[id number]]        Normal              Final result                 Please view results for these tests on the individual orders.    CBC Auto Differential [[id number]]  (Normal) Collected: 07/06/21 1412    Specimen: Blood Updated: 07/06/21 1422     WBC 8.69 10*3/mm3      RBC 5.28 10*6/mm3      Hemoglobin 16.1 g/dL      Hematocrit 49.7 %      MCV 94.1 fL      MCH 30.5 pg      MCHC 32.4 g/dL      RDW 14.0 %      RDW-SD 48.3 fl      MPV 10.8 fL      Platelets 210 10*3/mm3      Neutrophil % 63.4 %      Lymphocyte % 25.7 %      Monocyte % 9.1 %      Eosinophil % 1.2 %      Basophil % 0.3 %      Immature Grans % 0.3 %      Neutrophils, Absolute  5.51 10*3/mm3      Lymphocytes, Absolute 2.23 10*3/mm3      Monocytes, Absolute 0.79 10*3/mm3      Eosinophils, Absolute 0.10 10*3/mm3      Basophils, Absolute 0.03 10*3/mm3      Immature Grans, Absolute 0.03 10*3/mm3      nRBC 0.0 /100 WBC         Imaging Results (Most Recent)     Procedure Component Value Units Date/Time    XR Chest 1 View [[id number]] Collected: 07/06/21 1451     Updated: 07/06/21 1454    Narrative:      AP PORTABLE CHEST, 07/06/2021     HISTORY:  Tightness and pain in chest starting earlier today     COMPARISON:  01/02/2021     TECHNIQUE:  AP portable chest x-ray.     FINDINGS:  Heart size and pulmonary vascularity are normal. The lungs are expanded  and clear. No visible pulmonary infiltrate or pleural effusion.           Impression:      Negative single view chest     This report was finalized on 7/6/2021 2:51 PM by Dr. Martínez Ayon MD.             PROCEDURES:  None      Condition on Discharge:  Stable    Physical Exam at Discharge  Vital Signs  Temp:  [96.9 °F (36.1 °C)-98.3 °F (36.8 °C)] 98.3 °F (36.8 °C)  Heart Rate:  [46-64] 49  Resp:  [17-20] 17  BP: (111-182)/(63-94) 116/63   Body mass index is 29.72 kg/m².      Physical Exam  Constitutional:       Appearance: Normal appearance.   Cardiovascular:      Rate and Rhythm: Normal rate and regular rhythm.      Pulses: Normal pulses.   Pulmonary:      Effort: Pulmonary effort is normal.      Breath sounds: Normal breath sounds.   Abdominal:      General: Bowel sounds are normal.      Palpations: Abdomen is soft.   Neurological:      Mental Status: He is alert and oriented to person, place, and time.   Psychiatric:         Mood and Affect: Mood normal.         Behavior: Behavior normal.       Discharge Disposition  [facility]    Visiting Nurse:    No     Home PT/OT:  No     Home Safety Evaluation:  No     DME  None    Discharge Diet:      Dietary Orders (From admission, onward)     Start     Ordered    07/07/21 0001  NPO Diet    07/06/21  1921                Activity at Discharge:  As tolerated      Follow-up Appointments  Future Appointments   Date Time Provider Department Center   11/9/2021  9:45 AM Enrike Haney MD [facility]         Test Results Pending at Discharge:  None    Radha Flores, DMITRY  07/07/21  06:07 EDT

## 2021-07-07 NOTE — NURSING NOTE
"PT STATES HE'S \"FEELING BETTER\" PAIN IS AT A 0.   TALKING ON PHONE TO SPOUSE.  HR 52, 147/83, RR 20, SATS 96 ON 3L NC.    "

## 2021-07-07 NOTE — PLAN OF CARE
Problem: Adult Inpatient Plan of Care  Goal: Plan of Care Review  Outcome: Ongoing, Progressing  Flowsheets (Taken 7/7/2021 0650)  Progress: no change  Plan of Care Reviewed With: patient  Outcome Summary: From ER for chest pain. SOme chest tightness this AM -EKG and troponin being drawn. Slept well overnight. Will have echo today   Goal Outcome Evaluation:  Plan of Care Reviewed With: patient        Progress: no change  Outcome Summary: From ER for chest pain. SOme chest tightness this AM -EKG and troponin being drawn. Slept well overnight. Will have echo today

## 2021-07-07 NOTE — CASE MANAGEMENT/SOCIAL WORK
Case Management Discharge Note      Final Note: Discharged to [facility] for cardiac cath.    Provided Post Acute Provider List?: Refused  Refused Provider List Comment: Offered community resouces but patient declines the need for them at this time.    Selected Continued Care - Discharged on 7/7/2021 Admission date: 7/6/2021 - Discharge disposition: Short Term Hospital (DC - External)    Destination Coordination complete    Service Provider Selected Services Address Phone Fax Patient Preferred    [facility] [address] [place] [address] [phone] -- --          Durable Medical Equipment    No services have been selected for the patient.              Dialysis/Infusion    No services have been selected for the patient.              Home Medical Care    No services have been selected for the patient.              Therapy    No services have been selected for the patient.              Community Resources    No services have been selected for the patient.              Community & DME    No services have been selected for the patient.                       Final Discharge Disposition Code: 02 - [facility] hospital for  care

## 2021-07-07 NOTE — CONSULTS
Date of Hospital Visit: [unfilled]ENC@  Encounter Provider: Brenda Silveira MD  Place of Service: [facility] CARDIOLOGY  Patient Name: Kwabena Le Jr.  :1945  Referral Provider: No ref. provider found    Chief complaint    Chest pain    History of Present Illness    This is a 76-year-old gentleman with a history of coronary artery disease, diabetes also to, hyperlipidemia, hypertension and SVT.      He had a non-STEMI in  and received 3 drug-eluting stents, stent to second obtuse marginal which was the culprit vessel, stent to the first obtuse marginal and stent to the first diagonal.  His LAD disease was treated medically.  He had nonischemic stress nuclear perfusion study in 2020 at [facility].    He had SVT 2020 and chest pain associated with this.  He was given adenosine by EMS and converted to sinus rhythm.  He had this happen again 2021.  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  The second episode occurred right after he had COVID-19.    He presented with chest pain on 2021.  He was sitting his recliner after eating breakfast and began having chest heaviness with radiation to his shoulders and down to his elbows bilaterally.  The chest pain was left-sided described as a pressure or heaviness lasting 15 minutes.  He called his grandson who drove him to the hospital on the way he had no other episode of chest pain.  In the emergency department he was chest pain-free.  When he got admitted, he had another episode of chest pain starting at 4 PM for which she received aspirin but no nitroglycerin and this also got better.  He had another episode of chest pain this morning.  None of the episodes that he has had since arrival have been as severe as the first episode and none of them had radiation to his shoulders.  His troponins have been negative x4 glucose 198, sodium 132, otherwise normal CMP, normal TSH,  normal D-dimer, hemoglobin A1c 7.6, normal CBC, LDL 48, HDL 29.  EKG showed normal sinus rhythm, normal ECG.  Chest x-ray was negative.  His blood pressure on arrival was 182/94, he is afebrile, heart rate in the 60s with a heart rate dropping down into the low 50s and 40s.  Blood pressure did drop down into the 130s over 70s and his saturations been 90% on room air.  No SVT documented overnight on telemetry.    He said that he has had fatigue and dyspnea since having COVID-19 January 2021.  He denies fevers, chills, cough, nausea, vomiting or diarrhea.  He uses no tobacco and has rare alcohol.  Lives at home with his wife.    Past Medical History:   Diagnosis Date   • Acute gastrointestinal hemorrhage 8/10/2018   • Perez's esophagus with high grade dysplasia 9/18/2018    Added automatically from request for surgery [id number]   • BPH (benign prostatic hyperplasia)    • CAD (coronary artery disease)    • CKD (chronic kidney disease) stage 3, GFR 30-59 ml/min (CMS/HCC) 5/4/2021   • Colon polyp    • GERD (gastroesophageal reflux disease)    • History of transfusion    • Hyperlipidemia    • Hypertension    • Osteoarthritis    • PSVT (paroxysmal supraventricular tachycardia) (CMS/HCC)    • Type 2 diabetes mellitus (CMS/HCC)        Past Surgical History:   Procedure Laterality Date   • APPENDECTOMY     • BIOPSY PENILE      couple years ago was benign of prostate   • CARDIAC CATHETERIZATION      hAD FOUR STENTS   • CARDIAC SURGERY     • COLONOSCOPY      10 PLUS YEARS AGO   • COLONOSCOPY N/A 8/13/2018    Procedure: COLONOSCOPY and polypectomy;  Surgeon: Mili Iraheta MD;  Location: [facility] OR;  Service: Gastroenterology   • CYSTOSCOPY      2 years ago   • ENDOSCOPY N/A 8/11/2018    Procedure: ESOPHAGOGASTRODUODENOSCOPY w/ biopsies;  Surgeon: Mili Iraheta MD;  Location: [facility] OR;  Service: Gastroenterology   • ESOPHAGOSCOPY N/A 9/21/2018    Procedure: ESOPHAGOSCOPY WITH FOCAL RADIO FREQUENCY ABLATION;  Surgeon: Valery  Jean MUNSON MD;  Location: [facility] ENDOSCOPY;  Service: Gastroenterology   • TURP / TRANSURETHRAL INCISION / DRAINAGE PROSTATE      2 years ago       Medications Prior to Admission   Medication Sig Dispense Refill Last Dose   • allopurinol (ZYLOPRIM) 300 MG tablet allopurinol 300 mg tablet   TAKE 1 TABLET DAILY   7/5/2021 at 2100   • ASPIRIN 81 PO aspirin 81 mg tablet,delayed release   Take 1 tablet every day by oral route.   7/6/2021 at 0800   • atorvastatin (LIPITOR) 40 MG tablet atorvastatin 40 mg tablet   TAKE 1 TABLET DAILY IN THE EVENING   7/5/2021 at 2100   • carvedilol (COREG) 25 MG tablet Take 25 mg by mouth 2 (Two) Times a Day With Meals.   7/6/2021 at 0800   • dapagliflozin (Farxiga) 5 MG tablet tablet Farxiga 5 mg tablet   Take 1 tablet every day by oral route for 30 days.   7/5/2021 at 0800   • finasteride (PROSCAR) 5 MG tablet finasteride 5 mg tablet   7/6/2021 at 0800   • lisinopril (PRINIVIL,ZESTRIL) 10 MG tablet Take 10 mg by mouth Daily.   7/5/2021 at 1200   • omeprazole (priLOSEC) 40 MG capsule omeprazole 40 mg capsule,delayed release   TAKE 1 CAPSULE BY MOUTH EVERY DAY   7/6/2021 at 0800   • SITagliptin-metFORMIN HCl ER (Janumet XR)  MG tablet 1 tablet 2 (two) times a day.   7/6/2021 at 0800   • nitroglycerin (NITROSTAT) 0.4 MG SL tablet Place 1 tablet under the tongue Every 5 (Five) Minutes As Needed for Chest Pain. Take no more than 3 doses in 15 minutes. 30 tablet 1 Unknown at Unknown time       Current Meds  Scheduled Meds:allopurinol, 300 mg, Oral, Daily  aspirin, 81 mg, Oral, Daily  atorvastatin, 40 mg, Oral, Daily  carvedilol, 25 mg, Oral, BID With Meals  enoxaparin, 40 mg, Subcutaneous, Q24H  finasteride, 5 mg, Oral, Daily  insulin aspart, 0-7 Units, Subcutaneous, TID AC  lisinopril, 10 mg, Oral, Daily  pantoprazole, 40 mg, Oral, QAM  sodium chloride, 10 mL, Intravenous, Q12H      Continuous Infusions:Pharmacy to Dose enoxaparin (LOVENOX),   sodium chloride, 100 mL/hr, Last Rate:  "100 mL/hr (07/07/21 0516)      PRN Meds:.•  acetaminophen **OR** acetaminophen **OR** acetaminophen  •  dextrose  •  dextrose  •  glucagon (human recombinant)  •  melatonin  •  nitroglycerin  •  ondansetron **OR** ondansetron  •  Pharmacy to Dose enoxaparin (LOVENOX)  •  sodium chloride  •  sodium chloride    Allergies as of 07/06/2021   • (No Known Allergies)       Social History     Socioeconomic History   • Marital status:      Spouse name: Not on file   • Number of children: 2   • Years of education: Not on file   • Highest education level: Not on file   Tobacco Use   • Smoking status: Never Smoker   • Smokeless tobacco: Never Used   Vaping Use   • Vaping Use: Never used   Substance and Sexual Activity   • Alcohol use: No   • Drug use: No   • Sexual activity: Defer     Partners: Male       Family History   Problem Relation Age of Onset   • Hypertension Mother    • Arthritis Mother    • Heart disease Father    • Heart disease Sister    • Hypertension Maternal Aunt    • Hypertension Maternal Uncle    • Heart disease Maternal Grandfather    • Colon cancer Neg Hx    • Colon polyps Neg Hx        REVIEW OF SYSTEMS:   12 point ROS was performed and is negative except as outlined in HPI         Objective:   Temp:  [96.9 °F (36.1 °C)-98.3 °F (36.8 °C)] 98.3 °F (36.8 °C)  Heart Rate:  [46-64] 56  Resp:  [17-20] 17  BP: (111-182)/(63-94) 137/65  Body mass index is 29.72 kg/m².  Flowsheet Rows      First Filed Value   Admission Height  180.3 cm (71\") Documented at 07/06/2021 1405   Admission Weight  96.2 kg (212 lb) Documented at 07/06/2021 1405        Vitals:    07/07/21 0649   BP:    Pulse: 56   Resp:    Temp:    SpO2:        General Appearance:    Alert, cooperative, in no acute distress   Head:    Normocephalic, without obvious abnormality, atraumatic   Eyes:            Lids and lashes normal, conjunctivae and sclerae normal, no   icterus, no pallor, corneas clear   Ears:    Ears appear intact with no " abnormalities noted   Throat:   No oral lesions, no thrush, oral mucosa moist   Neck:   No adenopathy, supple, trachea midline, no thyromegaly, no   carotid bruit, no JVD   Back:     No kyphosis present, no scoliosis present, no skin lesions, erythema or scars, no tenderness to palpation, range of motion normal   Lungs:     Clear to auscultation,respirations regular, even and unlabored    Heart:    Regular rhythm and normal rate, normal S1 and S2, no murmur, no gallop, no rub, no click   Chest Wall:    No abnormalities observed   Abdomen:     Normal bowel sounds, no masses, no organomegaly, soft, nontender, nondistended, no guarding, no rebound  tenderness   Extremities:   Moves all extremities well, no edema, no cyanosis, no redness   Pulses:   Pulses palpable and equal bilaterally. Normal radial, carotid, femoral, dorsalis pedis and posterior tibial pulses bilaterally. Normal abdominal aorta   Skin:  Psychiatric:   No bleeding, bruising or rash    Alert and oriented x 3, normal mood and affect                 Lab Review:      Results from last 7 days   Lab Units 07/06/21  1413   SODIUM mmol/L 132*   POTASSIUM mmol/L 4.7   CHLORIDE mmol/L 100   CO2 mmol/L 21.2*   BUN mg/dL 18   CREATININE mg/dL 1.08   CALCIUM mg/dL 9.7   BILIRUBIN mg/dL 0.4   ALK PHOS U/L 63   ALT (SGPT) U/L 16   AST (SGOT) U/L 16   GLUCOSE mg/dL 198*     Results from last 7 days   Lab Units 07/07/21  0429 07/06/21  2158 07/06/21  1723   TROPONIN T ng/mL <0.010 <0.010 <0.010       Results from last 7 days   Lab Units 07/06/21  1412   WBC 10*3/mm3 8.69   HEMOGLOBIN g/dL 16.1   HEMATOCRIT % 49.7   PLATELETS 10*3/mm3 210             I personally viewed and interpreted the patient's EKG/Telemetry data  )  Patient Active Problem List   Diagnosis   • Perez's esophagus with high grade dysplasia   • CAD (coronary artery disease)   • Hyperlipidemia   • PSVT (paroxysmal supraventricular tachycardia) (CMS/HCC)   • Type 2 diabetes mellitus (CMS/HCC)   •  Hypertension   • CKD (chronic kidney disease) stage 3, GFR 30-59 ml/min (CMS/[facility])   • Chest pain     Assessment and Plan:    1. Chest pain, no ACS, similar to prior anginal chest pain.  Thought versus was SVT but his heart was not racing.  Currently he is chest pain-free.  2. CAD, h/o NSTEMI and stents in 2007.   3. History of SVT  4. Hyperlipidemia, low HDL, on atorvastatin at home.  5. Diabetes mellitus type 2  6. Hypertension    Spoke with patient about options as I am concerned he probably has coronary ischemia, will proceed with cardiac catheterization to be done at [facility].  Spoke with Dr. Willett about the plans.  Risks and benefits of procedure explained to patient and he is amenable.    Brenda Silveira MD  07/07/21  07:30 EDT.  Time spent in reviewing chart, discussion and examination:

## 2021-07-07 NOTE — CASE MANAGEMENT/SOCIAL WORK
Discharge Planning Assessment  MAKENZIE Malave     Patient Name: Kwabena Le Jr.  MRN: [id number]  Today's Date: 7/7/2021    Admit Date: 7/6/2021    Discharge Needs Assessment     Row Name 07/07/21 1038       Living Environment    Lives With  spouse    Name(s) of Who Lives With Patient  Brittany Le, wife and adult grandson    Current Living Arrangements  home/apartment/condo Bilevel home with 12 steps to get in the house, also has a ramp to gain entry for his wife    Duration at Residence  45 years    Potentially Unsafe Housing Conditions  -- no    Primary Care Provided by  self    Provides Primary Care For  spouse    Caregiving Concerns  no care giving concerns voiced by patient at this time    Family Caregiver if Needed  child(cj), adult    Family Caregiver Names  adult son and daughter    Quality of Family Relationships  unable to assess    Able to Return to Prior Arrangements  yes    Living Arrangement Comments  Pt states he lives with his wife and adult grandson in a bilevel house with 12 steps and a ramp to gain entry       Resource/Environmental Concerns    Resource/Environmental Concerns  none    Transportation Concerns  -- none       Transition Planning    Patient/Family Anticipates Transition to  home with family    Patient/Family Anticipated Services at Transition  none    Transportation Anticipated  family or friend will provide pt states that his son will be able to provide ride home at discharge       Discharge Needs Assessment    Readmission Within the Last 30 Days  no previous admission in last 30 days    Current Outpatient/Agency/Support Group  -- none    Equipment Currently Used at Home  none    Concerns to be Addressed  no discharge needs identified;denies needs/concerns at this time    Concerns Comments  no discharge needs voiced by patient at this time    Anticipated Changes Related to Illness  none    Equipment Needed After Discharge  none    Outpatient/Agency/Support Group Needs  -- pt  states he does not feel like he will need these services at discharge    Discharge Facility/Level of Care Needs  -- pt states she does not feel like he will need these services at discharge    Provided Post Acute Provider List?  Refused    Refused Provider List Comment  Offered community resources but patient declines the need for them at this time.    Patient's Choice of Community Agency(S)  none    Current Discharge Risk  -- none    Discharge Coordination/Progress  Discharge plan is patient will be discharged to [facility] for cardiac cath.        Discharge Plan     Row Name 07/07/21 1110       Plan    Plan  Discharge to [facility] for Cardiac Cath    Patient/Family in Agreement with Plan  yes    Plan Comments  Into room and introduced self and role of CM. Discussed discharge disposition with patient at bedside. Patient is currently sitting up on the side of the bed wiping his forehead and neck complaining of being hot and feels like he has a temperature. Primary nurse Bijan is at bedside and is in the process of taking patient's tempeture. Patient states he feels well enough to answer questions. Patient confirms that the info on his face sheet is correct and that he see's Dr. Charlie Woody as PCP. He states that he uses [facility] for his maintenance meds and uses [facility] pharmacy in [place] for one time scripts. He also states that he does not have a living will and declines information regarding one. Patient states that the he lives with his wife and adult grandson in a bilevel home with twelve steps to gain entry and also has a handicap ramp he uses for his wife. He states that he has no problem maneuvering the steps or within the home. He states that he is independent with his ADL's and drives, however, his son will be able to provide ride home at discharge. He also states that he currently does not use any DME at home and does not anticipate needing any equipment at discharge.  Patient states he has not used home health in the past and does not think he will need this service at discharge. CM offered community resources such as, home health, STR and LTC but patient declines the need for them at this time. Patient states he plans on returning home at discharge after his cath with his grandson to help as needed, no discharge needs voiced by patient at this time. Patient  had no other questions or concerns regarding discharge plans. Name and number placed on white board in room. CM will continue to follow for needs.        Continued Care and Services - Discharged on 7/7/2021 Admission date: 7/6/2021 - Discharge disposition: [facility] Hospital (DC - External)    Destination Coordination complete    Service Provider Request Status Selected Services Address Phone Fax Patient Preferred    [address] [address] [phone] -- --              Expected Discharge Date and Time     Expected Discharge Date Expected Discharge Time    Jul 7, 2021         Demographic Summary     Row Name 07/07/21 1038       General Information    Admission Type  observation    Arrived From  home    Referral Source  admission list    Reason for Consult  discharge planning    Preferred Language  English     Used During This Interaction  no       Contact Information    Permission Granted to Share Info With          Functional Status    No documentation.       Psychosocial    No documentation.       Abuse/Neglect    No documentation.       Legal    No documentation.       Substance Abuse    No documentation.       Patient Forms    No documentation.           Odalys Thomas, RN

## 2021-07-08 VITALS
WEIGHT: 212 LBS | TEMPERATURE: 98.1 F | RESPIRATION RATE: 16 BRPM | SYSTOLIC BLOOD PRESSURE: 135 MMHG | DIASTOLIC BLOOD PRESSURE: 72 MMHG | OXYGEN SATURATION: 92 % | HEART RATE: 59 BPM | HEIGHT: 71 IN | BODY MASS INDEX: 29.68 KG/M2

## 2021-07-08 PROBLEM — I20.0 UNSTABLE ANGINA (HCC): Status: ACTIVE | Noted: 2021-07-08

## 2021-07-08 LAB
ANION GAP SERPL CALCULATED.3IONS-SCNC: 12.1 MMOL/L (ref 5–15)
BUN SERPL-MCNC: 17 MG/DL (ref 8–23)
BUN/CREAT SERPL: 17.2 (ref 7–25)
CALCIUM SPEC-SCNC: 8.8 MG/DL (ref 8.6–10.5)
CHLORIDE SERPL-SCNC: 103 MMOL/L (ref 98–107)
CHOLEST SERPL-MCNC: 125 MG/DL (ref 0–200)
CO2 SERPL-SCNC: 20.9 MMOL/L (ref 22–29)
CREAT SERPL-MCNC: 0.99 MG/DL (ref 0.76–1.27)
DEPRECATED RDW RBC AUTO: 48.6 FL (ref 37–54)
ERYTHROCYTE [DISTWIDTH] IN BLOOD BY AUTOMATED COUNT: 14.3 % (ref 12.3–15.4)
GFR SERPL CREATININE-BSD FRML MDRD: 74 ML/MIN/1.73
GLUCOSE SERPL-MCNC: 141 MG/DL (ref 65–99)
HCT VFR BLD AUTO: 46.9 % (ref 37.5–51)
HDLC SERPL-MCNC: 30 MG/DL (ref 40–60)
HGB BLD-MCNC: 15.5 G/DL (ref 13–17.7)
LDLC SERPL CALC-MCNC: 56 MG/DL (ref 0–100)
LDLC/HDLC SERPL: 1.54 {RATIO}
MCH RBC QN AUTO: 30.6 PG (ref 26.6–33)
MCHC RBC AUTO-ENTMCNC: 33 G/DL (ref 31.5–35.7)
MCV RBC AUTO: 92.7 FL (ref 79–97)
PLATELET # BLD AUTO: 211 10*3/MM3 (ref 140–450)
PMV BLD AUTO: 10.7 FL (ref 6–12)
POTASSIUM SERPL-SCNC: 4.1 MMOL/L (ref 3.5–5.2)
QT INTERVAL: 415 MS
RBC # BLD AUTO: 5.06 10*6/MM3 (ref 4.14–5.8)
SODIUM SERPL-SCNC: 136 MMOL/L (ref 136–145)
TRIGL SERPL-MCNC: 244 MG/DL (ref 0–150)
VLDLC SERPL-MCNC: 39 MG/DL (ref 5–40)
WBC # BLD AUTO: 10.12 10*3/MM3 (ref 3.4–10.8)

## 2021-07-08 PROCEDURE — 80061 LIPID PANEL: CPT | Performed by: INTERNAL MEDICINE

## 2021-07-08 PROCEDURE — 85027 COMPLETE CBC AUTOMATED: CPT | Performed by: INTERNAL MEDICINE

## 2021-07-08 PROCEDURE — 93005 ELECTROCARDIOGRAM TRACING: CPT | Performed by: INTERNAL MEDICINE

## 2021-07-08 PROCEDURE — 99239 HOSP IP/OBS DSCHRG MGMT >30: CPT | Performed by: NURSE PRACTITIONER

## 2021-07-08 PROCEDURE — 80048 BASIC METABOLIC PNL TOTAL CA: CPT | Performed by: INTERNAL MEDICINE

## 2021-07-08 RX ORDER — LISINOPRIL 20 MG/1
20 TABLET ORAL DAILY
Qty: 90 TABLET | Refills: 3 | Status: SHIPPED | OUTPATIENT
Start: 2021-07-08

## 2021-07-08 RX ORDER — NITROGLYCERIN 0.4 MG/1
0.4 TABLET SUBLINGUAL
Qty: 25 TABLET | Refills: 1 | Status: SHIPPED | OUTPATIENT
Start: 2021-07-08

## 2021-07-08 RX ORDER — PANTOPRAZOLE SODIUM 40 MG/1
40 TABLET, DELAYED RELEASE ORAL DAILY
Qty: 30 TABLET | Refills: 11 | Status: SHIPPED | OUTPATIENT
Start: 2021-07-08 | End: 2021-11-09

## 2021-07-08 RX ORDER — CLOPIDOGREL BISULFATE 75 MG/1
75 TABLET ORAL DAILY
Qty: 90 TABLET | Refills: 3 | Status: SHIPPED | OUTPATIENT
Start: 2021-07-09 | End: 2022-05-10

## 2021-07-08 RX ADMIN — CARVEDILOL 25 MG: 25 TABLET, FILM COATED ORAL at 09:09

## 2021-07-08 RX ADMIN — LISINOPRIL 10 MG: 20 TABLET ORAL at 09:09

## 2021-07-08 RX ADMIN — CLOPIDOGREL 75 MG: 75 TABLET, FILM COATED ORAL at 09:09

## 2021-07-08 NOTE — PROGRESS NOTES
"Clinical Pharmacy Services-Post PCI Discharge Medication Education    Kwabena Le Jr. was counseled over post-PCI medications prior to discharge.  Counseling points included the followin) Clopidogrel's indication, patient's need for the medication, and dosing/frequency  2) Enforced the importance of taking clopidogrel/aspirin as instructed every day  3) Explained possible side effects of aspirin/clopidogrel therapy, including increased risk of bleeding, s/sx of bleeding, and increase in cold intolerance. Also talked about ways to control bleeding for minor cuts and scrapes.  4) Discussed all important drug interactions, including over-the-counter medications and supplements (\"G\" supplements, fish oil, green tea, [place]'s Wort, and Vitamin E).     Patient also continued on home dose of atorvastatin. Talked about patient's need for medications in light of stent placement, dosing/frequency, and importance of taking medication at night. Talked about interactions, including interaction with grapefruit juice, and using alcohol in moderation.     Explained risk for thrombosis on new stent (especially in the first 3-6 months) and medication compliance.     Patient expressed understanding and had no further questions.      Mary Schofield, Pharm.D., [facility]   Clinical Staff Pharmacist   Phone Extension #[phone]  "

## 2021-07-08 NOTE — CONSULTS
Met with patient to discuss thebenefits of cardiac rehab. Patient is familiar with the program, stated attended following a heart attack. Provided phase II information along with the contact information for cardiac rehab here at [facility]. Patient requested that the referral be sent there.

## 2021-07-08 NOTE — PROGRESS NOTES
[facility] Clinical Pharmacy Services: National Cardiology Data Registry (NCDR) Medication Review    Kwabena Le Jr. is s/p PCI with drug-eluting stent placement for unstable angina. Pharmacy to review discharge medications to make sure appropriate medications have been prescribed.    Patient has been discharged on the following:  · P2Y12 Inhibitor: Clopidogrel 75 mg daily  · Aspirin 81 mg daily  · High Intensity Statin: Atorvastatin 4 mg qHS  · Beta-blocker: Carvedilol 25 mg BID  · LVEF <40: Lisinopril 20 mg daily    These medications meet the requirements for NCDR discharge medication for chest pain and MI.    Mary Schofield, Pharm.D., [facility]  Clinical Staff Pharmacist  Phone Extension #[phone]

## 2021-07-08 NOTE — DISCHARGE SUMMARY
Patient Name: Kwabena Le Jr.  :1945  76 y.o.    Date of Admit: 2021  Date of Discharge:  2021    Discharge Diagnosis:  Problems Addressed this Visit        Cardiac and Vasculature    * (Principal) Chest pain    Relevant Orders    Cardiac Catheterization/Vascular Study (Completed)    Unstable angina (CMS/HCC) - Primary    Relevant Medications    nitroglycerin (NITROSTAT) 0.4 MG SL tablet    clopidogrel (PLAVIX) 75 MG tablet (Start on 2021)    Other Relevant Orders    Ambulatory Referral to Cardiac Rehab      Diagnoses       Codes Comments    Unstable angina (CMS/HCC)    -  Primary ICD-10-CM: I20.0  ICD-9-CM: 411.1     Chest pain, unspecified type     ICD-10-CM: R07.9  ICD-9-CM: 786.50         1.  Unstable angina  2.  History of coronary disease with stents.  Status post drug-eluting stent to the LAD with patent stent in the OM, normal RCA normal left main.  3.  Ischemic cardiomyopathy  4.  Chronic systolic heart failure  5.  History of SVT  6.  Dyslipidemia with low HDL on Lipitor  7.  Essential hypertension  8.  Diabetes mellitus.      Hospital Course:   The patient is a 76-year-old male who is a previous patient of Dr. Haney has a history of coronary disease with stent to the first OM and non-STEMI in .  He has been having some discomfort in the chest that was similar to what he has had in the past and became more severe the evening of .  He decided to come to the hospital where his symptoms were concerning for coronary ischemia and we referred him for cardiac cath.  Coronary anatomy revealed a severe lesion in the mid LAD of 99%, there is a patent stent in the OM and the first diagonal.  There is patent right coronary patent left main.  His EF was down about 35 to 40% with some anterior wall hypokinesis.  Patient underwent percutaneous intervention with a 3.0 x 15 mm Xience Nicolasa drug-eluting stent.  He was started on 81 aspirin and Plavix.  He had no postprocedure complications.   His blood pressures been a little high and I increased his lisinopril to 20 mg today.  He has done well his right radial cath site is stable without evidence of hematoma he has some mild bruising.  I discussed with him the need for dual antiplatelet therapy uninterrupted for a year I also went over his instructions and is restrictions.  He will follow-up in the office with [facility] in [place] in a week and Dr. Haney in [place] in a month.    Procedures Performed  Procedure(s):  Left Heart Cath  Coronary angiography  Left ventriculography  Stent KASSY coronary       Consults     Date and Time Order Name Status Description    7/6/2021  7:21 PM Inpatient Cardiology Consult Completed           Pertinent Test Results:   Results from last 7 days   Lab Units 07/08/21  0533 07/06/21  1413   SODIUM mmol/L 136 132*   POTASSIUM mmol/L 4.1 4.7   CHLORIDE mmol/L 103 100   CO2 mmol/L 20.9* 21.2*   BUN mg/dL 17 18   CREATININE mg/dL 0.99 1.08   CALCIUM mg/dL 8.8 9.7   BILIRUBIN mg/dL  --  0.4   ALK PHOS U/L  --  63   ALT (SGPT) U/L  --  16   AST (SGOT) U/L  --  16   GLUCOSE mg/dL 141* 198*     Results from last 7 days   Lab Units 07/07/21  0744 07/07/21  0429 07/06/21  2158   TROPONIN T ng/mL <0.010 <0.010 <0.010     @LABRCNT(bnp)@  Results from last 7 days   Lab Units 07/08/21  0533   WBC 10*3/mm3 10.12   HEMOGLOBIN g/dL 15.5   HEMATOCRIT % 46.9   PLATELETS 10*3/mm3 211             Results from last 7 days   Lab Units 07/08/21  0533   CHOLESTEROL mg/dL 125   TRIGLYCERIDES mg/dL 244*   HDL CHOL mg/dL 30*   LDL CHOL mg/dL 56       Condition on Discharge: stable    Discharge Medications     Discharge Medications      New Medications      Instructions Start Date   clopidogrel 75 MG tablet  Commonly known as: PLAVIX   75 mg, Oral, Daily   Start Date: July 9, 2021     pantoprazole 40 MG EC tablet  Commonly known as: Protonix   40 mg, Oral, Daily         Changes to Medications      Instructions Start Date   lisinopril 20 MG tablet  Commonly  known as: JESÚS STATONSTRIL  What changed:   · medication strength  · how much to take   20 mg, Oral, Daily         Continue These Medications      Instructions Start Date   allopurinol 300 MG tablet  Commonly known as: ZYLOPRIM   allopurinol 300 mg tablet   TAKE 1 TABLET DAILY      ASPIRIN 81 PO   aspirin 81 mg tablet,delayed release   Take 1 tablet every day by oral route.      atorvastatin 40 MG tablet  Commonly known as: LIPITOR   atorvastatin 40 mg tablet   TAKE 1 TABLET DAILY IN THE EVENING      carvedilol 25 MG tablet  Commonly known as: COREG   25 mg, Oral, 2 Times Daily With Meals      Farxiga 5 MG tablet tablet  Generic drug: dapagliflozin   Farxiga 5 mg tablet   Take 1 tablet every day by oral route for 30 days.      finasteride 5 MG tablet  Commonly known as: PROSCAR   finasteride 5 mg tablet      Janumet XR  MG tablet  Generic drug: SITagliptin-metFORMIN HCl ER   1 tablet, 2 times daily      nitroglycerin 0.4 MG SL tablet  Commonly known as: NITROSTAT   0.4 mg, Sublingual, Every 5 Minutes PRN, Take no more than 3 doses in 15 minutes.         Stop These Medications    omeprazole 40 MG capsule  Commonly known as: priLOSEC            Discharge Diet:     Activity at Discharge:     Discharge disposition: home    Follow-up Appointments  Future Appointments   Date Time Provider Department Center   11/9/2021  9:45 AM Enrike Haney MD [facility]     Additional Instructions for the Follow-ups that You Need to Schedule     Ambulatory Referral to Cardiac Rehab   As directed            Test Results Pending at Discharge       DMITRY Lala, [facility]  07/08/21  10:35 EDT    Time: Discharge 35 min  Electronically signed by DMITRY Lala, 07/08/21, 10:35 AM EDT.

## 2021-07-08 NOTE — PLAN OF CARE
Goal Outcome Evaluation:  Plan of Care Reviewed With: patient        Progress: improving  Outcome Summary: outcome goal met

## 2021-07-08 NOTE — PLAN OF CARE
Goal Outcome Evaluation:    VSS. Pt is s/p KASSY x1 to LAD. Rt radial site C/D/I without issue. Pt with no c/o this shift. WCTM closely; pt should d/c home this AM.

## 2021-07-08 NOTE — DISCHARGE INSTRUCTIONS
NO lifting over 5lbs for a week  NO driving for 48 hours  NO pushing or pulling  Monitor for swelling or pain to right wrist

## 2021-07-09 LAB
ACT BLD: 230 SECONDS (ref 82–152)
ACT BLD: 257 SECONDS (ref 82–152)

## 2021-07-14 ENCOUNTER — TREATMENT (OUTPATIENT)
Dept: CARDIAC REHAB | Facility: HOSPITAL | Age: 76
End: 2021-07-14

## 2021-07-14 DIAGNOSIS — Z95.5 S/P DRUG ELUTING CORONARY STENT PLACEMENT: Primary | ICD-10-CM

## 2021-07-14 LAB — GLUCOSE BLDC GLUCOMTR-MCNC: 192 MG/DL (ref 70–130)

## 2021-07-14 PROCEDURE — 93797 PHYS/QHP OP CAR RHAB WO ECG: CPT

## 2021-07-14 PROCEDURE — 93798 PHYS/QHP OP CAR RHAB W/ECG: CPT

## 2021-07-14 PROCEDURE — 82962 GLUCOSE BLOOD TEST: CPT

## 2021-07-15 ENCOUNTER — OFFICE VISIT (OUTPATIENT)
Dept: CARDIOLOGY | Facility: CLINIC | Age: 76
End: 2021-07-15

## 2021-07-15 VITALS
BODY MASS INDEX: 29.26 KG/M2 | RESPIRATION RATE: 16 BRPM | HEART RATE: 69 BPM | SYSTOLIC BLOOD PRESSURE: 120 MMHG | OXYGEN SATURATION: 96 % | DIASTOLIC BLOOD PRESSURE: 70 MMHG | WEIGHT: 209 LBS | HEIGHT: 71 IN

## 2021-07-15 DIAGNOSIS — E78.2 MIXED HYPERLIPIDEMIA: ICD-10-CM

## 2021-07-15 DIAGNOSIS — I47.1 PSVT (PAROXYSMAL SUPRAVENTRICULAR TACHYCARDIA) (HCC): ICD-10-CM

## 2021-07-15 DIAGNOSIS — I10 ESSENTIAL HYPERTENSION: ICD-10-CM

## 2021-07-15 DIAGNOSIS — I25.10 CORONARY ARTERY DISEASE INVOLVING NATIVE CORONARY ARTERY OF NATIVE HEART WITHOUT ANGINA PECTORIS: Primary | ICD-10-CM

## 2021-07-15 PROCEDURE — 93000 ELECTROCARDIOGRAM COMPLETE: CPT | Performed by: NURSE PRACTITIONER

## 2021-07-15 PROCEDURE — 99214 OFFICE O/P EST MOD 30 MIN: CPT | Performed by: NURSE PRACTITIONER

## 2021-07-15 NOTE — PROGRESS NOTES
Date of Office Visit: 07/15/2021  Encounter Provider: DMITRY Tanner  Place of Service: [facility] CARDIOLOGY  Patient Name: Kwabena Le Jr.  :1945  Primary Cardiologist: Dr. Haney    CC:  2 week follow up    Dear Dr. Arreguin    HPI: Kwabena Le Jr. is a pleasant 76 y.o. male who presents 07/15/2021 for cardiac follow up. He is a new patient to me and I have reviewed his past medical records.  He is a patient of Dr. Haney.     He suffered a non-ST elevation myocardial infarction in . He had classic anginal symptoms (heavy chest pressure, bilateral arm discomfort, dyspnea, diaphoresis). I do not have the actual cardiac cath report; I have a synopsis of it. Thankfully, he still has his stent cards.  It states that the culprit vessel was the second obtuse marginal, which received two drug-eluting stents (Cypher 2.5x28 and 2.6x13mm).  He also underwent drug-eluting stent placement to the first obtuse marginal ( MX2 3.5x24mm) and the first diagonal ( MX2 3.5x18mm) at that time.  It stated that he had apical LAD disease which was treated medically.  He had a normal perfusion stress test in 2020.     He has a history of SVT.  He presented to the ED in 2020 with chest tightness and a rapid heart rate.  I do not have any tracings of the tachycardia, but his heart rate was reported to be 190 bpm.  He was given adenosine by EMS and converted to sinus.  He presented again in 2021 with the same.  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 Of note, the second episode occurred right after he had a relatively mild case of COVID-19.     He presented with chest pain on 2021.  He was sitting his recliner after eating breakfast and began having chest heaviness with radiation to his shoulders and down to his elbows bilaterally.  The chest pain was left-sided described as a pressure or heaviness lasting 15 minutes.   He called his grandson who drove him to the hospital on the way he had no other episode of chest pain.  In the emergency department he was chest pain-free.  When he got admitted, he had another episode of chest pain starting at 4 PM for which he received aspirin but no nitroglycerin and this also got better.  He had another episode of chest pain.   None of the episodes that he has had since arrival have been as severe as the first episode and none of them had radiation to his shoulders.  His troponins have been negative x4 glucose 198, sodium 132, otherwise normal CMP, normal TSH, normal D-dimer, hemoglobin A1c 7.6, normal CBC, LDL 48, HDL 29.  EKG showed normal sinus rhythm, normal ECG.  Chest x-ray was negative.  His blood pressure on arrival was 182/94, he is afebrile, heart rate in the 60s with a heart rate dropping down into the low 50s and 40s.  Blood pressure did drop down into the 130s over 70s and his saturations been 90% on room air.  No SVT documented overnight on telemetry.  He was transferred to [facility] for heart cath.    7/7/21 Cardiac cath - Conclusion     · Severe mid LAD 99% lesion s/p PCI using 3.0x15mm Nicolasa KASSY  · Patent OM and first diagonal disease.  · Moderate LV dysfunction, EF 35-40% with anterior wall hypokinesis  · GDMT and aggressive risk factor modification      Patient underwent percutaneous intervention with a 3.0 x 15 mm Xience Nicolasa drug-eluting stent.  He was started on 81 aspirin and Plavix.  He had no postprocedure complications.  His blood pressures been a little high and and his was increased to lisinopril to 20 mg daily.    He states he is feeling well and has not had any more episodes of chest pain.  He attended his first cardiac rehab yesterday.  He denies any palpitations, shortness of breath, lower extremity edema.  He denies any dizziness or lightheadedness, chest pain or chest pressure.  He denies fatigue.  Take his medications as directed.  His blood  pressure is well controlled.  His right wrist cath site with mild bruising, good radial pulse and brisk capillary refill.    Past Medical History:   Diagnosis Date   • Acute gastrointestinal hemorrhage 8/10/2018   • Perez's esophagus with high grade dysplasia 9/18/2018    Added automatically from request for surgery [id number]   • BPH (benign prostatic hyperplasia)    • CAD (coronary artery disease)    • CKD (chronic kidney disease) stage 3, GFR 30-59 ml/min (CMS/[facility]) 5/4/2021   • Colon polyp    • GERD (gastroesophageal reflux disease)    • History of transfusion    • Hyperlipidemia    • Hypertension    • Osteoarthritis    • PSVT (paroxysmal supraventricular tachycardia) (CMS/[facility])    • Type 2 diabetes mellitus (CMS/[facility])        Past Surgical History:   Procedure Laterality Date   • APPENDECTOMY     • BIOPSY PENILE      couple years ago was benign of prostate   • CARDIAC CATHETERIZATION      hAD FOUR STENTS   • CARDIAC CATHETERIZATION N/A 7/7/2021    Procedure: Left Heart Cath;  Surgeon: Trinidad Cummings MD;  Location: [facility] CATH INVASIVE LOCATION;  Service: Cardiology;  Laterality: N/A;   • CARDIAC CATHETERIZATION N/A 7/7/2021    Procedure: Coronary angiography;  Surgeon: Trinidad Cummings MD;  Location:  [facility] CATH INVASIVE LOCATION;  Service: Cardiology;  Laterality: N/A;   • CARDIAC CATHETERIZATION N/A 7/7/2021    Procedure: Left ventriculography;  Surgeon: Trinidad Cummings MD;  Location:  [facility] CATH INVASIVE LOCATION;  Service: Cardiology;  Laterality: N/A;   • CARDIAC CATHETERIZATION N/A 7/7/2021    Procedure: Stent KASSY coronary;  Surgeon: Trinidad Cummings MD;  Location:  [facility] CATH INVASIVE LOCATION;  Service: Cardiology;  Laterality: N/A;   • CARDIAC SURGERY     • COLONOSCOPY      10 PLUS YEARS AGO   • COLONOSCOPY N/A 8/13/2018    Procedure: COLONOSCOPY and polypectomy;  Surgeon: Mili Iraheta MD;  Location: [facility];  Service: Gastroenterology   • CYSTOSCOPY      2 years ago   • ENDOSCOPY N/A 8/11/2018     Procedure: ESOPHAGOGASTRODUODENOSCOPY w/ biopsies;  Surgeon: Mili Iraheta MD;  Location:  [facility];  Service: Gastroenterology   • ESOPHAGOSCOPY N/A 9/21/2018    Procedure: ESOPHAGOSCOPY WITH FOCAL RADIO FREQUENCY ABLATION;  Surgeon: Jean Rasmussen MD;  Location:  [facility];  Service: Gastroenterology   • TURP / TRANSURETHRAL INCISION / DRAINAGE PROSTATE      2 years ago       Social History     Socioeconomic History   • Marital status:      Spouse name: Not on file   • Number of children: 2   • Years of education: Not on file   • Highest education level: Not on file   Tobacco Use   • Smoking status: Never Smoker   • Smokeless tobacco: Never Used   Vaping Use   • Vaping Use: Never used   Substance and Sexual Activity   • Alcohol use: No   • Drug use: No   • Sexual activity: Defer     Partners: Male       Family History   Problem Relation Age of Onset   • Hypertension Mother    • Arthritis Mother    • Heart disease Father    • Heart disease Sister    • Hypertension Maternal Aunt    • Hypertension Maternal Uncle    • Heart disease Maternal Grandfather    • Colon cancer Neg Hx    • Colon polyps Neg Hx        The following portion of the patient's history were reviewed and updated as appropriate: past medical history, past surgical history, past social history, past family history, allergies, current medications, and problem list.    Review of Systems   Constitutional: Negative for diaphoresis, fever and malaise/fatigue.   HENT: Negative for congestion, hearing loss, hoarse voice, nosebleeds and sore throat.    Eyes: Negative for photophobia, vision loss in left eye, vision loss in right eye and visual disturbance.   Cardiovascular: Negative for chest pain, dyspnea on exertion, irregular heartbeat, leg swelling, near-syncope, orthopnea, palpitations, paroxysmal nocturnal dyspnea and syncope.   Respiratory: Negative for cough, hemoptysis, shortness of breath, sleep disturbances due to breathing,  snoring, sputum production and wheezing.    Endocrine: Negative for cold intolerance, heat intolerance, polydipsia, polyphagia and polyuria.   Hematologic/Lymphatic: Negative for bleeding problem. Does not bruise/bleed easily.   Skin: Negative for color change, dry skin, poor wound healing, rash and suspicious lesions.   Musculoskeletal: Negative for arthritis, back pain, falls, gout, joint pain, joint swelling, muscle cramps, muscle weakness and myalgias.   Gastrointestinal: Negative for bloating, abdominal pain, constipation, diarrhea, dysphagia, melena, nausea and vomiting.   Neurological: Negative for excessive daytime sleepiness, dizziness, headaches, light-headedness, loss of balance, numbness, paresthesias, seizures, vertigo and weakness.   Psychiatric/Behavioral: Negative for depression, memory loss and substance abuse. The patient is not nervous/anxious.        No Known Allergies      Current Outpatient Medications:   •  allopurinol (ZYLOPRIM) 300 MG tablet, allopurinol 300 mg tablet  TAKE 1 TABLET DAILY, Disp: , Rfl:   •  ASPIRIN 81 PO, aspirin 81 mg tablet,delayed release  Take 1 tablet every day by oral route., Disp: , Rfl:   •  atorvastatin (LIPITOR) 40 MG tablet, atorvastatin 40 mg tablet  TAKE 1 TABLET DAILY IN THE EVENING, Disp: , Rfl:   •  carvedilol (COREG) 25 MG tablet, Take 25 mg by mouth 2 (Two) Times a Day With Meals., Disp: , Rfl:   •  clopidogrel (PLAVIX) 75 MG tablet, Take 1 tablet by mouth Daily., Disp: 90 tablet, Rfl: 3  •  dapagliflozin (Farxiga) 5 MG tablet tablet, Farxiga 5 mg tablet  Take 1 tablet every day by oral route for 30 days., Disp: , Rfl:   •  finasteride (PROSCAR) 5 MG tablet, finasteride 5 mg tablet, Disp: , Rfl:   •  lisinopril (PRINIVIL,ZESTRIL) 20 MG tablet, Take 1 tablet by mouth Daily., Disp: 90 tablet, Rfl: 3  •  nitroglycerin (NITROSTAT) 0.4 MG SL tablet, Place 1 tablet under the tongue Every 5 (Five) Minutes As Needed for Chest Pain. Take no more than 3 doses in 15  minutes., Disp: 25 tablet, Rfl: 1  •  pantoprazole (Protonix) 40 MG EC tablet, Take 1 tablet by mouth Daily., Disp: 30 tablet, Rfl: 11  •  SITagliptin-metFORMIN HCl ER (Janumet XR)  MG tablet, 1 tablet 2 (two) times a day., Disp: , Rfl:         Objective:     There were no vitals filed for this visit.  There is no height or weight on file to calculate BMI.      Vitals reviewed.   Constitutional:       General: Not in acute distress.     Appearance: Healthy appearance. Well-developed.   Eyes:      General:         Right eye: No discharge.         Left eye: No discharge.      Conjunctiva/sclera: Conjunctivae normal.   HENT:      Head: Normocephalic and atraumatic.      Right Ear: External ear normal.      Left Ear: External ear normal.      Nose: Nose normal.   Neck:      Thyroid: No thyromegaly.      Vascular: No JVD.      Trachea: No tracheal deviation.      Lymphadenopathy: No cervical adenopathy.   Pulmonary:      Effort: Pulmonary effort is normal. No respiratory distress.      Breath sounds: Normal breath sounds. No wheezing. No rales.   Chest:      Chest wall: Not tender to palpatation.   Cardiovascular:      Normal rate. Regular rhythm.      No gallop.   Pulses:     Intact distal pulses.   Edema:     Peripheral edema absent.   Abdominal:      General: There is no distension.      Palpations: Abdomen is soft.      Tenderness: There is no abdominal tenderness.   Musculoskeletal: Normal range of motion.         General: No tenderness or deformity.      Cervical back: Normal range of motion and neck supple. Skin:     General: Skin is warm and dry.      Findings: No erythema or rash.   Neurological:      Mental Status: Alert and oriented to person, place, and time.      Coordination: Coordination normal.   Psychiatric:         Attention and Perception: Attention normal.         Mood and Affect: Mood normal.         Speech: Speech normal.         Behavior: Behavior normal.         Thought Content: Thought  content normal.         Cognition and Memory: Cognition normal.         Judgment: Judgment normal.               ECG 12 Lead    Date/Time: 7/15/2021 1:32 PM  Performed by: Kristine Browning APRN  Authorized by: Kristine Browning APRN   Comparison: compared with previous ECG from 7/8/2021  Similar to previous ECG  Rhythm: sinus rhythm  Ectopy: unifocal PVCs and atrial premature contractions  Rate: normal  Conduction: left posterior fascicular block  Q waves: III and aVF    ST Segments: ST segments normal  T inversion: III  T flattening: II, aVF and V1  QRS axis: indeterminate    Clinical impression: abnormal EKG              Assessment:       Diagnosis Plan   1. Coronary artery disease involving native coronary artery of native heart without angina pectoris     2. PSVT (paroxysmal supraventricular tachycardia) (CMS/HCC)     3. Essential hypertension     4. Mixed hyperlipidemia            Plan:     · 1/2. Coronary Artery Disease -  7/7/2021 s/p Severe mid LAD 99% lesion s/p PCI using 3.0x15mm Nicolasa KASSY. Patent OM and first diagonal disease.  Assessment  • The patient has no angina     Subjective - Objective  • There has been a previous stent procedure using KASSY 2007  • Current antiplatelet therapy includes aspirin 81 mg and Plavix 75 mg daily  - already attending cardiac rehab    He had a normal perfusion stress test in March 2020.    2.. PVST -   He has a history of PSVT, likely AVNRT.  He's had two episodes in the last 1.5 years. Dr. Haney has given him education on Valsalva maneuvers today.  If he has recurrences while on high dose carvedilol, we may have to consider EPS/ablation.     3.  HTN = well controlled.  He brought his home BP cuff for comparison and it was running about 20 mmHg high than manual.    4.  Hyperlipidemia -   He is on atorvastatin and Dr Arreguin follows his lipids.    RTO in 4-6 weeks with JK    As always, it has been a pleasure to participate in your patient's care. Thank you.       Sincerely,        DMITRY Tanner      Current Outpatient Medications:   •  allopurinol (ZYLOPRIM) 300 MG tablet, allopurinol 300 mg tablet  TAKE 1 TABLET DAILY, Disp: , Rfl:   •  ASPIRIN 81 PO, aspirin 81 mg tablet,delayed release  Take 1 tablet every day by oral route., Disp: , Rfl:   •  atorvastatin (LIPITOR) 40 MG tablet, atorvastatin 40 mg tablet  TAKE 1 TABLET DAILY IN THE EVENING, Disp: , Rfl:   •  carvedilol (COREG) 25 MG tablet, Take 25 mg by mouth 2 (Two) Times a Day With Meals., Disp: , Rfl:   •  clopidogrel (PLAVIX) 75 MG tablet, Take 1 tablet by mouth Daily., Disp: 90 tablet, Rfl: 3  •  dapagliflozin (Farxiga) 5 MG tablet tablet, Farxiga 5 mg tablet  Take 1 tablet every day by oral route for 30 days., Disp: , Rfl:   •  finasteride (PROSCAR) 5 MG tablet, finasteride 5 mg tablet, Disp: , Rfl:   •  lisinopril (PRINIVIL,ZESTRIL) 20 MG tablet, Take 1 tablet by mouth Daily., Disp: 90 tablet, Rfl: 3  •  nitroglycerin (NITROSTAT) 0.4 MG SL tablet, Place 1 tablet under the tongue Every 5 (Five) Minutes As Needed for Chest Pain. Take no more than 3 doses in 15 minutes., Disp: 25 tablet, Rfl: 1  •  pantoprazole (Protonix) 40 MG EC tablet, Take 1 tablet by mouth Daily., Disp: 30 tablet, Rfl: 11  •  SITagliptin-metFORMIN HCl ER (Janumet XR)  MG tablet, 1 tablet 2 (two) times a day., Disp: , Rfl:     Dictated utilizing Dragon dictation

## 2021-07-16 ENCOUNTER — TREATMENT (OUTPATIENT)
Dept: CARDIAC REHAB | Facility: HOSPITAL | Age: 76
End: 2021-07-16

## 2021-07-16 DIAGNOSIS — Z95.5 S/P DRUG ELUTING CORONARY STENT PLACEMENT: Primary | ICD-10-CM

## 2021-07-16 LAB — GLUCOSE BLDC GLUCOMTR-MCNC: 150 MG/DL (ref 70–130)

## 2021-07-16 PROCEDURE — 93798 PHYS/QHP OP CAR RHAB W/ECG: CPT

## 2021-07-16 PROCEDURE — 82962 GLUCOSE BLOOD TEST: CPT

## 2021-07-19 ENCOUNTER — TREATMENT (OUTPATIENT)
Dept: CARDIAC REHAB | Facility: HOSPITAL | Age: 76
End: 2021-07-19

## 2021-07-19 DIAGNOSIS — Z95.5 S/P DRUG ELUTING CORONARY STENT PLACEMENT: Primary | ICD-10-CM

## 2021-07-19 LAB — GLUCOSE BLDC GLUCOMTR-MCNC: 128 MG/DL (ref 70–130)

## 2021-07-19 PROCEDURE — 82962 GLUCOSE BLOOD TEST: CPT

## 2021-07-19 PROCEDURE — 93798 PHYS/QHP OP CAR RHAB W/ECG: CPT

## 2021-07-21 ENCOUNTER — TREATMENT (OUTPATIENT)
Dept: CARDIAC REHAB | Facility: HOSPITAL | Age: 76
End: 2021-07-21

## 2021-07-21 DIAGNOSIS — Z95.5 S/P DRUG ELUTING CORONARY STENT PLACEMENT: Primary | ICD-10-CM

## 2021-07-21 LAB — GLUCOSE BLDC GLUCOMTR-MCNC: 164 MG/DL (ref 70–130)

## 2021-07-21 PROCEDURE — 82962 GLUCOSE BLOOD TEST: CPT

## 2021-07-21 PROCEDURE — 93798 PHYS/QHP OP CAR RHAB W/ECG: CPT

## 2021-07-26 ENCOUNTER — TREATMENT (OUTPATIENT)
Dept: CARDIAC REHAB | Facility: HOSPITAL | Age: 76
End: 2021-07-26

## 2021-07-26 DIAGNOSIS — Z95.5 S/P DRUG ELUTING CORONARY STENT PLACEMENT: Primary | ICD-10-CM

## 2021-07-26 LAB — GLUCOSE BLDC GLUCOMTR-MCNC: 156 MG/DL (ref 70–130)

## 2021-07-26 PROCEDURE — 82962 GLUCOSE BLOOD TEST: CPT

## 2021-07-26 PROCEDURE — 93798 PHYS/QHP OP CAR RHAB W/ECG: CPT

## 2021-07-28 ENCOUNTER — TREATMENT (OUTPATIENT)
Dept: CARDIAC REHAB | Facility: HOSPITAL | Age: 76
End: 2021-07-28

## 2021-07-28 DIAGNOSIS — Z95.5 S/P DRUG ELUTING CORONARY STENT PLACEMENT: Primary | ICD-10-CM

## 2021-07-28 LAB — GLUCOSE BLDC GLUCOMTR-MCNC: 161 MG/DL (ref 70–130)

## 2021-07-28 PROCEDURE — 82962 GLUCOSE BLOOD TEST: CPT

## 2021-07-28 PROCEDURE — 93798 PHYS/QHP OP CAR RHAB W/ECG: CPT

## 2021-07-30 ENCOUNTER — TREATMENT (OUTPATIENT)
Dept: CARDIAC REHAB | Facility: HOSPITAL | Age: 76
End: 2021-07-30

## 2021-07-30 DIAGNOSIS — Z95.5 S/P DRUG ELUTING CORONARY STENT PLACEMENT: Primary | ICD-10-CM

## 2021-07-30 PROCEDURE — 93798 PHYS/QHP OP CAR RHAB W/ECG: CPT

## 2021-08-02 ENCOUNTER — TREATMENT (OUTPATIENT)
Dept: CARDIAC REHAB | Facility: HOSPITAL | Age: 76
End: 2021-08-02

## 2021-08-02 DIAGNOSIS — Z95.5 S/P DRUG ELUTING CORONARY STENT PLACEMENT: Primary | ICD-10-CM

## 2021-08-02 PROCEDURE — 93798 PHYS/QHP OP CAR RHAB W/ECG: CPT

## 2021-08-04 ENCOUNTER — TREATMENT (OUTPATIENT)
Dept: CARDIAC REHAB | Facility: HOSPITAL | Age: 76
End: 2021-08-04

## 2021-08-04 DIAGNOSIS — Z95.5 S/P DRUG ELUTING CORONARY STENT PLACEMENT: Primary | ICD-10-CM

## 2021-08-04 PROCEDURE — 93798 PHYS/QHP OP CAR RHAB W/ECG: CPT

## 2021-08-06 ENCOUNTER — TREATMENT (OUTPATIENT)
Dept: CARDIAC REHAB | Facility: HOSPITAL | Age: 76
End: 2021-08-06

## 2021-08-06 DIAGNOSIS — Z95.5 S/P DRUG ELUTING CORONARY STENT PLACEMENT: Primary | ICD-10-CM

## 2021-08-06 PROCEDURE — 93798 PHYS/QHP OP CAR RHAB W/ECG: CPT

## 2021-08-13 ENCOUNTER — TREATMENT (OUTPATIENT)
Dept: CARDIAC REHAB | Facility: HOSPITAL | Age: 76
End: 2021-08-13

## 2021-08-13 DIAGNOSIS — Z95.5 S/P DRUG ELUTING CORONARY STENT PLACEMENT: Primary | ICD-10-CM

## 2021-08-13 PROCEDURE — 93798 PHYS/QHP OP CAR RHAB W/ECG: CPT

## 2021-08-16 ENCOUNTER — TREATMENT (OUTPATIENT)
Dept: CARDIAC REHAB | Facility: HOSPITAL | Age: 76
End: 2021-08-16

## 2021-08-16 DIAGNOSIS — Z95.5 S/P DRUG ELUTING CORONARY STENT PLACEMENT: Primary | ICD-10-CM

## 2021-08-16 PROCEDURE — 93798 PHYS/QHP OP CAR RHAB W/ECG: CPT

## 2021-08-18 ENCOUNTER — TREATMENT (OUTPATIENT)
Dept: CARDIAC REHAB | Facility: HOSPITAL | Age: 76
End: 2021-08-18

## 2021-08-18 DIAGNOSIS — Z95.5 S/P DRUG ELUTING CORONARY STENT PLACEMENT: Primary | ICD-10-CM

## 2021-08-18 PROCEDURE — 93798 PHYS/QHP OP CAR RHAB W/ECG: CPT

## 2021-08-20 ENCOUNTER — TREATMENT (OUTPATIENT)
Dept: CARDIAC REHAB | Facility: HOSPITAL | Age: 76
End: 2021-08-20

## 2021-08-20 DIAGNOSIS — Z95.5 S/P DRUG ELUTING CORONARY STENT PLACEMENT: Primary | ICD-10-CM

## 2021-08-20 PROCEDURE — 93798 PHYS/QHP OP CAR RHAB W/ECG: CPT

## 2021-08-23 ENCOUNTER — TREATMENT (OUTPATIENT)
Dept: CARDIAC REHAB | Facility: HOSPITAL | Age: 76
End: 2021-08-23

## 2021-08-23 DIAGNOSIS — Z95.5 S/P DRUG ELUTING CORONARY STENT PLACEMENT: Primary | ICD-10-CM

## 2021-08-23 PROCEDURE — 93798 PHYS/QHP OP CAR RHAB W/ECG: CPT

## 2021-08-25 ENCOUNTER — TREATMENT (OUTPATIENT)
Dept: CARDIAC REHAB | Facility: HOSPITAL | Age: 76
End: 2021-08-25

## 2021-08-25 DIAGNOSIS — Z95.5 S/P DRUG ELUTING CORONARY STENT PLACEMENT: Primary | ICD-10-CM

## 2021-08-25 PROCEDURE — 93798 PHYS/QHP OP CAR RHAB W/ECG: CPT

## 2021-08-30 ENCOUNTER — TREATMENT (OUTPATIENT)
Dept: CARDIAC REHAB | Facility: HOSPITAL | Age: 76
End: 2021-08-30

## 2021-08-30 ENCOUNTER — OFFICE VISIT (OUTPATIENT)
Dept: CARDIAC REHAB | Facility: HOSPITAL | Age: 76
End: 2021-08-30

## 2021-08-30 DIAGNOSIS — Z95.5 S/P DRUG ELUTING CORONARY STENT PLACEMENT: Primary | ICD-10-CM

## 2021-08-30 PROCEDURE — 93797 PHYS/QHP OP CAR RHAB WO ECG: CPT

## 2021-08-30 PROCEDURE — 93798 PHYS/QHP OP CAR RHAB W/ECG: CPT

## 2021-09-01 ENCOUNTER — TREATMENT (OUTPATIENT)
Dept: CARDIAC REHAB | Facility: HOSPITAL | Age: 76
End: 2021-09-01

## 2021-09-01 DIAGNOSIS — Z95.5 S/P DRUG ELUTING CORONARY STENT PLACEMENT: Primary | ICD-10-CM

## 2021-09-01 PROCEDURE — 93798 PHYS/QHP OP CAR RHAB W/ECG: CPT

## 2021-09-03 ENCOUNTER — TREATMENT (OUTPATIENT)
Dept: CARDIAC REHAB | Facility: HOSPITAL | Age: 76
End: 2021-09-03

## 2021-09-03 DIAGNOSIS — Z95.5 S/P DRUG ELUTING CORONARY STENT PLACEMENT: Primary | ICD-10-CM

## 2021-09-03 PROCEDURE — 93798 PHYS/QHP OP CAR RHAB W/ECG: CPT

## 2021-09-08 ENCOUNTER — APPOINTMENT (OUTPATIENT)
Dept: CARDIAC REHAB | Facility: HOSPITAL | Age: 76
End: 2021-09-08

## 2021-09-10 ENCOUNTER — APPOINTMENT (OUTPATIENT)
Dept: CARDIAC REHAB | Facility: HOSPITAL | Age: 76
End: 2021-09-10

## 2021-09-13 ENCOUNTER — APPOINTMENT (OUTPATIENT)
Dept: CARDIAC REHAB | Facility: HOSPITAL | Age: 76
End: 2021-09-13

## 2021-09-15 ENCOUNTER — APPOINTMENT (OUTPATIENT)
Dept: CARDIAC REHAB | Facility: HOSPITAL | Age: 76
End: 2021-09-15

## 2021-09-17 ENCOUNTER — APPOINTMENT (OUTPATIENT)
Dept: CARDIAC REHAB | Facility: HOSPITAL | Age: 76
End: 2021-09-17

## 2021-09-20 ENCOUNTER — TREATMENT (OUTPATIENT)
Dept: CARDIAC REHAB | Facility: HOSPITAL | Age: 76
End: 2021-09-20

## 2021-09-20 DIAGNOSIS — Z95.5 S/P DRUG ELUTING CORONARY STENT PLACEMENT: Primary | ICD-10-CM

## 2021-09-20 PROCEDURE — 93798 PHYS/QHP OP CAR RHAB W/ECG: CPT

## 2021-09-22 ENCOUNTER — TREATMENT (OUTPATIENT)
Dept: CARDIAC REHAB | Facility: HOSPITAL | Age: 76
End: 2021-09-22

## 2021-09-22 DIAGNOSIS — Z95.5 S/P DRUG ELUTING CORONARY STENT PLACEMENT: Primary | ICD-10-CM

## 2021-09-22 PROCEDURE — 93798 PHYS/QHP OP CAR RHAB W/ECG: CPT

## 2021-09-27 ENCOUNTER — TREATMENT (OUTPATIENT)
Dept: CARDIAC REHAB | Facility: HOSPITAL | Age: 76
End: 2021-09-27

## 2021-09-27 DIAGNOSIS — Z95.5 S/P DRUG ELUTING CORONARY STENT PLACEMENT: Primary | ICD-10-CM

## 2021-09-27 PROCEDURE — 93798 PHYS/QHP OP CAR RHAB W/ECG: CPT

## 2021-09-27 NOTE — PROGRESS NOTES
Chief Complaint  Cardiac Rehab Phase II    Subjective          Kwabena Le Jr. presents to [facility]  History of Present Illness    Objective   Vital Signs:   There were no vitals taken for this visit.    Physical Exam   Result Review :                 Assessment and Plan    Diagnoses and all orders for this visit:    1. S/P drug eluting coronary stent placement (Primary)        Follow Up   No follow-ups on file.  Patient was given instructions and counseling regarding his condition or for health maintenance advice. Please see specific information pulled into the AVS if appropriate.

## 2021-09-29 ENCOUNTER — APPOINTMENT (OUTPATIENT)
Dept: CARDIAC REHAB | Facility: HOSPITAL | Age: 76
End: 2021-09-29

## 2021-10-01 ENCOUNTER — APPOINTMENT (OUTPATIENT)
Dept: CARDIAC REHAB | Facility: HOSPITAL | Age: 76
End: 2021-10-01

## 2021-11-09 ENCOUNTER — OFFICE VISIT (OUTPATIENT)
Dept: CARDIOLOGY | Facility: CLINIC | Age: 76
End: 2021-11-09

## 2021-11-09 VITALS
OXYGEN SATURATION: 93 % | RESPIRATION RATE: 16 BRPM | HEIGHT: 71 IN | HEART RATE: 61 BPM | SYSTOLIC BLOOD PRESSURE: 132 MMHG | WEIGHT: 209 LBS | DIASTOLIC BLOOD PRESSURE: 80 MMHG | BODY MASS INDEX: 29.26 KG/M2

## 2021-11-09 DIAGNOSIS — I47.1 PSVT (PAROXYSMAL SUPRAVENTRICULAR TACHYCARDIA) (HCC): ICD-10-CM

## 2021-11-09 DIAGNOSIS — N18.31 STAGE 3A CHRONIC KIDNEY DISEASE (HCC): ICD-10-CM

## 2021-11-09 DIAGNOSIS — I10 PRIMARY HYPERTENSION: ICD-10-CM

## 2021-11-09 DIAGNOSIS — E11.59 TYPE 2 DIABETES MELLITUS WITH OTHER CIRCULATORY COMPLICATION, WITHOUT LONG-TERM CURRENT USE OF INSULIN (HCC): ICD-10-CM

## 2021-11-09 DIAGNOSIS — I25.5 ISCHEMIC CARDIOMYOPATHY: ICD-10-CM

## 2021-11-09 DIAGNOSIS — I25.10 CORONARY ARTERY DISEASE INVOLVING NATIVE CORONARY ARTERY OF NATIVE HEART WITHOUT ANGINA PECTORIS: Primary | ICD-10-CM

## 2021-11-09 PROBLEM — I20.0 UNSTABLE ANGINA (HCC): Status: RESOLVED | Noted: 2021-07-08 | Resolved: 2021-11-09

## 2021-11-09 PROBLEM — R07.9 CHEST PAIN: Status: RESOLVED | Noted: 2021-07-06 | Resolved: 2021-11-09

## 2021-11-09 PROCEDURE — 99214 OFFICE O/P EST MOD 30 MIN: CPT | Performed by: INTERNAL MEDICINE

## 2021-11-09 PROCEDURE — 93000 ELECTROCARDIOGRAM COMPLETE: CPT | Performed by: INTERNAL MEDICINE

## 2021-11-09 RX ORDER — OMEPRAZOLE 40 MG/1
CAPSULE, DELAYED RELEASE ORAL
COMMUNITY
Start: 2021-10-19

## 2021-11-09 NOTE — PROGRESS NOTES
Date of Office Visit: 2021    Encounter Provider: Enrike Haney MD  Place of Service: [facility] CARDIOLOGY  Patient Name: Kwabena Le Jr.  :1945    Chief Complaint   Patient presents with   • Coronary Artery Disease   :     HPI:     Mr. Le is 76 y.o. and presents today to follow up.  He established care with me in May 2021. I have reviewed prior notes and there are no changes except for any new updates described below. I have also reviewed any information entered into the medical record by the patient or by ancillary staff.     He suffered a non-ST elevation myocardial infarction in . He had classic anginal symptoms (heavy chest pressure, bilateral arm discomfort, dyspnea, diaphoresis). I do not have the actual cardiac cath report; I have a synopsis of it. Thankfully, he still has his stent cards.  It states that the culprit vessel was the second obtuse marginal, which received two drug-eluting stents (Cypher 2.5x28 and 2.6x13mm).  He also underwent drug-eluting stent placement to the first obtuse marginal ( MX2 3.5x24mm) and the first diagonal ( MX2 3.5x18mm) at that time.  It stated that he had apical LAD disease which was treated medically.  He had a normal perfusion stress test in 2020.    He has a history of SVT.  He presented to the ED in 2020 with chest tightness and a rapid heart rate.  I do not have any tracings of the tachycardia, but his heart rate was reported to be 190 bpm.  He was given adenosine by EMS and converted to sinus.  He presented again in 2021 with the same.  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 Of note, the second episode occurred right after he had a relatively mild case of COVID-19.    He presented in 2021 with unstable angina; he was taken for coronary angiography which revealed patent stents in OM1 and OM2, and a 99% lesion in the LAD, which was treated  with a drug eluting stent. There were no other significant lesions. His LVEF was 35-40% with anteroapical hypokinesis.     He felt immediately better post-PCI.  He denies any cardiac complaints. He completed cardiac rehab early; he has a lot of responsibility at home with his wife who has MS. He denies bleeding. He checks his BP at home; his SBP is always 125-135mm Hg.     Past Medical History:   Diagnosis Date   • Acute gastrointestinal hemorrhage 8/10/2018   • Perez's esophagus with high grade dysplasia 9/18/2018    Added automatically from request for surgery [id number]   • BPH (benign prostatic hyperplasia)    • CAD (coronary artery disease)     previous PCI to OM1 and OM2; cath 7/2021 with 99% mid LAD s/p 3x15mm Xience KASSY, patient stents and no other significant CAD   • CKD (chronic kidney disease) stage 3, GFR 30-59 ml/min ([facility]) 5/4/2021   • Colon polyp    • GERD (gastroesophageal reflux disease)    • History of transfusion    • Hyperlipidemia    • Hypertension    • Osteoarthritis    • PSVT (paroxysmal supraventricular tachycardia) ([facility])    • Type 2 diabetes mellitus ([facility])        Past Surgical History:   Procedure Laterality Date   • APPENDECTOMY     • BIOPSY PENILE      couple years ago was benign of prostate   • CARDIAC CATHETERIZATION      hAD FOUR STENTS   • CARDIAC CATHETERIZATION N/A 7/7/2021    Procedure: Left Heart Cath;  Surgeon: Trinidad Cummings MD;  Location: [facility] CATH INVASIVE LOCATION;  Service: Cardiology;  Laterality: N/A;   • CARDIAC CATHETERIZATION N/A 7/7/2021    Procedure: Coronary angiography;  Surgeon: Trinidad Cummings MD;  Location: [facility] CATH INVASIVE LOCATION;  Service: Cardiology;  Laterality: N/A;   • CARDIAC CATHETERIZATION N/A 7/7/2021    Procedure: Left ventriculography;  Surgeon: Triindad Cummings MD;  Location: [facility] CATH INVASIVE LOCATION;  Service: Cardiology;  Laterality: N/A;   • CARDIAC CATHETERIZATION N/A 7/7/2021    Procedure: Stent KASSY coronary;  Surgeon: Trinidad Cummings MD;   Location: [facility] CATH INVASIVE LOCATION;  Service: Cardiology;  Laterality: N/A;   • CARDIAC SURGERY     • COLONOSCOPY      10 PLUS YEARS AGO   • COLONOSCOPY N/A 8/13/2018    Procedure: COLONOSCOPY and polypectomy;  Surgeon: Mili Iraheta MD;  Location: [facility] OR;  Service: Gastroenterology   • CYSTOSCOPY      2 years ago   • ENDOSCOPY N/A 8/11/2018    Procedure: ESOPHAGOGASTRODUODENOSCOPY w/ biopsies;  Surgeon: Mili Iraheta MD;  Location: [facility] OR;  Service: Gastroenterology   • ESOPHAGOSCOPY N/A 9/21/2018    Procedure: ESOPHAGOSCOPY WITH FOCAL RADIO FREQUENCY ABLATION;  Surgeon: Jaen Rasmussen MD;  Location: [facility] ENDOSCOPY;  Service: Gastroenterology   • TURP / TRANSURETHRAL INCISION / DRAINAGE PROSTATE      2 years ago       Social History     Socioeconomic History   • Marital status:    • Number of children: 2   Tobacco Use   • Smoking status: Never Smoker   • Smokeless tobacco: Never Used   • Tobacco comment: daily caffiene   Vaping Use   • Vaping Use: Never used   Substance and Sexual Activity   • Alcohol use: No   • Drug use: No   • Sexual activity: Defer     Partners: Male       Family History   Problem Relation Age of Onset   • Hypertension Mother    • Arthritis Mother    • Heart disease Father    • Heart disease Sister    • Hypertension Maternal Aunt    • Hypertension Maternal Uncle    • Heart disease Maternal Grandfather    • Colon cancer Neg Hx    • Colon polyps Neg Hx        Review of Systems   All other systems reviewed and are negative.      No Known Allergies      Current Outpatient Medications:   •  allopurinol (ZYLOPRIM) 300 MG tablet, allopurinol 300 mg tablet  TAKE 1 TABLET DAILY, Disp: , Rfl:   •  ASPIRIN 81 PO, aspirin 81 mg tablet,delayed release  Take 1 tablet every day by oral route., Disp: , Rfl:   •  atorvastatin (LIPITOR) 40 MG tablet, atorvastatin 40 mg tablet  TAKE 1 TABLET DAILY IN THE EVENING, Disp: , Rfl:   •  carvedilol (COREG) 25 MG tablet, Take 25 mg by mouth  "2 (Two) Times a Day With Meals., Disp: , Rfl:   •  clopidogrel (PLAVIX) 75 MG tablet, Take 1 tablet by mouth Daily., Disp: 90 tablet, Rfl: 3  •  dapagliflozin (Farxiga) 5 MG tablet tablet, Farxiga 5 mg tablet  Take 1 tablet every day by oral route for 30 days., Disp: , Rfl:   •  finasteride (PROSCAR) 5 MG tablet, finasteride 5 mg tablet, Disp: , Rfl:   •  lisinopril (PRINIVIL,ZESTRIL) 20 MG tablet, Take 1 tablet by mouth Daily., Disp: 90 tablet, Rfl: 3  •  nitroglycerin (NITROSTAT) 0.4 MG SL tablet, Place 1 tablet under the tongue Every 5 (Five) Minutes As Needed for Chest Pain. Take no more than 3 doses in 15 minutes., Disp: 25 tablet, Rfl: 1  •  omeprazole (priLOSEC) 40 MG capsule, , Disp: , Rfl:   •  SITagliptin-metFORMIN HCl ER (Janumet XR)  MG tablet, 1 tablet 2 (two) times a day., Disp: , Rfl:       Objective:     Vitals:    11/09/21 0947 11/09/21 1018   BP: 150/80 132/80   Pulse: 61    Resp: 16    SpO2: 93%    Weight: 94.8 kg (209 lb)    Height: 180.3 cm (71\")      Body mass index is 29.15 kg/m².    Vitals reviewed.   Constitutional:       Appearance: Healthy appearance. Well-developed and not in distress.   Eyes:      Conjunctiva/sclera: Conjunctivae normal.   HENT:      Head: Normocephalic.      Nose: Nose normal.         Comments: Masked  Neck:      Vascular: No JVD. JVD normal.      Lymphadenopathy: No cervical adenopathy.   Pulmonary:      Effort: Pulmonary effort is normal.      Breath sounds: Normal breath sounds.   Cardiovascular:      Normal rate. Regular rhythm.      Murmurs: There is no murmur.   Pulses:     Intact distal pulses.   Edema:     Peripheral edema absent.   Abdominal:      Palpations: Abdomen is soft.      Tenderness: There is no abdominal tenderness.   Musculoskeletal: Normal range of motion.      Cervical back: Normal range of motion. Skin:     General: Skin is warm and dry.      Findings: No rash.   Neurological:      General: No focal deficit present.      Mental Status: " Alert, oriented to person, place, and time and oriented to person, place and time.      Cranial Nerves: No cranial nerve deficit.   Psychiatric:         Behavior: Behavior normal.         Thought Content: Thought content normal.         Judgment: Judgment normal.           ECG 12 Lead    Date/Time: 11/9/2021 10:12 AM  Performed by: Enrike Haney MD  Authorized by: Enrike Haney MD   Comparison: compared with previous ECG   Similar to previous ECG  Rhythm: sinus rhythm  Conduction: conduction normal  ST Segments: ST segments normal  T Waves: T waves normal  QRS axis: normal  Other: no other findings    Clinical impression: normal ECG            Assessment:       Diagnosis Plan   1. Coronary artery disease involving native coronary artery of native heart without angina pectoris  ECG 12 Lead   2. Ischemic cardiomyopathy     3. PSVT (paroxysmal supraventricular tachycardia) ([facility])     4. Primary hypertension     5. Stage 3a chronic kidney disease ([facility])     6. Type 2 diabetes mellitus with other circulatory complication, without long-term current use of insulin ([facility])          Plan:       1/2. Coronary Artery Disease  Assessment  • The patient has no angina    Subjective - Objective  • There has been a previous stent procedure using KASSY 2007, 7/2021  • Current antiplatelet therapy includes aspirin 81 mg and clopidogrel 75 mg  • The patient qualifies for cardiac rehabilitation, and has completed a cardiac rehab program      He's on atorvastatin.     I will repeat an echo; I'm optimistic that his LVEF/wall motion have normalized. He's euvolemic and on carvedilol/lisinopril/dapagliflozin.    3.  He has a history of PSVT, likely AVNRT.  He's had two episodes in the last 2 years. If he has recurrences while on high dose carvedilol, we may have to consider EPS/ablation.    4/5/6.  His BP was high upon arrival, but was much better upon recheck. His SBP at home is well controlled.     Sincerely,       Enrike Haney,  MD

## 2021-11-12 ENCOUNTER — HOSPITAL ENCOUNTER (OUTPATIENT)
Dept: CARDIOLOGY | Facility: HOSPITAL | Age: 76
Discharge: HOME | End: 2021-11-12
Admitting: INTERNAL MEDICINE

## 2021-11-12 VITALS
BODY MASS INDEX: 29.32 KG/M2 | HEART RATE: 61 BPM | DIASTOLIC BLOOD PRESSURE: 62 MMHG | WEIGHT: 209.44 LBS | OXYGEN SATURATION: 97 % | HEIGHT: 71 IN | SYSTOLIC BLOOD PRESSURE: 132 MMHG

## 2021-11-12 DIAGNOSIS — I25.5 ISCHEMIC CARDIOMYOPATHY: ICD-10-CM

## 2021-11-12 LAB
AORTIC ARCH: 2.4 CM
AORTIC DIMENSIONLESS INDEX: 0.9 (DI)
ASCENDING AORTA: 3.5 CM
BH CV ECHO MEAS - ACS: 2.4 CM
BH CV ECHO MEAS - AI DEC SLOPE: 79.8 CM/SEC^2
BH CV ECHO MEAS - AI MAX PG: 24.6 MMHG
BH CV ECHO MEAS - AI MAX VEL: 248 CM/SEC
BH CV ECHO MEAS - AI P1/2T: 910.2 MSEC
BH CV ECHO MEAS - AO ACC TIME: 0.13 SEC
BH CV ECHO MEAS - AO MAX PG: 6 MMHG
BH CV ECHO MEAS - AO MEAN PG (FULL): -0.33 MMHG
BH CV ECHO MEAS - AO MEAN PG: 2.7 MMHG
BH CV ECHO MEAS - AO ROOT AREA (BSA CORRECTED): 1.5
BH CV ECHO MEAS - AO ROOT AREA: 8 CM^2
BH CV ECHO MEAS - AO ROOT DIAM: 3.2 CM
BH CV ECHO MEAS - AO V2 MAX: 119 CM/SEC
BH CV ECHO MEAS - AO V2 MEAN: 72.2 CM/SEC
BH CV ECHO MEAS - AO V2 VTI: 24.3 CM
BH CV ECHO MEAS - ASC AORTA: 3.5 CM
BH CV ECHO MEAS - AVA(I,A): 3.7 CM^2
BH CV ECHO MEAS - AVA(I,D): 3.7 CM^2
BH CV ECHO MEAS - BSA(HAYCOCK): 2.2 M^2
BH CV ECHO MEAS - BSA: 2.1 M^2
BH CV ECHO MEAS - BZI_BMI: 29.3 KILOGRAMS/M^2
BH CV ECHO MEAS - BZI_METRIC_HEIGHT: 180 CM
BH CV ECHO MEAS - BZI_METRIC_WEIGHT: 95 KG
BH CV ECHO MEAS - EDV(CUBED): 94.2 ML
BH CV ECHO MEAS - EDV(MOD-SP2): 141 ML
BH CV ECHO MEAS - EDV(MOD-SP4): 131 ML
BH CV ECHO MEAS - EDV(TEICH): 94.9 ML
BH CV ECHO MEAS - EF(CUBED): 77.9 %
BH CV ECHO MEAS - EF(MOD-BP): 55.5 %
BH CV ECHO MEAS - EF(MOD-SP2): 56.5 %
BH CV ECHO MEAS - EF(MOD-SP4): 56.1 %
BH CV ECHO MEAS - EF(TEICH): 70.2 %
BH CV ECHO MEAS - ESV(CUBED): 20.8 ML
BH CV ECHO MEAS - ESV(MOD-SP2): 61.3 ML
BH CV ECHO MEAS - ESV(MOD-SP4): 57.5 ML
BH CV ECHO MEAS - ESV(TEICH): 28.3 ML
BH CV ECHO MEAS - FS: 39.6 %
BH CV ECHO MEAS - IVS/LVPW: 1.3
BH CV ECHO MEAS - IVSD: 1.3 CM
BH CV ECHO MEAS - LAT PEAK E' VEL: 8.5 CM/SEC
BH CV ECHO MEAS - LV DIASTOLIC VOL/BSA (35-75): 61 ML/M^2
BH CV ECHO MEAS - LV MASS(C)D: 181.5 GRAMS
BH CV ECHO MEAS - LV MASS(C)DI: 84.5 GRAMS/M^2
BH CV ECHO MEAS - LV MAX PG: 5.3 MMHG
BH CV ECHO MEAS - LV MEAN PG: 3 MMHG
BH CV ECHO MEAS - LV SYSTOLIC VOL/BSA (12-30): 26.8 ML/M^2
BH CV ECHO MEAS - LV V1 MAX: 115 CM/SEC
BH CV ECHO MEAS - LV V1 MEAN: 74.9 CM/SEC
BH CV ECHO MEAS - LV V1 VTI: 23.7 CM
BH CV ECHO MEAS - LVIDD: 4.6 CM
BH CV ECHO MEAS - LVIDS: 2.8 CM
BH CV ECHO MEAS - LVLD AP2: 9.3 CM
BH CV ECHO MEAS - LVLD AP4: 9 CM
BH CV ECHO MEAS - LVLS AP2: 7.7 CM
BH CV ECHO MEAS - LVLS AP4: 7.1 CM
BH CV ECHO MEAS - LVOT AREA (M): 3.8 CM^2
BH CV ECHO MEAS - LVOT AREA: 3.8 CM^2
BH CV ECHO MEAS - LVOT DIAM: 2.2 CM
BH CV ECHO MEAS - LVPWD: 0.96 CM
BH CV ECHO MEAS - MED PEAK E' VEL: 5.4 CM/SEC
BH CV ECHO MEAS - MV A MAX VEL: 78.3 CM/SEC
BH CV ECHO MEAS - MV DEC SLOPE: 305 CM/SEC^2
BH CV ECHO MEAS - MV DEC TIME: 211 SEC
BH CV ECHO MEAS - MV E MAX VEL: 48 CM/SEC
BH CV ECHO MEAS - MV E/A: 0.61
BH CV ECHO MEAS - MV MEAN PG: 1 MMHG
BH CV ECHO MEAS - MV P1/2T MAX VEL: 62.1 CM/SEC
BH CV ECHO MEAS - MV P1/2T: 59.6 MSEC
BH CV ECHO MEAS - MV V2 MEAN: 34.5 CM/SEC
BH CV ECHO MEAS - MV V2 VTI: 21.5 CM
BH CV ECHO MEAS - MVA P1/2T LCG: 3.5 CM^2
BH CV ECHO MEAS - MVA(P1/2T): 3.7 CM^2
BH CV ECHO MEAS - MVA(VTI): 4.2 CM^2
BH CV ECHO MEAS - PA ACC TIME: 0.11 SEC
BH CV ECHO MEAS - PA MAX PG: 2.4 MMHG
BH CV ECHO MEAS - PA PR(ACCEL): 31.3 MMHG
BH CV ECHO MEAS - PA V2 MAX: 76.6 CM/SEC
BH CV ECHO MEAS - PI END-D VEL: 87.5 CM/SEC
BH CV ECHO MEAS - PULM A REVS DUR: 0.08 SEC
BH CV ECHO MEAS - PULM A REVS VEL: 28.8 CM/SEC
BH CV ECHO MEAS - PULM DIAS VEL: 37.2 CM/SEC
BH CV ECHO MEAS - PULM S/D: 1.5
BH CV ECHO MEAS - PULM SYS VEL: 55.6 CM/SEC
BH CV ECHO MEAS - QP/QS: 0.27
BH CV ECHO MEAS - RAP SYSTOLE: 3 MMHG
BH CV ECHO MEAS - RV MEAN PG: 0 MMHG
BH CV ECHO MEAS - RV V1 MEAN: 30.5 CM/SEC
BH CV ECHO MEAS - RV V1 VTI: 10.7 CM
BH CV ECHO MEAS - RVOT AREA: 2.3 CM^2
BH CV ECHO MEAS - RVOT DIAM: 1.7 CM
BH CV ECHO MEAS - RVSP: 21.5 MMHG
BH CV ECHO MEAS - SI(AO): 91.1 ML/M^2
BH CV ECHO MEAS - SI(CUBED): 34.2 ML/M^2
BH CV ECHO MEAS - SI(LVOT): 41.9 ML/M^2
BH CV ECHO MEAS - SI(MOD-SP2): 37.1 ML/M^2
BH CV ECHO MEAS - SI(MOD-SP4): 34.2 ML/M^2
BH CV ECHO MEAS - SI(TEICH): 31 ML/M^2
BH CV ECHO MEAS - SUP REN AO DIAM: 2.9 CM
BH CV ECHO MEAS - SV(AO): 195.7 ML
BH CV ECHO MEAS - SV(CUBED): 73.4 ML
BH CV ECHO MEAS - SV(LVOT): 90.1 ML
BH CV ECHO MEAS - SV(MOD-SP2): 79.7 ML
BH CV ECHO MEAS - SV(MOD-SP4): 73.5 ML
BH CV ECHO MEAS - SV(RVOT): 24.3 ML
BH CV ECHO MEAS - SV(TEICH): 66.6 ML
BH CV ECHO MEAS - TAPSE (>1.6): 2.2 CM
BH CV ECHO MEAS - TR MAX VEL: 214.3 CM/SEC
BH CV ECHO MEASUREMENTS AVERAGE E/E' RATIO: 6.91
BH CV VAS BP LEFT ARM: NORMAL MMHG
BH CV XLRA - RV BASE: 3.9 CM
BH CV XLRA - RV LENGTH: 6.7 CM
BH CV XLRA - RV MID: 2.5 CM
BH CV XLRA - TDI S': 14.8 CM/SEC
LEFT ATRIUM VOLUME INDEX: 25.9 ML/M2
SINUS: 3.6 CM
STJ: 3 CM

## 2021-11-12 PROCEDURE — 93306 TTE W/DOPPLER COMPLETE: CPT | Performed by: INTERNAL MEDICINE

## 2021-11-12 PROCEDURE — 93306 TTE W/DOPPLER COMPLETE: CPT

## 2021-11-12 NOTE — PROGRESS NOTES
Please give him the good news that this looks great! The area of heart muscle that was temporarily weak right before his stent has normalized. I'm very happy with this echo result.    Peter becker

## 2022-05-10 ENCOUNTER — OFFICE VISIT (OUTPATIENT)
Dept: CARDIOLOGY | Facility: CLINIC | Age: 77
End: 2022-05-10

## 2022-05-10 VITALS
BODY MASS INDEX: 30.64 KG/M2 | HEIGHT: 70 IN | WEIGHT: 214 LBS | SYSTOLIC BLOOD PRESSURE: 150 MMHG | RESPIRATION RATE: 16 BRPM | DIASTOLIC BLOOD PRESSURE: 78 MMHG | OXYGEN SATURATION: 96 % | HEART RATE: 56 BPM

## 2022-05-10 DIAGNOSIS — I25.10 CORONARY ARTERY DISEASE INVOLVING NATIVE CORONARY ARTERY OF NATIVE HEART WITHOUT ANGINA PECTORIS: Primary | ICD-10-CM

## 2022-05-10 DIAGNOSIS — E78.2 MIXED HYPERLIPIDEMIA: ICD-10-CM

## 2022-05-10 DIAGNOSIS — I47.1 PSVT (PAROXYSMAL SUPRAVENTRICULAR TACHYCARDIA): ICD-10-CM

## 2022-05-10 DIAGNOSIS — I25.5 ISCHEMIC CARDIOMYOPATHY: ICD-10-CM

## 2022-05-10 DIAGNOSIS — I10 PRIMARY HYPERTENSION: ICD-10-CM

## 2022-05-10 PROCEDURE — 99214 OFFICE O/P EST MOD 30 MIN: CPT | Performed by: NURSE PRACTITIONER

## 2022-05-10 PROCEDURE — 93000 ELECTROCARDIOGRAM COMPLETE: CPT | Performed by: NURSE PRACTITIONER

## 2022-05-10 RX ORDER — GLIPIZIDE 5 MG/1
TABLET, FILM COATED, EXTENDED RELEASE ORAL
COMMUNITY

## 2022-05-10 NOTE — PROGRESS NOTES
Date of Office Visit: 05/10/2022  Encounter Provider: DMITRY Tanner  Place of Service: [facility] CARDIOLOGY  Patient Name: Kwabena Le Jr.  :1945  Primary Cardiologist: Dr. Haney    CC:  6 month follow up    Dear Dr. Arreguin    HPI: Kwabena Le Jr. is a pleasant 77 y.o. male who presents 05/10/2022 for cardiac follow up. I have reviewed his past medical records including notes, labs and testing I preparation for today's visit.    He suffered a non-ST elevation myocardial infarction in . He had classic anginal symptoms (heavy chest pressure, bilateral arm discomfort, dyspnea, diaphoresis). I do not have the actual cardiac cath report; I have a synopsis of it. Thankfully, he still has his stent cards.  It states that the culprit vessel was the second obtuse marginal, which received two drug-eluting stents (Cypher 2.5x28 and 2.6x13mm).  He also underwent drug-eluting stent placement to the first obtuse marginal ( MX2 3.5x24mm) and the first diagonal ( MX2 3.5x18mm) at that time.  It stated that he had apical LAD disease which was treated medically.  He had a normal perfusion stress test in 2020.     He has a history of SVT.  He presented to the ED in 2020 with chest tightness and a rapid heart rate.   We do not have any tracings of the tachycardia, but his heart rate was reported to be 190 bpm.  He was given adenosine by EMS and converted to sinus.  He presented again in 2021 with the same.  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 Of note, the second episode occurred right after he had a relatively mild case of COVID-19.     He presented in 2021 with unstable angina; he was taken for coronary angiography which revealed patent stents in OM1 and OM2, and a 99% lesion in the LAD, which was treated with a drug eluting stent. There were no other significant lesions. His LVEF was 35-40% with  anteroapical hypokinesis.  He felt immediately better post-PCI.     11/2021 Echo Interpretation Summary  · Calculated left ventricular EF = 55.5% Estimated left ventricular EF was in agreement with the calculated left ventricular EF. Left ventricular systolic function is normal. Normal left ventricular cavity size and wall thickness noted. All left ventricular wall segments contract normally. Left ventricular diastolic function is consistent with (grade I) impaired relaxation.     He presents today for 6-month follow-up.  He states he has been doing well.  He denies any palpitations, shortness of breath, lower extremity edema.  He has not had any dizziness, lightheadedness, syncope or presyncopal episodes.  He denies any chest pain chest pressure or fatigue.  He only took the Plavix for what sounds like 90 days.  He has remained on the aspirin.  He does not know why he thought he was supposed to stop the Plavix but he did not.  His blood pressure at home is usually 130s 140s-70s.  He states at times it is even 120s over 70s.  He states when he last saw you it was really good 127/70.    Past Medical History:   Diagnosis Date   • Acute gastrointestinal hemorrhage 8/10/2018   • Perez's esophagus with high grade dysplasia 9/18/2018    Added automatically from request for surgery [id number]   • BPH (benign prostatic hyperplasia)    • CAD (coronary artery disease)     previous PCI to OM1 and OM2; cath 7/2021 with 99% mid LAD s/p 3x15mm Xience KASSY, patient stents and no other significant CAD   • CKD (chronic kidney disease) stage 3, GFR 30-59 ml/min ([facility]) 5/4/2021   • Colon polyp    • GERD (gastroesophageal reflux disease)    • History of transfusion    • Hyperlipidemia    • Hypertension    • Osteoarthritis    • PSVT (paroxysmal supraventricular tachycardia) ([facility])    • Type 2 diabetes mellitus ([facility])        Past Surgical History:   Procedure Laterality Date   • APPENDECTOMY     • BIOPSY PENILE      couple years ago was  benign of prostate   • CARDIAC CATHETERIZATION      hAD FOUR STENTS   • CARDIAC CATHETERIZATION N/A 7/7/2021    Procedure: Left Heart Cath;  Surgeon: Trinidad Cummings MD;  Location: [facility] CATH INVASIVE LOCATION;  Service: Cardiology;  Laterality: N/A;   • CARDIAC CATHETERIZATION N/A 7/7/2021    Procedure: Coronary angiography;  Surgeon: Trinidad Cummings MD;  Location[facility] CATH INVASIVE LOC[facility];  Service: Cardiology;  Laterality: N/A;   • CARDIAC CATHETERIZATION N/A 7/7/2021    Procedure: Left ventriculography;  Surgeon: Trinidad Cummings MD;  Location: [facility] CATH INVASIVE LOCATION;  Service: Cardiology;  Laterality: N/A;   • CARDIAC CATHETERIZATION N/A 7/7/2021    Procedure: Stent KASSY coronary;  Surgeon: Trinidad Cummings MD;  Location: [facility] CATH INVASIVE LOCATION;  Service: Cardiology;  Laterality: N/A;   • CARDIAC SURGERY     • COLONOSCOPY      10 PLUS YEARS AGO   • COLONOSCOPY N/A 8/13/2018    Procedure: COLONOSCOPY and polypectomy;  Surgeon: Mili Iraheta MD;  Location: [facility] OR;  Service: Gastroenterology   • CYSTOSCOPY      2 years ago   • ENDOSCOPY N/A 8/11/2018    Procedure: ESOPHAGOGASTRODUODENOSCOPY w/ biopsies;  Surgeon: Mili Iraheta MD;  Location: [facility] OR;  Service: Gastroenterology   • ESOPHAGOSCOPY N/A 9/21/2018    Procedure: ESOPHAGOSCOPY WITH FOCAL RADIO FREQUENCY ABLATION;  Surgeon: Jean Rasmussen MD;  Location: [facility] ENDOSCOPY;  Service: Gastroenterology   • TURP / TRANSURETHRAL INCISION / DRAINAGE PROSTATE      2 years ago       Social History     Socioeconomic History   • Marital status:    • Number of children: 2   Tobacco Use   • Smoking status: Never Smoker   • Smokeless tobacco: Never Used   • Tobacco comment: daily caffiene   Vaping Use   • Vaping Use: Never used   Substance and Sexual Activity   • Alcohol use: No   • Drug use: No   • Sexual activity: Defer     Partners: Male       Family History   Problem Relation Age of Onset   • Hypertension Mother    • Arthritis  Mother    • Heart disease Father    • Heart disease Sister    • Hypertension Maternal Aunt    • Hypertension Maternal Uncle    • Heart disease Maternal Grandfather    • Colon cancer Neg Hx    • Colon polyps Neg Hx        The following portion of the patient's history were reviewed and updated as appropriate: past medical history, past surgical history, past social history, past family history, allergies, current medications, and problem list.    Review of Systems   Constitutional: Negative for diaphoresis, fever and malaise/fatigue.   HENT: Negative for congestion, hearing loss, hoarse voice, nosebleeds and sore throat.    Eyes: Negative for photophobia, vision loss in left eye, vision loss in right eye and visual disturbance.   Cardiovascular: Negative for chest pain, dyspnea on exertion, irregular heartbeat, leg swelling, near-syncope, orthopnea, palpitations, paroxysmal nocturnal dyspnea and syncope.   Respiratory: Negative for cough, hemoptysis, shortness of breath, sleep disturbances due to breathing, snoring, sputum production and wheezing.    Endocrine: Negative for cold intolerance, heat intolerance, polydipsia, polyphagia and polyuria.   Hematologic/Lymphatic: Negative for bleeding problem. Does not bruise/bleed easily.   Skin: Negative for color change, dry skin, poor wound healing, rash and suspicious lesions.   Musculoskeletal: Negative for arthritis, back pain, falls, gout, joint pain, joint swelling, muscle cramps, muscle weakness and myalgias.   Gastrointestinal: Negative for bloating, abdominal pain, constipation, diarrhea, dysphagia, melena, nausea and vomiting.   Neurological: Negative for excessive daytime sleepiness, dizziness, headaches, light-headedness, loss of balance, numbness, paresthesias, seizures, vertigo and weakness.   Psychiatric/Behavioral: Negative for depression, memory loss and substance abuse. The patient is not nervous/anxious.        No Known Allergies      Current Outpatient  "Medications:   •  allopurinol (ZYLOPRIM) 300 MG tablet, allopurinol 300 mg tablet  TAKE 1 TABLET DAILY, Disp: , Rfl:   •  ASPIRIN 81 PO, aspirin 81 mg tablet,delayed release  Take 1 tablet every day by oral route., Disp: , Rfl:   •  atorvastatin (LIPITOR) 40 MG tablet, atorvastatin 40 mg tablet  TAKE 1 TABLET DAILY IN THE EVENING, Disp: , Rfl:   •  carvedilol (COREG) 25 MG tablet, Take 25 mg by mouth 2 (Two) Times a Day With Meals., Disp: , Rfl:   •  dapagliflozin (Farxiga) 5 MG tablet tablet, Farxiga 5 mg tablet  Take 1 tablet every day by oral route for 30 days., Disp: , Rfl:   •  finasteride (PROSCAR) 5 MG tablet, finasteride 5 mg tablet, Disp: , Rfl:   •  glipizide (GLUCOTROL XL) 5 MG ER tablet, glipizide ER 5 mg tablet, extended release 24 hr  Take 1 tablet every day by oral route for 90 days., Disp: , Rfl:   •  lisinopril (PRINIVIL,ZESTRIL) 20 MG tablet, Take 1 tablet by mouth Daily., Disp: 90 tablet, Rfl: 3  •  nitroglycerin (NITROSTAT) 0.4 MG SL tablet, Place 1 tablet under the tongue Every 5 (Five) Minutes As Needed for Chest Pain. Take no more than 3 doses in 15 minutes., Disp: 25 tablet, Rfl: 1  •  omeprazole (priLOSEC) 40 MG capsule, , Disp: , Rfl:   •  SITagliptin-metFORMIN HCl ER (Janumet XR)  MG tablet, 1 tablet 2 (two) times a day., Disp: , Rfl:         Objective:     Vitals:    05/10/22 0906   BP: 150/78   Pulse: 56   Resp: 16   SpO2: 96%   Weight: 97.1 kg (214 lb)   Height: 177.8 cm (70\")     Body mass index is 30.71 kg/m².      Vitals reviewed.   Constitutional:       General: Not in acute distress.     Appearance: Normal and healthy appearance. Well-developed.   Eyes:      General:         Right eye: No discharge.         Left eye: No discharge.      Conjunctiva/sclera: Conjunctivae normal.   HENT:      Head: Normocephalic and atraumatic.      Right Ear: External ear normal.      Left Ear: External ear normal.      Nose: Nose normal.   Neck:      Thyroid: No thyromegaly.      Vascular: No " JVD.      Trachea: No tracheal deviation.      Lymphadenopathy: No cervical adenopathy.   Pulmonary:      Effort: Pulmonary effort is normal. No respiratory distress.      Breath sounds: Normal breath sounds. No wheezing. No rales.   Chest:      Chest wall: Not tender to palpatation.   Cardiovascular:      Bradycardia present. Regular rhythm.      No gallop.   Pulses:     Intact distal pulses.   Edema:     Peripheral edema absent.   Abdominal:      General: There is no distension.      Palpations: Abdomen is soft.      Tenderness: There is no abdominal tenderness.   Musculoskeletal: Normal range of motion.         General: No tenderness or deformity.      Cervical back: Normal range of motion and neck supple. Skin:     General: Skin is warm and dry.      Findings: No erythema or rash.   Neurological:      Mental Status: Alert and oriented to person, place, and time.      Coordination: Coordination normal.   Psychiatric:         Attention and Perception: Attention normal.         Mood and Affect: Mood normal.         Speech: Speech normal.         Behavior: Behavior normal. Behavior is cooperative.         Thought Content: Thought content normal.         Cognition and Memory: Cognition normal.         Judgment: Judgment normal.               ECG 12 Lead    Date/Time: 5/10/2022 9:17 AM  Performed by: Kristine Browning APRN  Authorized by: Kristine Browning APRN   Comparison: compared with previous ECG from 11/9/2021  Similar to previous ECG  Rhythm: sinus rhythm and sinus bradycardia  Rate: bradycardic  Conduction: conduction normal  ST Segments: ST segments normal  T Waves: T waves normal  QRS axis: normal    Clinical impression: normal ECG              Assessment:       Diagnosis Plan   1. Coronary artery disease involving native coronary artery of native heart without angina pectoris     2. Ischemic cardiomyopathy     3. PSVT (paroxysmal supraventricular tachycardia) (HCC)     4. Primary hypertension     5. Mixed  hyperlipidemia            Plan:       · 1/2. Coronary Artery Disease -  7/7/2021 s/p Severe mid LAD 99% lesion s/p PCI using 3.0x15mm Nicolasa KASSY. Patent OM and first diagonal disease.  Assessment  • Denies angina     Subjective - Objective  • There has been a previous stent procedure using KASSY 2007  • Current antiplatelet therapy includes aspirin 81 mg   - attended and completed cardiac rehab     He had a normal perfusion stress test in March 2020.     2.. PVST -   He has a history of PSVT, likely AVNRT.  He's had two episodes in the last 2.5 years. Dr. Haney has given him education on Valsalva maneuvers in the past.   If he has recurrences while on high dose carvedilol, we may have to consider EPS/ablation.  Doing well.     3.  HTN - stable     4.  Hyperlipidemia -   continue lipid lowering therapy, currently on atorvastatin 40 mg and followed by PCP.    RTO in 6 months with JK        As always, it has been a pleasure to participate in your patient's care. Thank you.       Sincerely,       DMITRY Tanner      Current Outpatient Medications:   •  allopurinol (ZYLOPRIM) 300 MG tablet, allopurinol 300 mg tablet  TAKE 1 TABLET DAILY, Disp: , Rfl:   •  ASPIRIN 81 PO, aspirin 81 mg tablet,delayed release  Take 1 tablet every day by oral route., Disp: , Rfl:   •  atorvastatin (LIPITOR) 40 MG tablet, atorvastatin 40 mg tablet  TAKE 1 TABLET DAILY IN THE EVENING, Disp: , Rfl:   •  carvedilol (COREG) 25 MG tablet, Take 25 mg by mouth 2 (Two) Times a Day With Meals., Disp: , Rfl:   •  dapagliflozin (Farxiga) 5 MG tablet tablet, Farxiga 5 mg tablet  Take 1 tablet every day by oral route for 30 days., Disp: , Rfl:   •  finasteride (PROSCAR) 5 MG tablet, finasteride 5 mg tablet, Disp: , Rfl:   •  glipizide (GLUCOTROL XL) 5 MG ER tablet, glipizide ER 5 mg tablet, extended release 24 hr  Take 1 tablet every day by oral route for 90 days., Disp: , Rfl:   •  lisinopril (PRINIVIL,ZESTRIL) 20 MG tablet, Take 1 tablet by mouth Daily.,  Disp: 90 tablet, Rfl: 3  •  nitroglycerin (NITROSTAT) 0.4 MG SL tablet, Place 1 tablet under the tongue Every 5 (Five) Minutes As Needed for Chest Pain. Take no more than 3 doses in 15 minutes., Disp: 25 tablet, Rfl: 1  •  omeprazole (priLOSEC) 40 MG capsule, , Disp: , Rfl:   •  SITagliptin-metFORMIN HCl ER (Janumet XR)  MG tablet, 1 tablet 2 (two) times a day., Disp: , Rfl:       Dictated utilizing Dragon dictation

## 2023-11-09 ENCOUNTER — HOSPITAL ENCOUNTER (OUTPATIENT)
Facility: HOSPITAL | Age: 78
Setting detail: OBSERVATION
Discharge: HOME | End: 2023-11-11
Attending: INTERNAL MEDICINE | Admitting: STUDENT IN AN ORGANIZED HEALTH CARE EDUCATION/TRAINING PROGRAM
Payer: MEDICARE

## 2023-11-09 DIAGNOSIS — Z95.5 PRESENCE OF DRUG COATED STENT IN CORONARY ARTERY: ICD-10-CM

## 2023-11-09 DIAGNOSIS — T82.855A STENOSIS OF CORONARY ARTERY STENT, INITIAL ENCOUNTER: ICD-10-CM

## 2023-11-09 DIAGNOSIS — I21.4 NSTEMI (NON-ST ELEVATED MYOCARDIAL INFARCTION): Primary | ICD-10-CM

## 2023-11-09 LAB
APTT PPP: 28.5 SECONDS (ref 22.7–35.4)
GLUCOSE BLDC GLUCOMTR-MCNC: 137 MG/DL (ref 70–130)
INR PPP: 1.05 (ref 0.9–1.1)
PROTHROMBIN TIME: 13.8 SECONDS (ref 11.7–14.2)

## 2023-11-09 PROCEDURE — 96366 THER/PROPH/DIAG IV INF ADDON: CPT

## 2023-11-09 PROCEDURE — 99222 1ST HOSP IP/OBS MODERATE 55: CPT | Performed by: STUDENT IN AN ORGANIZED HEALTH CARE EDUCATION/TRAINING PROGRAM

## 2023-11-09 PROCEDURE — 25010000002 HEPARIN (PORCINE) PER 1000 UNITS: Performed by: STUDENT IN AN ORGANIZED HEALTH CARE EDUCATION/TRAINING PROGRAM

## 2023-11-09 PROCEDURE — 85730 THROMBOPLASTIN TIME PARTIAL: CPT | Performed by: STUDENT IN AN ORGANIZED HEALTH CARE EDUCATION/TRAINING PROGRAM

## 2023-11-09 PROCEDURE — G0378 HOSPITAL OBSERVATION PER HR: HCPCS

## 2023-11-09 PROCEDURE — 93005 ELECTROCARDIOGRAM TRACING: CPT | Performed by: STUDENT IN AN ORGANIZED HEALTH CARE EDUCATION/TRAINING PROGRAM

## 2023-11-09 PROCEDURE — 85610 PROTHROMBIN TIME: CPT | Performed by: STUDENT IN AN ORGANIZED HEALTH CARE EDUCATION/TRAINING PROGRAM

## 2023-11-09 PROCEDURE — 82948 REAGENT STRIP/BLOOD GLUCOSE: CPT

## 2023-11-09 PROCEDURE — 93010 ELECTROCARDIOGRAM REPORT: CPT | Performed by: INTERNAL MEDICINE

## 2023-11-09 PROCEDURE — 96365 THER/PROPH/DIAG IV INF INIT: CPT

## 2023-11-09 RX ORDER — ACETAMINOPHEN 325 MG/1
650 TABLET ORAL EVERY 4 HOURS PRN
Status: DISCONTINUED | OUTPATIENT
Start: 2023-11-09 | End: 2023-11-11 | Stop reason: HOSPADM

## 2023-11-09 RX ORDER — INSULIN LISPRO 100 [IU]/ML
2-9 INJECTION, SOLUTION INTRAVENOUS; SUBCUTANEOUS
Status: DISCONTINUED | OUTPATIENT
Start: 2023-11-09 | End: 2023-11-11 | Stop reason: HOSPADM

## 2023-11-09 RX ORDER — PANTOPRAZOLE SODIUM 40 MG/1
40 TABLET, DELAYED RELEASE ORAL
Status: DISCONTINUED | OUTPATIENT
Start: 2023-11-10 | End: 2023-11-11 | Stop reason: HOSPADM

## 2023-11-09 RX ORDER — SODIUM CHLORIDE 0.9 % (FLUSH) 0.9 %
10 SYRINGE (ML) INJECTION EVERY 12 HOURS SCHEDULED
Status: DISCONTINUED | OUTPATIENT
Start: 2023-11-09 | End: 2023-11-11 | Stop reason: HOSPADM

## 2023-11-09 RX ORDER — ASPIRIN 81 MG/1
81 TABLET ORAL DAILY
Status: DISCONTINUED | OUTPATIENT
Start: 2023-11-10 | End: 2023-11-11 | Stop reason: HOSPADM

## 2023-11-09 RX ORDER — ALLOPURINOL 300 MG/1
300 TABLET ORAL DAILY
Status: DISCONTINUED | OUTPATIENT
Start: 2023-11-09 | End: 2023-11-11 | Stop reason: HOSPADM

## 2023-11-09 RX ORDER — ATORVASTATIN CALCIUM 20 MG/1
40 TABLET, FILM COATED ORAL NIGHTLY
Status: DISCONTINUED | OUTPATIENT
Start: 2023-11-09 | End: 2023-11-11 | Stop reason: HOSPADM

## 2023-11-09 RX ORDER — POLYETHYLENE GLYCOL 3350 17 G/17G
17 POWDER, FOR SOLUTION ORAL DAILY PRN
Status: DISCONTINUED | OUTPATIENT
Start: 2023-11-09 | End: 2023-11-11 | Stop reason: HOSPADM

## 2023-11-09 RX ORDER — SODIUM CHLORIDE 9 MG/ML
40 INJECTION, SOLUTION INTRAVENOUS AS NEEDED
Status: DISCONTINUED | OUTPATIENT
Start: 2023-11-09 | End: 2023-11-11 | Stop reason: HOSPADM

## 2023-11-09 RX ORDER — CARVEDILOL 12.5 MG/1
12.5 TABLET ORAL 2 TIMES DAILY WITH MEALS
Status: DISCONTINUED | OUTPATIENT
Start: 2023-11-09 | End: 2023-11-11 | Stop reason: HOSPADM

## 2023-11-09 RX ORDER — HEPARIN SODIUM 5000 [USP'U]/ML
30-44.4 INJECTION, SOLUTION INTRAVENOUS; SUBCUTANEOUS EVERY 6 HOURS PRN
Status: DISCONTINUED | OUTPATIENT
Start: 2023-11-09 | End: 2023-11-11 | Stop reason: HOSPADM

## 2023-11-09 RX ORDER — LISINOPRIL 20 MG/1
20 TABLET ORAL DAILY
Status: DISCONTINUED | OUTPATIENT
Start: 2023-11-09 | End: 2023-11-11 | Stop reason: HOSPADM

## 2023-11-09 RX ORDER — NICOTINE POLACRILEX 4 MG
15 LOZENGE BUCCAL
Status: DISCONTINUED | OUTPATIENT
Start: 2023-11-09 | End: 2023-11-11 | Stop reason: HOSPADM

## 2023-11-09 RX ORDER — CLOPIDOGREL BISULFATE 75 MG/1
75 TABLET ORAL DAILY
COMMUNITY
End: 2023-11-11 | Stop reason: HOSPADM

## 2023-11-09 RX ORDER — BISACODYL 10 MG
10 SUPPOSITORY, RECTAL RECTAL DAILY PRN
Status: DISCONTINUED | OUTPATIENT
Start: 2023-11-09 | End: 2023-11-11 | Stop reason: HOSPADM

## 2023-11-09 RX ORDER — SODIUM CHLORIDE 0.9 % (FLUSH) 0.9 %
10 SYRINGE (ML) INJECTION AS NEEDED
Status: DISCONTINUED | OUTPATIENT
Start: 2023-11-09 | End: 2023-11-11 | Stop reason: HOSPADM

## 2023-11-09 RX ORDER — HEPARIN SODIUM 10000 [USP'U]/100ML
11.1 INJECTION, SOLUTION INTRAVENOUS
Status: DISCONTINUED | OUTPATIENT
Start: 2023-11-09 | End: 2023-11-10

## 2023-11-09 RX ORDER — IBUPROFEN 600 MG/1
1 TABLET ORAL
Status: DISCONTINUED | OUTPATIENT
Start: 2023-11-09 | End: 2023-11-11 | Stop reason: HOSPADM

## 2023-11-09 RX ORDER — FINASTERIDE 5 MG/1
5 TABLET, FILM COATED ORAL DAILY
Status: DISCONTINUED | OUTPATIENT
Start: 2023-11-10 | End: 2023-11-11 | Stop reason: HOSPADM

## 2023-11-09 RX ORDER — BISACODYL 5 MG/1
5 TABLET, DELAYED RELEASE ORAL DAILY PRN
Status: DISCONTINUED | OUTPATIENT
Start: 2023-11-09 | End: 2023-11-11 | Stop reason: HOSPADM

## 2023-11-09 RX ORDER — AMOXICILLIN 250 MG
2 CAPSULE ORAL 2 TIMES DAILY
Status: DISCONTINUED | OUTPATIENT
Start: 2023-11-09 | End: 2023-11-11 | Stop reason: HOSPADM

## 2023-11-09 RX ORDER — HEPARIN SODIUM 5000 [USP'U]/ML
44.4 INJECTION, SOLUTION INTRAVENOUS; SUBCUTANEOUS ONCE
Status: COMPLETED | OUTPATIENT
Start: 2023-11-09 | End: 2023-11-09

## 2023-11-09 RX ORDER — NITROGLYCERIN 0.4 MG/1
0.4 TABLET SUBLINGUAL
Status: DISCONTINUED | OUTPATIENT
Start: 2023-11-09 | End: 2023-11-11 | Stop reason: HOSPADM

## 2023-11-09 RX ORDER — DEXTROSE MONOHYDRATE 25 G/50ML
25 INJECTION, SOLUTION INTRAVENOUS
Status: DISCONTINUED | OUTPATIENT
Start: 2023-11-09 | End: 2023-11-11 | Stop reason: HOSPADM

## 2023-11-09 RX ADMIN — HEPARIN SODIUM 4000 UNITS: 5000 INJECTION, SOLUTION INTRAVENOUS; SUBCUTANEOUS at 19:06

## 2023-11-09 RX ADMIN — HEPARIN SODIUM 11.1 UNITS/KG/HR: 10000 INJECTION, SOLUTION INTRAVENOUS at 19:07

## 2023-11-09 RX ADMIN — DOCUSATE SODIUM 50MG AND SENNOSIDES 8.6MG 2 TABLET: 8.6; 5 TABLET, FILM COATED ORAL at 20:59

## 2023-11-09 RX ADMIN — CARVEDILOL 12.5 MG: 12.5 TABLET, FILM COATED ORAL at 20:59

## 2023-11-09 RX ADMIN — ATORVASTATIN CALCIUM 40 MG: 20 TABLET, FILM COATED ORAL at 20:59

## 2023-11-09 NOTE — Clinical Note
First balloon inflation max pressure = 20 lucila. First balloon inflation duration = 5 seconds. Second inflation of balloon - Max pressure = 20 lucila. 2nd Inflation of balloon - Duration = 5 seconds.

## 2023-11-09 NOTE — Clinical Note
First balloon inflation max pressure = 6 lucila. First balloon inflation duration = 5 seconds. Second inflation of balloon - Max pressure = 6 lucila. 2nd Inflation of balloon - Duration = 5 seconds. 2nd inflation was done at 11:08 EST. Third inflation of balloon - Max pressure = 10 lucila. 3rd Inflation of balloon - Duration = 10 seconds. 3rd inflation was done at 11:09 EST. Fourth inflation of balloon - Max pressure = 10 lucila. 4th Inflation of b alloon - Duration = 10 seconds. 4th inflation was done at 11:09 EST.

## 2023-11-09 NOTE — Clinical Note
First balloon inflation max pressure = 10 lucila. First balloon inflation duration = 15 seconds. Second inflation of balloon - Max pressure = 12 lucila. 2nd Inflation of balloon - Duration = 10 seconds. 2nd inflation was done at 11:06 EST.

## 2023-11-09 NOTE — Clinical Note
First balloon inflation max pressure = 12 lucila. First balloon inflation duration = 10 seconds. Second inflation of balloon - Max pressure = 16 lucila. 2nd Inflation of balloon - Duration = 10 seconds. 2nd inflation was done at 10:57 EST.

## 2023-11-09 NOTE — H&P
[facility]        Patient Name: Kwabena Le Jr.  Age/Sex: 78 y.o. male  : 1945  MRN: [id number]    Date of Admission: 2023  Date of Encounter Visit: 23  Encounter Provider: Qasim Mccormick MD  Referring Provider: No Known Provider  Place of Service: [facility] CARDIOLOGY  Patient Care Team:  Charlie Arreguin MD as PCP - General  Charlie Arreguin MD as PCP - Family Medicine    Subjective:     Chief Complaint: Chest pain       History of Present Illness:  Kwabena Le Jr. is a 78 y.o. male who follows with Dr. Haney in our office.  He has history of coronary disease status post PCI, ischemic cardiomyopathy with recovered EF, diabetes mellitus.    He has longstanding history of coronary artery disease.  His first stents was in  due to NSTEMI.  He underwent PCI to second obtuse marginal, which received two drug-eluting stents (Cypher 2.5x28 and 2.6x13mm).  He also underwent drug-eluting stent placement to the first obtuse marginal ( MX2 3.5x24mm) and the first diagonal ( MX2 3.5x18mm) at that time.  It stated that he had apical LAD disease which was treated medically.  He had a normal perfusion stress test in 2020.     He has a history of SVT.  He presented to the ED in 2020 with chest tightness and a rapid heart rate.  I do not have any tracings of the tachycardia, but his heart rate was reported to be 190 bpm.  He was given adenosine by EMS and converted to sinus.  He presented again in 2021 with the same.  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 Of note, the second episode occurred right after he had a relatively mild case of COVID-19.     He presented in 2021 with unstable angina; he was taken for coronary angiography which revealed patent stents in OM1 and OM2, and a 99% lesion in the LAD, which was treated with a drug eluting stent. There were no other  significant lesions. His LVEF was 35-40% with anteroapical hypokinesis.     He presented with 3 days of stuttering chest pain.  He states about 3 days ago, he had an episode of chest pressure that had at rest.  Did not like previous MIs, but it also for like previous episodes of SVT.  He had an episode and sat still, sat down, and they went away on their own.  Its been stuttering since that time.  He finally sought care at [facility] due to the ongoing pain, and his EKG was unremarkable but troponins were elevated to 300.  He was referred to [facility] for further evaluation.  At time my evaluation, he is chest pain-free and has no complaints.      Past Medical History:  Past Medical History:   Diagnosis Date    Acute gastrointestinal hemorrhage 8/10/2018    Perez's esophagus with high grade dysplasia 9/18/2018    Added automatically from request for surgery [id number]    BPH (benign prostatic hyperplasia)     CAD (coronary artery disease)     previous PCI to OM1 and OM2; cath 7/2021 with 99% mid LAD s/p 3x15mm Xience KASSY, patient stents and no other significant CAD    CKD (chronic kidney disease) stage 3, GFR 30-59 ml/min 5/4/2021    Colon polyp     GERD (gastroesophageal reflux disease)     History of transfusion     Hyperlipidemia     Hypertension     Osteoarthritis     PSVT (paroxysmal supraventricular tachycardia)     Type 2 diabetes mellitus        Past Surgical History:   Procedure Laterality Date    APPENDECTOMY      BIOPSY PENILE      couple years ago was benign of prostate    CARDIAC CATHETERIZATION      hAD FOUR STENTS    CARDIAC CATHETERIZATION N/A 7/7/2021    Procedure: Left Heart Cath;  Surgeon: Trinidad Cummings MD;  Location:  [facility];  Service: Cardiology;  Laterality: N/A;    CARDIAC CATHETERIZATION N/A 7/7/2021    Procedure: Coronary angiography;  Surgeon: Trinidad Cummings MD;  Location:  [facility];  Service: Cardiology;  Laterality: N/A;    CARDIAC  CATHETERIZATION N/A 7/7/2021    Procedure: Left ventriculography;  Surgeon: Trinidad Cummings MD;  Location:  [facility];  Service: Cardiology;  Laterality: N/A;    CARDIAC CATHETERIZATION N/A 7/7/2021    Procedure: Stent KASSY coronary;  Surgeon: Trinidad Cummings MD;  Location:  [facility];  Service: Cardiology;  Laterality: N/A;    CARDIAC SURGERY      COLONOSCOPY      10 PLUS YEARS AGO    COLONOSCOPY N/A 8/13/2018    Procedure: COLONOSCOPY and polypectomy;  Surgeon: Mili Iraheta MD;  Location: [facility] OR;  Service: Gastroenterology    CYSTOSCOPY      2 years ago    ENDOSCOPY N/A 8/11/2018    Procedure: ESOPHAGOGASTRODUODENOSCOPY w/ biopsies;  Surgeon: Mili Iraheta MD;  Location: [facility] OR;  Service: Gastroenterology    ESOPHAGOSCOPY N/A 9/21/2018    Procedure: ESOPHAGOSCOPY WITH FOCAL RADIO FREQUENCY ABLATION;  Surgeon: Jean Rasmussen MD;  [facility];  Service: Gastroenterology    TURP / TRANSURETHRAL INCISION / DRAINAGE PROSTATE      2 years ago       Home Medications:   Medications Prior to Admission   Medication Sig Dispense Refill Last Dose    allopurinol (ZYLOPRIM) 300 MG tablet allopurinol 300 mg tablet   TAKE 1 TABLET DAILY       ASPIRIN 81 PO aspirin 81 mg tablet,delayed release   Take 1 tablet every day by oral route.       atorvastatin (LIPITOR) 40 MG tablet atorvastatin 40 mg tablet   TAKE 1 TABLET DAILY IN THE EVENING       carvedilol (COREG) 25 MG tablet Take 1 tablet by mouth 2 (Two) Times a Day With Meals.       clopidogrel (PLAVIX) 75 MG tablet Take 1 tablet by mouth Daily.       dapagliflozin (Farxiga) 5 MG tablet tablet Farxiga 5 mg tablet   Take 1 tablet every day by oral route for 30 days.       finasteride (PROSCAR) 5 MG tablet finasteride 5 mg tablet       glipizide (GLUCOTROL XL) 5 MG ER tablet glipizide ER 5 mg tablet, extended release 24 hr   Take 1 tablet every day by oral route for 90 days.       lisinopril (PRINIVIL,ZESTRIL) 20 MG  tablet Take 1 tablet by mouth Daily. 90 tablet 3     nitroglycerin (NITROSTAT) 0.4 MG SL tablet Place 1 tablet under the tongue Every 5 (Five) Minutes As Needed for Chest Pain. Take no more than 3 doses in 15 minutes. 25 tablet 1     omeprazole (priLOSEC) 40 MG capsule        SITagliptin-metFORMIN HCl ER (Janumet XR)  MG tablet 1 tablet 2 (two) times a day.          Allergies:  No Known Allergies    Past Social History:  Social History     Socioeconomic History    Marital status:     Number of children: 2   Tobacco Use    Smoking status: Never    Smokeless tobacco: Never    Tobacco comments:     daily caffiene   Vaping Use    Vaping Use: Never used   Substance and Sexual Activity    Alcohol use: No    Drug use: No    Sexual activity: Defer     Partners: Male       Past Family History:  Family History   Problem Relation Age of Onset    Hypertension Mother     Arthritis Mother     Heart disease Father     Heart disease Sister     Hypertension Maternal Aunt     Hypertension Maternal Uncle     Heart disease Maternal Grandfather     Colon cancer Neg Hx     Colon polyps Neg Hx        REVIEW OF SYSTEMS:   14 point ROS was performed and is negative except as outlined in HPI          Objective:   Temp:  [97.6 °F (36.4 °C)] 97.6 °F (36.4 °C)  Heart Rate:  [52] 52  BP: (164)/(77) 164/77   No intake or output data in the 24 hours ending 11/09/23 1712  There is no height or weight on file to calculate BMI.  There were no vitals filed for this visit.  Weight change:     General Appearance:    Alert, cooperative, in no acute distress   Head:    Normocephalic, without obvious abnormality, atraumatic   Eyes:            Lids and lashes normal, conjunctivae and sclerae normal, no   icterus, no pallor, corneas clear, PERRLA   Ears:    Ears appear intact with no abnormalities noted   Throat:   No oral lesions, no thrush, oral mucosa moist   Neck:   No adenopathy, supple, trachea midline, no thyromegaly, no   carotid bruit,  "no JVD   Back:     No kyphosis present, no scoliosis present, no skin lesions, erythema or scars, no tenderness to percussion or palpation, range of motion normal   Lungs:     Clear to auscultation,respirations regular, even and unlabored    Heart:    Regular rhythm and normal rate, normal S1 and S2, no murmur, no gallop, no rub, no click   Chest Wall:    No abnormalities observed   Abdomen:     Normal bowel sounds, no masses, no organomegaly, soft, nontender, nondistended, no guarding, no rebound  tenderness   Extremities:   Moves all extremities well, no edema, no cyanosis, no redness   Pulses:   Pulses palpable and equal bilaterally. Normal radial, carotid, femoral, dorsalis pedis and posterior tibial pulses bilaterally. Normal abdominal aorta   Skin:  Psychiatric:   No bleeding, bruising or rash    Alert and oriented x 3, normal mood and affect     Lab Review:                   Results from last 7 days   Lab Units 11/09/23  1128   MAGNESIUM mg/dL 2.0           Invalid input(s): \"LDLCALC\"            Reviewed his labs from [facility] which revealed a high sensitive troponin of 391, mildly elevated BNP of 157, creatinine of 1.19 with mild hyperglycemia otherwise unremarkable.  D-dimer was normal.    Echo EF Estimated  No results found for: \"ECHOEFEST\"    EKG:   I personally viewed and interpreted the patient's EKG.  His EKG shows sinus bradycardia with low voltage in the limb leads.  No acute ST segment changes noted.  There are inferior Q waves noted.  These do appear new when compared to prior EKG on May 10, 2022    Imaging:  Imaging Results (Most Recent)       None                Assessment:     Active Hospital Problems    Diagnosis  POA    **NSTEMI (non-ST elevated myocardial infarction) [I21.4]  Yes    Ischemic cardiomyopathy [I25.5]  Yes    CKD (chronic kidney disease) stage 3, GFR 30-59 ml/min [N18.30]  Yes    CAD (coronary artery disease) [I25.10]  Yes    Type 2 diabetes mellitus [E11.9]  Yes    " Hypertension [I10]  Yes    PSVT (paroxysmal supraventricular tachycardia) [I47.10]  Yes    Hyperlipidemia [E78.5]  Yes      Resolved Hospital Problems   No resolved problems to display.          Plan:     Chest pain, NSTEMI type I: D-dimer negative, likely primary plaque rupture event.  Currently chest pain-free.  EKG nonischemic.  Arrange for left heart cath in the morning.  N.p.o. midnight  Start heparin drip  Aspirin 81  Patient was previously on Plavix 75 and took his last dose this morning.  Will defer to the internationalist whether or not alternate antiplatelets should be considered given his recurrent events  Lipitor 40, recheck lipids  Continue carvedilol reduced dose to 12.5 twice daily, he does have some mild resting bradycardia  Lisinopril 20  Recheck echo post PCI  Diabetes mellitus: Hold oral hypoglycemics, start sliding scale  Hypertension: Well-controlled.  Continue regimen per above        Qasim Mccormick MD  [facility]  11/09/23  17:12 EST      Part of this note may be an electronic transcription/translation of spoken language to printed text using the Dragon Dictation System.

## 2023-11-09 NOTE — Clinical Note
Hemostasis started on the right radial artery. R-Band was used in achieving hemostasis. Radial compression device applied to vessel. Hemostasis achieved successfully. Closure device additional comment: Vasc band __ cc air

## 2023-11-09 NOTE — Clinical Note
First balloon inflation max pressure = 10 lucila. First balloon inflation duration = 5 seconds. Second inflation of balloon - Max pressure = 4 lucila. 2nd Inflation of balloon - Duration = 10 seconds. 2nd inflation was done at 10:54 EST.

## 2023-11-09 NOTE — Clinical Note
First balloon inflation max pressure = 16 lucila. First balloon inflation duration = 5 seconds. Second inflation of balloon - Max pressure = 20 lucila. 2nd Inflation of balloon - Duration = 5 seconds.

## 2023-11-09 NOTE — Clinical Note
First balloon inflation max pressure = 6 lucila. First balloon inflation duration = 5 seconds. Second inflation of balloon - Max pressure = 6 lucila. 2nd Inflation of balloon - Duration = 5 seconds. 2nd inflation was done at 11:01 EST. Third inflation of balloon - Max pressure = 6 lucila. 3rd Inflation of balloon - Duration = 5 seconds. 3rd inflation was done at 11:01 EST.

## 2023-11-10 ENCOUNTER — APPOINTMENT (OUTPATIENT)
Dept: CARDIOLOGY | Facility: HOSPITAL | Age: 78
End: 2023-11-10
Payer: MEDICARE

## 2023-11-10 LAB
ALBUMIN SERPL-MCNC: 4.2 G/DL (ref 3.5–5.2)
ALBUMIN/GLOB SERPL: 2 G/DL
ALP SERPL-CCNC: 53 U/L (ref 39–117)
ALT SERPL W P-5'-P-CCNC: 17 U/L (ref 1–41)
ANION GAP SERPL CALCULATED.3IONS-SCNC: 12 MMOL/L (ref 5–15)
AORTIC DIMENSIONLESS INDEX: 1 (DI)
APTT PPP: 47.7 SECONDS (ref 22.7–35.4)
AST SERPL-CCNC: 14 U/L (ref 1–40)
BH CV ECHO MEAS - AO MAX PG: 5.9 MMHG
BH CV ECHO MEAS - AO MEAN PG: 2.8 MMHG
BH CV ECHO MEAS - AO V2 MAX: 121 CM/SEC
BH CV ECHO MEAS - AO V2 VTI: 22.7 CM
BH CV ECHO MEAS - AVA(I,D): 3.8 CM2
BH CV ECHO MEAS - EDV(CUBED): 110.6 ML
BH CV ECHO MEAS - EDV(MOD-SP2): 66 ML
BH CV ECHO MEAS - EDV(MOD-SP4): 87 ML
BH CV ECHO MEAS - EF(MOD-BP): 66.4 %
BH CV ECHO MEAS - EF(MOD-SP2): 69.7 %
BH CV ECHO MEAS - EF(MOD-SP4): 62.1 %
BH CV ECHO MEAS - ESV(CUBED): 30.6 ML
BH CV ECHO MEAS - ESV(MOD-SP2): 20 ML
BH CV ECHO MEAS - ESV(MOD-SP4): 33 ML
BH CV ECHO MEAS - FS: 34.9 %
BH CV ECHO MEAS - IVS/LVPW: 1 CM
BH CV ECHO MEAS - IVSD: 1.1 CM
BH CV ECHO MEAS - LAT PEAK E' VEL: 9.4 CM/SEC
BH CV ECHO MEAS - LV DIASTOLIC VOL/BSA (35-75): 41.8 CM2
BH CV ECHO MEAS - LV MASS(C)D: 194 GRAMS
BH CV ECHO MEAS - LV MAX PG: 5 MMHG
BH CV ECHO MEAS - LV MEAN PG: 2 MMHG
BH CV ECHO MEAS - LV SYSTOLIC VOL/BSA (12-30): 15.9 CM2
BH CV ECHO MEAS - LV V1 MAX: 112 CM/SEC
BH CV ECHO MEAS - LV V1 VTI: 23.2 CM
BH CV ECHO MEAS - LVIDD: 4.8 CM
BH CV ECHO MEAS - LVIDS: 3.1 CM
BH CV ECHO MEAS - LVOT AREA: 3.7 CM2
BH CV ECHO MEAS - LVOT DIAM: 2.17 CM
BH CV ECHO MEAS - LVPWD: 1.1 CM
BH CV ECHO MEAS - MED PEAK E' VEL: 7.7 CM/SEC
BH CV ECHO MEAS - MV A MAX VEL: 71.6 CM/SEC
BH CV ECHO MEAS - MV DEC SLOPE: 401.5 CM/SEC2
BH CV ECHO MEAS - MV DEC TIME: 0.29 SEC
BH CV ECHO MEAS - MV E MAX VEL: 50.6 CM/SEC
BH CV ECHO MEAS - MV E/A: 0.71
BH CV ECHO MEAS - MV MAX PG: 2.27 MMHG
BH CV ECHO MEAS - MV MEAN PG: 0.69 MMHG
BH CV ECHO MEAS - MV P1/2T: 56 MSEC
BH CV ECHO MEAS - MV V2 VTI: 27.9 CM
BH CV ECHO MEAS - MVA(P1/2T): 3.9 CM2
BH CV ECHO MEAS - MVA(VTI): 3.1 CM2
BH CV ECHO MEAS - PA ACC TIME: 0.1 SEC
BH CV ECHO MEAS - PA V2 MAX: 107 CM/SEC
BH CV ECHO MEAS - QP/QS: 0.77
BH CV ECHO MEAS - RAP SYSTOLE: 3 MMHG
BH CV ECHO MEAS - RV MAX PG: 1.59 MMHG
BH CV ECHO MEAS - RV V1 MAX: 63 CM/SEC
BH CV ECHO MEAS - RV V1 VTI: 12 CM
BH CV ECHO MEAS - RVOT DIAM: 2.6 CM
BH CV ECHO MEAS - RVSP: 35 MMHG
BH CV ECHO MEAS - SI(MOD-SP2): 22.1 ML/M2
BH CV ECHO MEAS - SI(MOD-SP4): 26 ML/M2
BH CV ECHO MEAS - SV(LVOT): 85.4 ML
BH CV ECHO MEAS - SV(MOD-SP2): 46 ML
BH CV ECHO MEAS - SV(MOD-SP4): 54 ML
BH CV ECHO MEAS - SV(RVOT): 65.7 ML
BH CV ECHO MEAS - TR MAX PG: 32.8 MMHG
BH CV ECHO MEAS - TR MAX VEL: 286.6 CM/SEC
BH CV ECHO MEASUREMENTS AVERAGE E/E' RATIO: 5.92
BH CV XLRA - RV BASE: 3.4 CM
BH CV XLRA - RV LENGTH: 7 CM
BH CV XLRA - RV MID: 2.6 CM
BILIRUB SERPL-MCNC: 0.4 MG/DL (ref 0–1.2)
BUN SERPL-MCNC: 24 MG/DL (ref 8–23)
BUN/CREAT SERPL: 20.5 (ref 7–25)
CALCIUM SPEC-SCNC: 9.4 MG/DL (ref 8.6–10.5)
CHLORIDE SERPL-SCNC: 102 MMOL/L (ref 98–107)
CHOLEST SERPL-MCNC: 124 MG/DL (ref 0–200)
CO2 SERPL-SCNC: 23 MMOL/L (ref 22–29)
CREAT SERPL-MCNC: 1.17 MG/DL (ref 0.76–1.27)
EGFRCR SERPLBLD CKD-EPI 2021: 64.6 ML/MIN/1.73
GLOBULIN UR ELPH-MCNC: 2.1 GM/DL
GLUCOSE BLDC GLUCOMTR-MCNC: 101 MG/DL (ref 70–130)
GLUCOSE BLDC GLUCOMTR-MCNC: 120 MG/DL (ref 70–130)
GLUCOSE BLDC GLUCOMTR-MCNC: 132 MG/DL (ref 70–130)
GLUCOSE BLDC GLUCOMTR-MCNC: 154 MG/DL (ref 70–130)
GLUCOSE SERPL-MCNC: 172 MG/DL (ref 65–99)
HBA1C MFR BLD: 7.2 % (ref 4.8–5.6)
HDLC SERPL-MCNC: 38 MG/DL (ref 40–60)
LDLC SERPL CALC-MCNC: 50 MG/DL (ref 0–100)
LDLC/HDLC SERPL: 1.08 {RATIO}
POTASSIUM SERPL-SCNC: 4.1 MMOL/L (ref 3.5–5.2)
PROT SERPL-MCNC: 6.3 G/DL (ref 6–8.5)
QT INTERVAL: 436 MS
QTC INTERVAL: 398 MS
SINUS: 3.5 CM
SODIUM SERPL-SCNC: 137 MMOL/L (ref 136–145)
TRIGL SERPL-MCNC: 224 MG/DL (ref 0–150)
VLDLC SERPL-MCNC: 36 MG/DL (ref 5–40)

## 2023-11-10 PROCEDURE — 99232 SBSQ HOSP IP/OBS MODERATE 35: CPT | Performed by: STUDENT IN AN ORGANIZED HEALTH CARE EDUCATION/TRAINING PROGRAM

## 2023-11-10 PROCEDURE — 99152 MOD SED SAME PHYS/QHP 5/>YRS: CPT | Performed by: STUDENT IN AN ORGANIZED HEALTH CARE EDUCATION/TRAINING PROGRAM

## 2023-11-10 PROCEDURE — 93458 L HRT ARTERY/VENTRICLE ANGIO: CPT | Performed by: STUDENT IN AN ORGANIZED HEALTH CARE EDUCATION/TRAINING PROGRAM

## 2023-11-10 PROCEDURE — C1769 GUIDE WIRE: HCPCS | Performed by: STUDENT IN AN ORGANIZED HEALTH CARE EDUCATION/TRAINING PROGRAM

## 2023-11-10 PROCEDURE — C1753 CATH, INTRAVAS ULTRASOUND: HCPCS | Performed by: STUDENT IN AN ORGANIZED HEALTH CARE EDUCATION/TRAINING PROGRAM

## 2023-11-10 PROCEDURE — C1725 CATH, TRANSLUMIN NON-LASER: HCPCS | Performed by: STUDENT IN AN ORGANIZED HEALTH CARE EDUCATION/TRAINING PROGRAM

## 2023-11-10 PROCEDURE — G0378 HOSPITAL OBSERVATION PER HR: HCPCS

## 2023-11-10 PROCEDURE — C1894 INTRO/SHEATH, NON-LASER: HCPCS | Performed by: STUDENT IN AN ORGANIZED HEALTH CARE EDUCATION/TRAINING PROGRAM

## 2023-11-10 PROCEDURE — 85347 COAGULATION TIME ACTIVATED: CPT

## 2023-11-10 PROCEDURE — 25010000002 MIDAZOLAM PER 1 MG: Performed by: STUDENT IN AN ORGANIZED HEALTH CARE EDUCATION/TRAINING PROGRAM

## 2023-11-10 PROCEDURE — 25510000001 IOPAMIDOL PER 1 ML: Performed by: STUDENT IN AN ORGANIZED HEALTH CARE EDUCATION/TRAINING PROGRAM

## 2023-11-10 PROCEDURE — 25810000003 SODIUM CHLORIDE 0.9 % SOLUTION: Performed by: STUDENT IN AN ORGANIZED HEALTH CARE EDUCATION/TRAINING PROGRAM

## 2023-11-10 PROCEDURE — 92928 PRQ TCAT PLMT NTRAC ST 1 LES: CPT | Performed by: STUDENT IN AN ORGANIZED HEALTH CARE EDUCATION/TRAINING PROGRAM

## 2023-11-10 PROCEDURE — C1887 CATHETER, GUIDING: HCPCS | Performed by: STUDENT IN AN ORGANIZED HEALTH CARE EDUCATION/TRAINING PROGRAM

## 2023-11-10 PROCEDURE — 85730 THROMBOPLASTIN TIME PARTIAL: CPT | Performed by: INTERNAL MEDICINE

## 2023-11-10 PROCEDURE — 93306 TTE W/DOPPLER COMPLETE: CPT

## 2023-11-10 PROCEDURE — 25010000002 HEPARIN (PORCINE) PER 1000 UNITS: Performed by: STUDENT IN AN ORGANIZED HEALTH CARE EDUCATION/TRAINING PROGRAM

## 2023-11-10 PROCEDURE — 93306 TTE W/DOPPLER COMPLETE: CPT | Performed by: STUDENT IN AN ORGANIZED HEALTH CARE EDUCATION/TRAINING PROGRAM

## 2023-11-10 PROCEDURE — 25010000002 PHENYLEPHRINE 10 MG/ML SOLUTION: Performed by: STUDENT IN AN ORGANIZED HEALTH CARE EDUCATION/TRAINING PROGRAM

## 2023-11-10 PROCEDURE — 96366 THER/PROPH/DIAG IV INF ADDON: CPT

## 2023-11-10 PROCEDURE — 96376 TX/PRO/DX INJ SAME DRUG ADON: CPT

## 2023-11-10 PROCEDURE — 25010000002 FENTANYL CITRATE (PF) 50 MCG/ML SOLUTION: Performed by: STUDENT IN AN ORGANIZED HEALTH CARE EDUCATION/TRAINING PROGRAM

## 2023-11-10 PROCEDURE — C1874 STENT, COATED/COV W/DEL SYS: HCPCS | Performed by: STUDENT IN AN ORGANIZED HEALTH CARE EDUCATION/TRAINING PROGRAM

## 2023-11-10 PROCEDURE — 80053 COMPREHEN METABOLIC PANEL: CPT | Performed by: STUDENT IN AN ORGANIZED HEALTH CARE EDUCATION/TRAINING PROGRAM

## 2023-11-10 PROCEDURE — 80061 LIPID PANEL: CPT | Performed by: STUDENT IN AN ORGANIZED HEALTH CARE EDUCATION/TRAINING PROGRAM

## 2023-11-10 PROCEDURE — C9600 PERC DRUG-EL COR STENT SING: HCPCS | Performed by: STUDENT IN AN ORGANIZED HEALTH CARE EDUCATION/TRAINING PROGRAM

## 2023-11-10 PROCEDURE — 92978 ENDOLUMINL IVUS OCT C 1ST: CPT | Performed by: STUDENT IN AN ORGANIZED HEALTH CARE EDUCATION/TRAINING PROGRAM

## 2023-11-10 PROCEDURE — 83036 HEMOGLOBIN GLYCOSYLATED A1C: CPT | Performed by: STUDENT IN AN ORGANIZED HEALTH CARE EDUCATION/TRAINING PROGRAM

## 2023-11-10 PROCEDURE — 99153 MOD SED SAME PHYS/QHP EA: CPT | Performed by: STUDENT IN AN ORGANIZED HEALTH CARE EDUCATION/TRAINING PROGRAM

## 2023-11-10 PROCEDURE — 82948 REAGENT STRIP/BLOOD GLUCOSE: CPT

## 2023-11-10 PROCEDURE — 25510000001 PERFLUTREN (DEFINITY) 8.476 MG IN SODIUM CHLORIDE (PF) 0.9 % 10 ML INJECTION: Performed by: STUDENT IN AN ORGANIZED HEALTH CARE EDUCATION/TRAINING PROGRAM

## 2023-11-10 DEVICE — XIENCE SKYPOINT™ EVEROLIMUS ELUTING CORONARY STENT SYSTEM 2.75 MM X 15 MM / RAPID-EXCHANGE
Type: IMPLANTABLE DEVICE | Status: FUNCTIONAL
Brand: XIENCE SKYPOINT™

## 2023-11-10 RX ORDER — FENTANYL CITRATE 50 UG/ML
INJECTION, SOLUTION INTRAMUSCULAR; INTRAVENOUS
Status: DISCONTINUED | OUTPATIENT
Start: 2023-11-10 | End: 2023-11-10 | Stop reason: HOSPADM

## 2023-11-10 RX ORDER — VERAPAMIL HYDROCHLORIDE 2.5 MG/ML
INJECTION, SOLUTION INTRAVENOUS
Status: DISCONTINUED | OUTPATIENT
Start: 2023-11-10 | End: 2023-11-10 | Stop reason: HOSPADM

## 2023-11-10 RX ORDER — MIDAZOLAM HYDROCHLORIDE 1 MG/ML
INJECTION INTRAMUSCULAR; INTRAVENOUS
Status: DISCONTINUED | OUTPATIENT
Start: 2023-11-10 | End: 2023-11-10 | Stop reason: HOSPADM

## 2023-11-10 RX ORDER — LIDOCAINE HYDROCHLORIDE 20 MG/ML
INJECTION, SOLUTION INFILTRATION; PERINEURAL
Status: DISCONTINUED | OUTPATIENT
Start: 2023-11-10 | End: 2023-11-10 | Stop reason: HOSPADM

## 2023-11-10 RX ORDER — HEPARIN SODIUM 1000 [USP'U]/ML
INJECTION, SOLUTION INTRAVENOUS; SUBCUTANEOUS
Status: DISCONTINUED | OUTPATIENT
Start: 2023-11-10 | End: 2023-11-10 | Stop reason: HOSPADM

## 2023-11-10 RX ORDER — ASPIRIN 325 MG
TABLET ORAL
Status: DISCONTINUED | OUTPATIENT
Start: 2023-11-10 | End: 2023-11-10 | Stop reason: HOSPADM

## 2023-11-10 RX ORDER — NICARDIPINE HYDROCHLORIDE 2.5 MG/ML
INJECTION INTRAVENOUS
Status: DISCONTINUED | OUTPATIENT
Start: 2023-11-10 | End: 2023-11-10 | Stop reason: HOSPADM

## 2023-11-10 RX ORDER — PHENYLEPHRINE HYDROCHLORIDE 10 MG/ML
INJECTION INTRAVENOUS
Status: DISCONTINUED | OUTPATIENT
Start: 2023-11-10 | End: 2023-11-10 | Stop reason: HOSPADM

## 2023-11-10 RX ORDER — ACETAMINOPHEN 325 MG/1
650 TABLET ORAL EVERY 4 HOURS PRN
Status: DISCONTINUED | OUTPATIENT
Start: 2023-11-10 | End: 2023-11-11 | Stop reason: HOSPADM

## 2023-11-10 RX ORDER — SODIUM CHLORIDE 9 MG/ML
INJECTION, SOLUTION INTRAVENOUS
Status: COMPLETED | OUTPATIENT
Start: 2023-11-10 | End: 2023-11-10

## 2023-11-10 RX ADMIN — ACETAMINOPHEN 650 MG: 325 TABLET ORAL at 18:07

## 2023-11-10 RX ADMIN — ALLOPURINOL 300 MG: 300 TABLET ORAL at 08:37

## 2023-11-10 RX ADMIN — ATORVASTATIN CALCIUM 40 MG: 20 TABLET, FILM COATED ORAL at 21:14

## 2023-11-10 RX ADMIN — CARVEDILOL 12.5 MG: 12.5 TABLET, FILM COATED ORAL at 17:08

## 2023-11-10 RX ADMIN — LISINOPRIL 20 MG: 20 TABLET ORAL at 08:36

## 2023-11-10 RX ADMIN — FINASTERIDE 5 MG: 5 TABLET, FILM COATED ORAL at 08:37

## 2023-11-10 RX ADMIN — Medication 10 ML: at 08:38

## 2023-11-10 RX ADMIN — ASPIRIN 81 MG: 81 TABLET, COATED ORAL at 08:40

## 2023-11-10 RX ADMIN — Medication 10 ML: at 21:14

## 2023-11-10 RX ADMIN — CARVEDILOL 12.5 MG: 12.5 TABLET, FILM COATED ORAL at 08:37

## 2023-11-10 RX ADMIN — PERFLUTREN 2 ML: 6.52 INJECTION, SUSPENSION INTRAVENOUS at 18:48

## 2023-11-10 RX ADMIN — TICAGRELOR 90 MG: 90 TABLET ORAL at 22:25

## 2023-11-10 RX ADMIN — HEPARIN SODIUM 4000 UNITS: 5000 INJECTION INTRAVENOUS; SUBCUTANEOUS at 01:56

## 2023-11-10 NOTE — PROGRESS NOTES
"    Patient Name: Kwabena Le Jr.  :1945  78 y.o.      Patient Care Team:  Charlie Arreguin MD as PCP - General  Charlie Arreguin MD as PCP - Family Medicine    Chief Complaint:   Chest pain, NSTEMI    Interval History:   No further episodes of chest pain.    Objective   Vital Signs  Temp:  [97.6 °F (36.4 °C)-98.5 °F (36.9 °C)] 98.2 °F (36.8 °C)  Heart Rate:  [49-53] 53  Resp:  [10-20] 14  BP: (109-164)/(64-77) 123/74    Intake/Output Summary (Last 24 hours) at 11/10/2023 1212  Last data filed at 11/10/2023 1136  Gross per 24 hour   Intake 721.94 ml   Output --   Net 721.94 ml     Flowsheet Rows      Flowsheet Row First Filed Value   Admission Height 180.3 cm (71\") Documented at 2023 1524   Admission Weight 90.1 kg (198 lb 9.6 oz) Documented at 2023 1524            GEN: no distress, alert and oriented  HEENT: NACT, EOMI, moist mucous membranes  Lungs: CTAB, no wheezes, rales or rhonchi  CV: normal rate, regular rhythm, normal S1, S2, no murmurs, +2 radial pulses b/l, no carotid bruit  Abdomen: soft, nontender, nondistended, NABS  Extremities: no edema  Skin: no rash, warm, dry  Heme/Lymph: no bruising  Psych: organized thought, normal behavior and affect    Results Review:                Results from last 7 days   Lab Units 11/10/23  0046 23  1842   INR   --  1.05   APTT seconds 47.7* 28.5     Results from last 7 days   Lab Units 23  1128   MAGNESIUM mg/dL 2.0         Results from last 7 days   Lab Units 23  1128   BNP pg/mL 157*           Medication Review:   [MAR Hold] allopurinol, 300 mg, Oral, Daily  [MAR Hold] aspirin, 81 mg, Oral, Daily  [MAR Hold] atorvastatin, 40 mg, Oral, Nightly  carvedilol, 12.5 mg, Oral, BID With Meals  [MAR Hold] finasteride, 5 mg, Oral, Daily  insulin lispro, 2-9 Units, Subcutaneous, 4x Daily AC & at Bedtime  lisinopril, 20 mg, Oral, Daily  [MAR Hold] pantoprazole, 40 mg, Oral, Q AM  senna-docusate sodium, 2 tablet, Oral, BID  sodium " chloride, 10 mL, Intravenous, Q12H  ticagrelor, 90 mg, Oral, BID         heparin, 11.1 Units/kg/hr, Last Rate: Stopped (11/10/23 1020)        Assessment & Plan   #NSTEMI  #CAD status post prior PCI  #Ischemic heart myopathy with recovered EF  #Diabetes    78-year-old man, followed by Dr. Haney, with CAD status post prior PCI, HFrEF with recovered EF, diabetes, who presented with chest pain found to have elevated troponins, transferred here for further evaluation.  We underwent left heart catheterization this morning which revealed a 95% in-stent restenosis of a first diagonal stent.  He received PCI with a 3.25 mm cutting balloon to the proximal area of ISR as well as a 2.75 x 15mm Xience Skypoint drug-eluting stent to the distal edge.  The stent was postdilated throughout with a 3.5 mm NC.  IVUS revealed adequate stent expansion and apposition so a second stent was not placed in the proximal diagonal.    - Switch Plavix to ticagrelor 90 mg twice daily  - Continue aspirin 81 mg daily  - Continue lisinopril 20 mg daily, carvedilol 12.5 mg twice daily, atorvastatin 40 mg nightly  - Echocardiogram    Alfonso Kent MD  [facility]  11/10/23  12:12 EST

## 2023-11-10 NOTE — PLAN OF CARE
Goal Outcome Evaluation:  Plan of Care Reviewed With: (P) patient        Progress: (P) improving  Outcome Evaluation: (P) Pt is SR/SB on tele. Hr is 50-80s. -130s. Pt had scheduled cardiac cath today in L radial with one stent placed in diagonal. VASC band on currently with 4cc, hopeful to remove before end of shift. Site is clean and dry. Pt has no further complaints as of the present.

## 2023-11-10 NOTE — PLAN OF CARE
Goal Outcome Evaluation:  Plan of Care Reviewed With: patient        Progress: no change  Outcome Evaluation: A/O, no complaints of chest pain, NPO since MN, LHC scheduled for today, IVF infusing, SB on the monitor, heparin drip infusing.

## 2023-11-11 ENCOUNTER — READMISSION MANAGEMENT (OUTPATIENT)
Dept: CALL CENTER | Facility: HOSPITAL | Age: 78
End: 2023-11-11
Payer: MEDICARE

## 2023-11-11 VITALS
SYSTOLIC BLOOD PRESSURE: 129 MMHG | HEART RATE: 53 BPM | TEMPERATURE: 97.3 F | RESPIRATION RATE: 14 BRPM | OXYGEN SATURATION: 97 % | BODY MASS INDEX: 27.16 KG/M2 | WEIGHT: 194 LBS | DIASTOLIC BLOOD PRESSURE: 69 MMHG | HEIGHT: 71 IN

## 2023-11-11 LAB — GLUCOSE BLDC GLUCOMTR-MCNC: 141 MG/DL (ref 70–130)

## 2023-11-11 PROCEDURE — 99238 HOSP IP/OBS DSCHRG MGMT 30/<: CPT | Performed by: STUDENT IN AN ORGANIZED HEALTH CARE EDUCATION/TRAINING PROGRAM

## 2023-11-11 PROCEDURE — G0378 HOSPITAL OBSERVATION PER HR: HCPCS

## 2023-11-11 PROCEDURE — 82948 REAGENT STRIP/BLOOD GLUCOSE: CPT

## 2023-11-11 PROCEDURE — 93005 ELECTROCARDIOGRAM TRACING: CPT | Performed by: STUDENT IN AN ORGANIZED HEALTH CARE EDUCATION/TRAINING PROGRAM

## 2023-11-11 RX ORDER — CARVEDILOL 12.5 MG/1
12.5 TABLET ORAL 2 TIMES DAILY WITH MEALS
Qty: 60 TABLET | Refills: 11 | Status: SHIPPED | OUTPATIENT
Start: 2023-11-11 | End: 2024-11-10

## 2023-11-11 RX ADMIN — Medication 10 ML: at 09:36

## 2023-11-11 RX ADMIN — FINASTERIDE 5 MG: 5 TABLET, FILM COATED ORAL at 09:35

## 2023-11-11 RX ADMIN — ALLOPURINOL 300 MG: 300 TABLET ORAL at 09:35

## 2023-11-11 RX ADMIN — LISINOPRIL 20 MG: 20 TABLET ORAL at 09:35

## 2023-11-11 RX ADMIN — TICAGRELOR 90 MG: 90 TABLET ORAL at 09:35

## 2023-11-11 RX ADMIN — CARVEDILOL 12.5 MG: 12.5 TABLET, FILM COATED ORAL at 09:35

## 2023-11-11 RX ADMIN — ASPIRIN 81 MG: 81 TABLET, COATED ORAL at 09:35

## 2023-11-11 RX ADMIN — PANTOPRAZOLE SODIUM 40 MG: 40 TABLET, DELAYED RELEASE ORAL at 05:14

## 2023-11-11 NOTE — DISCHARGE SUMMARY
Kwabena Le Jr.  [id number]    Date of Admit: 11/9/2023  Date of Discharge:  11/11/2023    Discharge Diagnosis:  Active Hospital Problems    Diagnosis  POA    **NSTEMI (non-ST elevated myocardial infarction) [I21.4]  Yes    Ischemic cardiomyopathy [I25.5]  Yes    CKD (chronic kidney disease) stage 3, GFR 30-59 ml/min [N18.30]  Yes    CAD (coronary artery disease) [I25.10]  Yes    Type 2 diabetes mellitus [E11.9]  Yes    Hypertension [I10]  Yes    PSVT (paroxysmal supraventricular tachycardia) [I47.10]  Yes    Hyperlipidemia [E78.5]  Yes      Resolved Hospital Problems   No resolved problems to display.       Hospital Course: Mr. Le presented with acute onset chest pain with troponin elevation consistent with NSTEMI.  He was taken for left heart cath right radial approach which revealed in-stent restenosis of the first diagonal, and underwent successful PCI.  Chest pain resolved post PCI.    Patient had an uncomplicated recovery and was discharged.  He was changed from Plavix to ticagrelor given his higher risk coronary disease.  He was recommended to continue on his home high intensity statin therapy.  Echocardiogram was performed showing normal LVEF without any evidence of wall motion abnormalities.  He was ambulating well without complaint day of discharge.    He was noted to have asymptomatic bradycardia on his high dose 25 mg twice daily of carvedilol.  This was decreased to 12.5 twice daily and can be considered to increase as an outpatient.  He will be arranged for outpatient follow-up in the stent clinic and will be arranged for cardiac rehab as well.    Physical exam day of discharge:  P 53 beats per minute  /69  Respirations 14/min with pulse ox 97% on room air    GEN: no distress, alert and oriented  HEENT: NACT, EOMI, moist mucous membranes  Lungs: CTAB, no wheezes, rales or rhonchi  CV: normal rate, regular rhythm, normal S1, S2, no murmurs, +2 radial pulses b/l, no carotid bruit  Abdomen:  soft, nontender, nondistended, NABS  Extremities: no edema  Skin: no rash, warm, dry  Heme/Lymph: no bruising  Psych: organized thought, normal behavior and affect    Procedures Performed  Procedure(s):  Left Heart Cath  Percutaneous Coronary Intervention  Intravascular Ultrasound  Stent KASSY coronary  Coronary angiography    Cardiac catheterization November 10, 2023:  Successful PCI to severe ISR of first diagonal with a 3.25 mm Quinton balloon and a 2.75 x 15 mm Xience Skypoint drug-eluting stent (postdilated proximally with a 3.5 mm NC)  Mild ISR of LAD, ramus and circumflex stents.  Low left-sided filling pressures.  Continue aspirin 81 mg daily  Switch Plavix to ticagrelor 90 mg twice daily  Echocardiogram     Echocardiogram November 10, 2023:    Left ventricular systolic function is normal. Calculated left ventricular EF = 66.4% Left ventricular ejection fraction appears to be 66 - 70%.    Left ventricular diastolic function was normal.    Estimated right ventricular systolic pressure from tricuspid regurgitation is normal (<35 mmHg). Calculated right ventricular systolic pressure from tricuspid regurgitation is 35 mmHg.          Consults       No orders found from 10/11/2023 to 11/10/2023.            Discharge Medications     Your medication list        START taking these medications        Instructions Last Dose Given Next Dose Due   ticagrelor 90 MG tablet tablet  Commonly known as: BRILINTA      Take 1 tablet by mouth 2 (Two) Times a Day.              CHANGE how you take these medications        Instructions Last Dose Given Next Dose Due   carvedilol 12.5 MG tablet  Commonly known as: COREG  What changed:   medication strength  how much to take      Take 1 tablet by mouth 2 (Two) Times a Day With Meals.              CONTINUE taking these medications        Instructions Last Dose Given Next Dose Due   allopurinol 300 MG tablet  Commonly known as: ZYLOPRIM      allopurinol 300 mg tablet   TAKE 1 TABLET  DAILY       ASPIRIN 81 PO      aspirin 81 mg tablet,delayed release   Take 1 tablet every day by oral route.       atorvastatin 40 MG tablet  Commonly known as: LIPITOR      atorvastatin 40 mg tablet   TAKE 1 TABLET DAILY IN THE EVENING       Farxiga 5 MG tablet tablet  Generic drug: dapagliflozin      Farxiga 5 mg tablet   Take 1 tablet every day by oral route for 30 days.       finasteride 5 MG tablet  Commonly known as: PROSCAR      finasteride 5 mg tablet       glipizide 5 MG ER tablet  Commonly known as: GLUCOTROL XL      glipizide ER 5 mg tablet, extended release 24 hr   Take 1 tablet every day by oral route for 90 days.       Janumet XR  MG tablet  Generic drug: SITagliptin-metFORMIN HCl ER      1 tablet 2 (two) times a day.       lisinopril 20 MG tablet  Commonly known as: PRINIVIL,ZESTRIL      Take 1 tablet by mouth Daily.       nitroglycerin 0.4 MG SL tablet  Commonly known as: NITROSTAT      Place 1 tablet under the tongue Every 5 (Five) Minutes As Needed for Chest Pain. Take no more than 3 doses in 15 minutes.       omeprazole 40 MG capsule  Commonly known as: priLOSEC                  STOP taking these medications      clopidogrel 75 MG tablet  Commonly known as: PLAVIX                  Where to Get Your Medications        These medications were sent to [facility]/pharmacy #[id number] - [facility] [address] [facility] [phone]  - [phone] [address] [address]      Phone: [phone]   carvedilol 12.5 MG tablet  ticagrelor 90 MG tablet tablet         Discharge Diet: Cardiac diet    Activity at Discharge: As tolerated    Discharge disposition: home    Condition on Discharge: stable    Follow-up Appointments  Future Appointments   Date Time Provider Department Center   11/17/2023  1:30 PM Carlota Conti APRN [facility]     Additional Instructions for the Follow-ups that You Need to Schedule       Ambulatory Referral to Cardiac Rehab   As directed               Test Results Pending at Discharge  None     Qasim Mccormick MD  11/11/23  19:00 EST

## 2023-11-11 NOTE — OUTREACH NOTE
Prep Survey      Flowsheet Row Responses   [facility] facility patient discharged from? [place]   Is LACE score < 7 ? Yes   Eligibility Readm Mgmt   Discharge diagnosis Primary Diagnosis NSTEMI (non-ST elevated myocardial infarction)   Does the patient have one of the following disease processes/diagnoses(primary or secondary)? Acute MI (STEMI,NSTEMI)   Does the patient have Home health ordered? No   Is there a DME ordered? No   Prep survey completed? Yes            AB CASTRO - Registered Nurse

## 2023-11-13 ENCOUNTER — READMISSION MANAGEMENT (OUTPATIENT)
Dept: CALL CENTER | Facility: HOSPITAL | Age: 78
End: 2023-11-13
Payer: MEDICARE

## 2023-11-13 LAB
ACT BLD: 293 SECONDS (ref 82–152)
QT INTERVAL: 445 MS
QTC INTERVAL: 410 MS

## 2023-11-15 ENCOUNTER — TELEPHONE (OUTPATIENT)
Dept: CARDIAC REHAB | Facility: HOSPITAL | Age: 78
End: 2023-11-15
Payer: MEDICARE

## 2023-11-15 ENCOUNTER — READMISSION MANAGEMENT (OUTPATIENT)
Dept: CALL CENTER | Facility: HOSPITAL | Age: 78
End: 2023-11-15
Payer: MEDICARE

## 2023-11-15 NOTE — OUTREACH NOTE
AMI Week 1 Survey      Flowsheet Row Responses   [facility] facility patient discharged from? [place]   Does the patient have one of the following disease processes/diagnoses(primary or secondary)? Acute MI (STEMI,NSTEMI)   Week 1 attempt successful? No   Unsuccessful attempts Attempt 2            Bianca SCHAEFFER - Registered Nurse

## 2023-11-17 ENCOUNTER — READMISSION MANAGEMENT (OUTPATIENT)
Dept: CALL CENTER | Facility: HOSPITAL | Age: 78
End: 2023-11-17
Payer: MEDICARE

## 2023-11-17 NOTE — OUTREACH NOTE
AMI Week 1 Survey      Flowsheet Row Responses   [facility] facility patient discharged from? [place]   Does the patient have one of the following disease processes/diagnoses(primary or secondary)? Acute MI (STEMI,NSTEMI)   Week 1 attempt successful? No   Unsuccessful attempts Attempt 3            Marissa GIBBS - Registered Nurse

## 2024-08-07 ENCOUNTER — OFFICE VISIT (OUTPATIENT)
Dept: SLEEP MEDICINE | Facility: HOSPITAL | Age: 79
End: 2024-08-07
Payer: MEDICARE

## 2024-08-07 VITALS
DIASTOLIC BLOOD PRESSURE: 67 MMHG | OXYGEN SATURATION: 95 % | HEIGHT: 71 IN | HEART RATE: 53 BPM | WEIGHT: 203 LBS | BODY MASS INDEX: 28.42 KG/M2 | SYSTOLIC BLOOD PRESSURE: 124 MMHG

## 2024-08-07 DIAGNOSIS — E66.3 OVERWEIGHT (BMI 25.0-29.9): ICD-10-CM

## 2024-08-07 DIAGNOSIS — G47.31 PRIMARY CENTRAL SLEEP APNEA: Primary | ICD-10-CM

## 2024-08-07 PROCEDURE — G0463 HOSPITAL OUTPT CLINIC VISIT: HCPCS

## 2024-08-07 NOTE — PROGRESS NOTES
[facility]  Telephone: [phone] / Fax: [phone] [place]  Telephone: [phone] / Fax: [phone] Jannet Jones    Referring Physician: Charlie Arreguin MD  PCP: Charlie Arreguin MD    Reason for consult:  sleep apnea    Kwabena Le Jr. is a 79 y.o.male  was seen in the Sleep Disorders Center today for evaluation of sleep apnea.     He has been dealing with TANG, chest pain, seen in the ER, MI was reportedly ruled out per pt. He was referred to us for evaluation of witnessed apneas, he has been waking up with a panic feeling. Bedtime schedule is MN-7am. He takes a daily nap. History reviewed. He has CAD, hx of stents, HTN-controlled on medications. ESS is 12. He had a sleep study  through Dr Arreguin on 5/29/24([facility]). It showed AHI 45/hr, central /mixed apnea index 32/hr. He was referred to us for management of central sleep apnea. He denies prior history of CHF, stroke, brain injury, neuromuscular disease, opiate use, or central hypoventilation. Latest ECHO in Nov 2023 shows normal EF.    SH- retired, 2 cups caffeine, no alcohol    ROS-+frequent urination, +neck pain, +SOA, rest is negative.      Kwabena Le Jr.  has a past medical history of Acute gastrointestinal hemorrhage (8/10/2018), Perez's esophagus with high grade dysplasia (9/18/2018), BPH (benign prostatic hyperplasia), CAD (coronary artery disease), CKD (chronic kidney disease) stage 3, GFR 30-59 ml/min (5/4/2021), Colon polyp, GERD (gastroesophageal reflux disease), History of transfusion, Hyperlipidemia, Hypertension, Osteoarthritis, PSVT (paroxysmal supraventricular tachycardia), and Type 2 diabetes mellitus.    Current Medications:    Current Outpatient Medications:     allopurinol (ZYLOPRIM) 300 MG tablet, allopurinol 300 mg tablet  TAKE 1 TABLET DAILY, Disp: , Rfl:     ASPIRIN 81 PO, aspirin 81 mg tablet,delayed release  Take 1 tablet every day by oral route., Disp: , Rfl:      "atorvastatin (LIPITOR) 40 MG tablet, atorvastatin 40 mg tablet  TAKE 1 TABLET DAILY IN THE EVENING, Disp: , Rfl:     carvedilol (COREG) 12.5 MG tablet, Take 1 tablet by mouth 2 (Two) Times a Day With Meals., Disp: 60 tablet, Rfl: 11    dapagliflozin (Farxiga) 5 MG tablet tablet, Farxiga 5 mg tablet  Take 1 tablet every day by oral route for 30 days., Disp: , Rfl:     finasteride (PROSCAR) 5 MG tablet, finasteride 5 mg tablet, Disp: , Rfl:     glipizide (GLUCOTROL XL) 5 MG ER tablet, glipizide ER 5 mg tablet, extended release 24 hr  Take 1 tablet every day by oral route for 90 days., Disp: , Rfl:     lisinopril (PRINIVIL,ZESTRIL) 20 MG tablet, Take 1 tablet by mouth Daily., Disp: 90 tablet, Rfl: 3    nitroglycerin (NITROSTAT) 0.4 MG SL tablet, Place 1 tablet under the tongue Every 5 (Five) Minutes As Needed for Chest Pain. Take no more than 3 doses in 15 minutes., Disp: 25 tablet, Rfl: 1    omeprazole (priLOSEC) 40 MG capsule, , Disp: , Rfl:     SITagliptin-metFORMIN HCl ER (Janumet XR)  MG tablet, 1 tablet 2 (two) times a day., Disp: , Rfl:     ticagrelor (BRILINTA) 90 MG tablet tablet, Take 1 tablet by mouth 2 (Two) Times a Day., Disp: 60 each, Rfl: 11    I have reviewed Past Medical History, Past Surgical History, Medication List, Social History and Family History as entered in Sleep Questionnaire and EPIC.    ESS  12   Vital Signs /67   Pulse 53   Ht 180.3 cm (71\")   Wt 92.1 kg (203 lb)   SpO2 95%   BMI 28.31 kg/m²  Body mass index is 28.31 kg/m².    General Alert and oriented. No acute distress noted   Pharynx/Throat Class IV  Mallampati airway, large tongue, no evidence of redundant lateral pharyngeal tissue. No oral lesions. No thrush. Moist mucous membranes.   Head Normocephalic. Symmetrical. Atraumatic.    Nose No septal deviation. No drainage   Chest Wall Normal shape. Symmetric expansion with respiration. No tenderness.   Neck Trachea midline, no thyromegaly or adenopathy    Lungs Clear " to auscultation bilaterally. No wheezes. No rhonchi. No rales. Respirations regular, even and unlabored.   Heart Regular rhythm and normal rate. Normal S1 and S2. No murmur   Abdomen Soft, non-tender and non-distended. Normal bowel sounds. No masses.   Extremities Moves all extremities well. No edema   Psychiatric Normal mood and affect.       Diagnostic study  SNAP HST 5/2024- 5/29/24([facility]). It showed AHI 45/hr, central /mixed apnea index 32/hr, lowest desaturation 86%       Impression:  1. Primary central sleep apnea    2. Overweight (BMI 25.0-29.9)          Plan:  CPAP/BIPAP titration study was ordered. Assess for presence of central apnea on in lab study. Arrange appropriate treatment device after the study. I reviewed the sleep study results from May 2024 with patient. I discussed with pt the difference between central and obstructive sleep apnea. We discussed treatment modalities of both.  Weight loss will be strongly beneficial to reduce the severity of sleep-disordered breathing.     Pt does not feel that sleep aid is needed for the upcoming study. Caution during activities requiring prolonged concentration advised.    I appreciate the opportunity to participate in this patient's care.      DMITRY Wiggins  [facility]  Phone: [phone]      Part of this note may be an electronic transcription/translation of spoken language to printed text using the Dragon Dictation System. Some errors may exist even though the document was edited.

## 2024-10-16 ENCOUNTER — HOSPITAL ENCOUNTER (OUTPATIENT)
Dept: SLEEP MEDICINE | Facility: HOSPITAL | Age: 79
End: 2024-10-16
Payer: MEDICARE

## 2024-10-16 DIAGNOSIS — G47.31 PRIMARY CENTRAL SLEEP APNEA: ICD-10-CM

## 2024-10-16 PROCEDURE — 95811 POLYSOM 6/>YRS CPAP 4/> PARM: CPT

## 2024-10-28 DIAGNOSIS — G47.31 PRIMARY CENTRAL SLEEP APNEA: Primary | ICD-10-CM

## 2024-10-28 RX ORDER — ZOLPIDEM TARTRATE 5 MG/1
TABLET ORAL
Qty: 2 TABLET | Refills: 0 | Status: SHIPPED | OUTPATIENT
Start: 2024-10-28

## 2024-11-04 ENCOUNTER — DOCUMENTATION (OUTPATIENT)
Dept: SLEEP MEDICINE | Facility: HOSPITAL | Age: 79
End: 2024-11-04
Payer: MEDICARE

## 2024-11-04 NOTE — PROGRESS NOTES
Called Pt to give sleep study results and to schedule a inlab per .    Pt's phone is no longer in service and there are not any other contact numbers that are working.

## 2024-12-04 ENCOUNTER — HOSPITAL ENCOUNTER (OUTPATIENT)
Dept: SLEEP MEDICINE | Facility: HOSPITAL | Age: 79
Discharge: HOME | End: 2024-12-04
Admitting: INTERNAL MEDICINE
Payer: MEDICARE

## 2024-12-04 DIAGNOSIS — G47.31 PRIMARY CENTRAL SLEEP APNEA: ICD-10-CM

## 2024-12-04 PROCEDURE — 95811 POLYSOM 6/>YRS CPAP 4/> PARM: CPT

## 2024-12-10 DIAGNOSIS — G47.31 PRIMARY CENTRAL SLEEP APNEA: ICD-10-CM

## 2024-12-10 DIAGNOSIS — G47.33 OBSTRUCTIVE SLEEP APNEA, ADULT: Primary | ICD-10-CM

## 2024-12-11 ENCOUNTER — TELEPHONE (OUTPATIENT)
Dept: SLEEP MEDICINE | Facility: HOSPITAL | Age: 79
End: 2024-12-11
Payer: MEDICARE

## 2024-12-11 NOTE — TELEPHONE ENCOUNTER
----- Message from Krystina WEINSTEIN sent at 12/11/2024  3:46 PM EST -----  Sent over to [facility] in [place],   I will be keeping an eye out and will let you know ASAP if there is any issue    V/R  ----- Message -----  From: Gee Ontiveros MD  Sent: 12/10/2024   3:29 PM EST  To: DMITRY Wiggins; Krystina Barriga    I have read this patient's study and ordered a ASV device since BiPAP ST was insufficient.  I am not sure this will get approved by insurance.  Let me know what happens to the ASV device order.    Dr. Ontiveros

## 2025-02-27 ENCOUNTER — DOCUMENTATION (OUTPATIENT)
Dept: SLEEP MEDICINE | Facility: HOSPITAL | Age: 80
End: 2025-02-27
Payer: MEDICARE

## 2025-02-27 NOTE — PROGRESS NOTES
Pt called and stated that he has not heard from [employer] to set him up on Cpap.  Sleep center faxed another order over to [facility] to set up this patient with a new device order along with supplies.  Compliance and follow up has been scheduled 4/28/25

## 2025-04-28 ENCOUNTER — OFFICE VISIT (OUTPATIENT)
Dept: SLEEP MEDICINE | Facility: HOSPITAL | Age: 80
End: 2025-04-28
Payer: MEDICARE

## 2025-04-28 VITALS
HEIGHT: 71 IN | SYSTOLIC BLOOD PRESSURE: 115 MMHG | HEART RATE: 51 BPM | OXYGEN SATURATION: 94 % | DIASTOLIC BLOOD PRESSURE: 61 MMHG | BODY MASS INDEX: 28.7 KG/M2 | WEIGHT: 205 LBS

## 2025-04-28 DIAGNOSIS — F51.12 INSUFFICIENT SLEEP SYNDROME: ICD-10-CM

## 2025-04-28 DIAGNOSIS — G47.31 PRIMARY CENTRAL SLEEP APNEA: Primary | ICD-10-CM

## 2025-04-28 DIAGNOSIS — E66.3 OVERWEIGHT (BMI 25.0-29.9): ICD-10-CM

## 2025-04-28 PROCEDURE — G0463 HOSPITAL OUTPT CLINIC VISIT: HCPCS

## 2025-04-28 NOTE — PROGRESS NOTES
[facility]  [address]  [place] [address]  [phone]    PCP: Charlie Arreguin MD    Reason for visit:  Sleep disorders: MILLICENT Yuan is a 80 y.o.male who was seen in the Sleep Disorders Center today. He had a couple of studies and eventually got ASV machine few months ago. Has been using device. Sleeps from 12mn to 5am. He feels better when he awakens. He naps in afternoon for 1hr but does not use ASV.  [place] Sleepiness Scale is 9. Caffeine 1 per day. Alcohol <1 per week.    Kwabena  reports that he has never smoked. He has never used smokeless tobacco.    Pertinent Positive Review of Systems of denies  Rest of Review of Systems was negative as recorded in Sleep Questionnaire.    Patient  has a past medical history of Acute gastrointestinal hemorrhage (8/10/2018), Perez's esophagus with high grade dysplasia (9/18/2018), BPH (benign prostatic hyperplasia), CAD (coronary artery disease), CKD (chronic kidney disease) stage 3, GFR 30-59 ml/min (5/4/2021), Colon polyp, GERD (gastroesophageal reflux disease), History of transfusion, Hyperlipidemia, Hypertension, Osteoarthritis, PSVT (paroxysmal supraventricular tachycardia), and Type 2 diabetes mellitus.     Current Medications:    Current Outpatient Medications:     allopurinol (ZYLOPRIM) 300 MG tablet, allopurinol 300 mg tablet  TAKE 1 TABLET DAILY, Disp: , Rfl:     ASPIRIN 81 PO, aspirin 81 mg tablet,delayed release  Take 1 tablet every day by oral route., Disp: , Rfl:     atorvastatin (LIPITOR) 40 MG tablet, atorvastatin 40 mg tablet  TAKE 1 TABLET DAILY IN THE EVENING, Disp: , Rfl:     carvedilol (COREG) 12.5 MG tablet, Take 1 tablet by mouth 2 (Two) Times a Day With Meals., Disp: 60 tablet, Rfl: 11    dapagliflozin (Farxiga) 5 MG tablet tablet, Farxiga 5 mg tablet  Take 1 tablet every day by oral route for 30 days., Disp: , Rfl:     finasteride (PROSCAR) 5 MG tablet, finasteride 5 mg tablet, Disp: , Rfl:      "glipizide (GLUCOTROL XL) 5 MG ER tablet, glipizide ER 5 mg tablet, extended release 24 hr  Take 1 tablet every day by oral route for 90 days., Disp: , Rfl:     lisinopril (PRINIVIL,ZESTRIL) 20 MG tablet, Take 1 tablet by mouth Daily., Disp: 90 tablet, Rfl: 3    nitroglycerin (NITROSTAT) 0.4 MG SL tablet, Place 1 tablet under the tongue Every 5 (Five) Minutes As Needed for Chest Pain. Take no more than 3 doses in 15 minutes., Disp: 25 tablet, Rfl: 1    omeprazole (priLOSEC) 40 MG capsule, , Disp: , Rfl:     SITagliptin-metFORMIN HCl ER (Janumet XR)  MG tablet, 1 tablet 2 (two) times a day., Disp: , Rfl:     zolpidem (Ambien) 5 MG tablet, Bring to sleep lab DO NOT use at home. PLEASE take if not asleep within 30 mins of start of study., Disp: 2 tablet, Rfl: 0   also entered in Sleep Questionnaire         Vital Signs: /61   Pulse 51   Ht 180.3 cm (70.98\")   Wt 93 kg (205 lb)   SpO2 94%   BMI 28.60 kg/m²     Body mass index is 28.6 kg/m².       Tongue: Normal       Dentition: good       Pharynx: Posterior pharyngeal pillars are wide   Mallampatti: II (hard and soft palate, upper portion of tonsils anduvula visible)        General: Alert. Cooperative. Well developed. No acute distress.             Nose: No septal deviation. No drainage.          Throat: No oral lesions. No thrush. Moist mucous membranes.           Lungs:  Clear to auscultation bilaterally.            Heart:  Regular rhythm and normal rate. No murmur.     Diagnostic data available to date is as below and was reviewed on current visit:  SNAP HST 5/2024- 5/29/24([facility]). It showed AHI 45/hr, central /mixed apnea index 32/hr, lowest desaturation 86%   10/16/24: Overnight titration study from 8:44 PM to 5:15 AM. Sleep efficiency was poor at 40.9%. Sleep distribution shows slightly reduced slow-wave sleep at 13% and reduced REM sleep at 13%. AHI index is 13.8. The obstructive apnea index is 0 and the central apnea index is 12.9. REM " index is not increased. Supine index 88.7 but only 23 minutes of supine position sleep was seen. PLM index 20.4. Oxygen saturation remained above 88%. CPAP was attempted but patient could not tolerate. BiPAP was titrated 8/4 to 11/7. Maximum sleep at 9/5. AHI index at this pressure setting is 11.6. At higher pressure settings AHI index is much higher.   12/4/24: Overnight titration study from 9:31 PM to 3:37 AM.  Sleep efficiency 70%.  Sleep distribution shows reduced slow-wave sleep at 13% and reduced REM sleep at 4%.  AHI index 12.1.  Central apnea index 5.6.  Obstructive apnea index 1.2.  In supine position sleep only AHI index was 35.8.  Nonsupine index is 5.9.  Arousal index 24.3.  PLM index 50.2.  Oxygen saturation remained above 88%.  Treatment was initiated with BiPAP ST from 10/6 up to 15/11.  High CA index was noted even with pressures of 14/10 and 15/11 BIPAP-ST mode with backup rate of 8.  Very high CA index is noted to be higher in supine position which is also evident from the positional data on the report.       Lab Results   Component Value Date    FERRITIN 174.00 01/02/2021       Most current available usage data reviewed on 04/28/2025:  No scans are attached to the encounter or orders placed in the encounter.  98% compliance average 4 hours 18 minutes AHI 4.6 with average pressure 0.3.  Patient is on a ASV device    [employer]: [employer]    Prescription to [facility] for replacement supplies as below:    full face mask    Replace head-gear every 3 months.  Replace cushions every 2-4 weeks.     A 4604 Heated Tubing  every 3 mth    A 7037 Standard Tubing  every 3 mth   x A 7035 Headgear  every 3 mth   x A 7046 Repl Humidifier Chamber  every 6 yrs   x A 7038 Disposable Filters  2 per mth   x A 7039 Non-disposable Filter  every 6 mth   x A 7036 Chin Strap  every 6 mth     No orders of the defined types were placed in this encounter.         Impression:  1. Primary central sleep apnea    2. Overweight (BMI  25.0-29.9)    3. Insufficient sleep syndrome        Plan:  Kwabena will continue use of ASV for central sleep apnea.  AHI is controlled.  Etiology of CSA is unknown at this time.  Patient denies any prior history of stroke or significant heart problems.  He does report persistent daytime sleepiness and takes a nap every day.  However his sleep time at night is only 5 hours.  I encouraged him to try and sleep longer hours at night.  Hopefully with use of the ASV he can sleep through the night without interruption.  Otherwise he can continue with his daytime naps but it would be preferable for him to use his ASV during all sleep episodes.  He needs to increase sleep time  to 7-8 hours every day.    Effective treatment of sleep apnea reduces the associated cardiovascular and cerebrovascular risks associated. In patients with elevated BMI, weight loss can be additionally beneficial. With use of positive pressure device; change of supplies per the permitted insurance schedule is necessary.    This patient is compliant with PAP machine and benefits from its use.     Patient will follow up in this clinic in 6 months  APRN    Thank you for allowing me to participate in your patient's care.    Electronically signed by Gee Ontiveros MD, 04/28/25, 11:17 AM EDT.    Part of this note may be an electronic transcription/translation of spoken language to printed text using the Dragon Dictation System.

## 2025-05-06 ENCOUNTER — TRANSCRIBE ORDERS (OUTPATIENT)
Dept: ADMINISTRATIVE | Facility: HOSPITAL | Age: 80
End: 2025-05-06
Payer: MEDICARE

## 2025-05-06 ENCOUNTER — HOSPITAL ENCOUNTER (OUTPATIENT)
Dept: GENERAL RADIOLOGY | Facility: HOSPITAL | Age: 80
Discharge: HOME | End: 2025-05-06
Admitting: INTERNAL MEDICINE
Payer: MEDICARE

## 2025-05-06 DIAGNOSIS — M21.922 ACQUIRED DEFORMITY OF LEFT UPPER ARM: Primary | ICD-10-CM

## 2025-05-06 DIAGNOSIS — M21.922 ACQUIRED DEFORMITY OF LEFT UPPER ARM: ICD-10-CM

## 2025-05-06 PROCEDURE — 73030 X-RAY EXAM OF SHOULDER: CPT

## (undated) DEVICE — RUNTHROUGH NS EXTRA FLOPPY PTCA GUIDEWIRE: Brand: RUNTHROUGH

## (undated) DEVICE — KT MANIFLD CARDIAC

## (undated) DEVICE — CANN NASL CO2 TRULINK W/O2 A/

## (undated) DEVICE — Device: Brand: ASAHI SION BLUE

## (undated) DEVICE — PK CATH CARD 40

## (undated) DEVICE — SUCTION CANISTER, 3000CC,SAFELINER: Brand: DEROYAL

## (undated) DEVICE — GLV SURG SENSICARE MICRO PF LF 6 STRL

## (undated) DEVICE — TREK CORONARY DILATATION CATHETER 2.50 MM X 20 MM / RAPID-EXCHANGE: Brand: TREK

## (undated) DEVICE — JACKT LAB KNIT COLR LG BLU

## (undated) DEVICE — ENDO. PORT CONNECTOR W/VALVE FOR OLYMPUS® SCOPES: Brand: ERBE

## (undated) DEVICE — CORONARY IMAGING CATHETER: Brand: OPTICROSS™ 6 HD

## (undated) DEVICE — SYR LL 3CC

## (undated) DEVICE — BW-412T DISP COMBO CLEANING BRUSH: Brand: SINGLE USE COMBINATION CLEANING BRUSH

## (undated) DEVICE — GW EMR FIX EXCHG J STD .035 3MM 260CM

## (undated) DEVICE — 6F .070 XB 3.5 100CM: Brand: VISTA BRITE TIP

## (undated) DEVICE — NC TREK CORONARY DILATATION CATHETER 3.0 MM X 15 MM / RAPID-EXCHANGE: Brand: NC TREK

## (undated) DEVICE — Device: Brand: DEFENDO AIR/WATER/SUCTION AND BIOPSY VALVE

## (undated) DEVICE — MICROSURGICAL DILATATION DEVICE: Brand: WOLVERINE CORONARY CUTTING BALLOON

## (undated) DEVICE — DEV INDEFLATOR P/N 580289

## (undated) DEVICE — CAP CLN BARRX RFA 8.8TO9.7MM SM

## (undated) DEVICE — GLIDESHEATH SLENDER STAINLESS STEEL KIT: Brand: GLIDESHEATH SLENDER

## (undated) DEVICE — NC TREK CORONARY DILATATION CATHETER 3.5 MM X 12 MM / RAPID-EXCHANGE: Brand: NC TREK

## (undated) DEVICE — GOWN ISOL W/THUMB UNIV BLU BX/15

## (undated) DEVICE — FRCP BX RADJAW4 NDL 2.8 240CM LG OG BX40

## (undated) DEVICE — HI-TORQUE WHISPER ES GUIDE WIRE .014 STRAIGHTTIP 3.0 CM X 190 CM: Brand: HI-TORQUE WHISPER

## (undated) DEVICE — HYBRID TUBING/CAP SET FOR OLYMPUS® SCOPES: Brand: ERBE

## (undated) DEVICE — CATH VENT MIV RADL PIG ST TIP 5F 110CM

## (undated) DEVICE — TREK CORONARY DILATATION CATHETER 3.0 MM X 12 MM / RAPID-EXCHANGE: Brand: TREK

## (undated) DEVICE — CATH DIAG DXTERITY ULTRA TRANSRADIAL 4.0 5F 100CM 0/SH

## (undated) DEVICE — VIAL FORMALIN CAP 10P 40ML

## (undated) DEVICE — MASK,FACE,FLUID RES,SHLD,FOGFREE,TIES: Brand: MEDLINE

## (undated) DEVICE — THE BITE BLOCK MAXI, LATEX FREE STRAP IS USED TO PROTECT THE ENDOSCOPE INSERTION TUBE FROM BEING BITTEN BY THE PATIENT.

## (undated) DEVICE — CATH DIAG IMPULSE FL3.5 5F 100CM

## (undated) DEVICE — TUBING, SUCTION, 1/4" X 10', STRAIGHT: Brand: MEDLINE

## (undated) DEVICE — CATH DIAG IMPULSE FR4 5F 100CM

## (undated) DEVICE — BITEBLOCK OMNI BLOC

## (undated) DEVICE — CATH ENDO BARRX CH RFA 7.5X15.7MM 135CM

## (undated) DEVICE — TR BAND RADIAL ARTERY COMPRESSION DEVICE: Brand: TR BAND

## (undated) DEVICE — MASK,FACE,FLUID RESIST,SHLD,EARLOOP: Brand: MEDLINE

## (undated) DEVICE — SPNG GZ WOVN 4X4IN 12PLY 10/BX STRL

## (undated) DEVICE — THE VASC BAND HEMOSTAT IS A COMPRESSION DEVICE TO ASSIST HEMOSTASIS OF ARTERIAL, VENOUS AND HEMODIALYSIS PERCUTANEOUS ACCESS SITES.: Brand: VASC BAND™ HEMOSTAT

## (undated) DEVICE — BALN PTCA TAKERU RX 2.5X12MM

## (undated) DEVICE — Device

## (undated) DEVICE — CATH GUIDE ZUMA2 EBU 6F 3.5X100CM